# Patient Record
Sex: MALE | Race: WHITE | NOT HISPANIC OR LATINO | Employment: OTHER | URBAN - METROPOLITAN AREA
[De-identification: names, ages, dates, MRNs, and addresses within clinical notes are randomized per-mention and may not be internally consistent; named-entity substitution may affect disease eponyms.]

---

## 2023-01-30 PROBLEM — M54.42 CHRONIC BILATERAL LOW BACK PAIN WITH LEFT-SIDED SCIATICA: Status: ACTIVE | Noted: 2023-01-30

## 2023-01-30 PROBLEM — Z96.89 SPINAL CORD STIMULATOR STATUS: Status: ACTIVE | Noted: 2023-01-30

## 2023-01-30 PROBLEM — I25.10 CAD (CORONARY ARTERY DISEASE): Status: ACTIVE | Noted: 2023-01-30

## 2023-01-30 PROBLEM — N28.1 RENAL CYST: Status: ACTIVE | Noted: 2023-01-30

## 2023-01-30 PROBLEM — G89.29 CHRONIC BILATERAL LOW BACK PAIN WITH LEFT-SIDED SCIATICA: Status: ACTIVE | Noted: 2023-01-30

## 2023-01-30 PROBLEM — K21.9 GASTROESOPHAGEAL REFLUX DISEASE: Status: ACTIVE | Noted: 2023-01-30

## 2023-01-30 PROBLEM — G47.00 INSOMNIA: Status: ACTIVE | Noted: 2023-01-30

## 2023-01-30 PROBLEM — G89.0 CENTRAL PAIN SYNDROME: Status: ACTIVE | Noted: 2023-01-30

## 2023-01-30 PROBLEM — T78.00XA ANAPHYLAXIS DUE TO FOOD: Status: ACTIVE | Noted: 2023-01-30

## 2023-01-30 PROBLEM — I25.2 H/O ACUTE MYOCARDIAL INFARCTION: Status: ACTIVE | Noted: 2023-01-30

## 2023-01-30 PROBLEM — E11.9 DIABETES MELLITUS (MULTI): Status: ACTIVE | Noted: 2023-01-30

## 2023-01-30 PROBLEM — Z95.5 H/O HEART ARTERY STENT: Status: ACTIVE | Noted: 2023-01-30

## 2023-01-30 PROBLEM — H93.13 TINNITUS OF BOTH EARS: Status: ACTIVE | Noted: 2023-01-30

## 2023-01-30 PROBLEM — H61.20 CERUMEN IMPACTION: Status: ACTIVE | Noted: 2023-01-30

## 2023-01-30 PROBLEM — N32.81 OAB (OVERACTIVE BLADDER): Status: ACTIVE | Noted: 2023-01-30

## 2023-01-30 PROBLEM — I20.1: Status: ACTIVE | Noted: 2023-01-30

## 2023-01-30 PROBLEM — E11.65 TYPE 2 DIABETES MELLITUS WITH HYPERGLYCEMIA, WITHOUT LONG-TERM CURRENT USE OF INSULIN (MULTI): Status: ACTIVE | Noted: 2023-01-30

## 2023-01-30 PROBLEM — R35.0 FREQUENCY OF URINATION: Status: ACTIVE | Noted: 2023-01-30

## 2023-01-30 PROBLEM — G25.81 RLS (RESTLESS LEGS SYNDROME): Status: ACTIVE | Noted: 2023-01-30

## 2023-01-30 PROBLEM — Z95.5 CORONARY STENT PATENT: Status: ACTIVE | Noted: 2023-01-30

## 2023-01-30 PROBLEM — H90.3 BILATERAL SENSORINEURAL HEARING LOSS: Status: ACTIVE | Noted: 2023-01-30

## 2023-01-30 RX ORDER — MIRABEGRON 25 MG/1
25 TABLET, FILM COATED, EXTENDED RELEASE ORAL DAILY
COMMUNITY
End: 2023-09-12 | Stop reason: ALTCHOICE

## 2023-01-30 RX ORDER — PREGABALIN 50 MG/1
1 CAPSULE ORAL 3 TIMES DAILY
COMMUNITY
Start: 2017-02-02 | End: 2023-12-22 | Stop reason: SDUPTHER

## 2023-01-30 RX ORDER — TAMSULOSIN HYDROCHLORIDE 0.4 MG/1
1 CAPSULE ORAL DAILY
COMMUNITY
Start: 2014-12-19 | End: 2023-12-26 | Stop reason: SDUPTHER

## 2023-01-30 RX ORDER — TIZANIDINE 4 MG/1
4 TABLET ORAL 3 TIMES DAILY PRN
COMMUNITY
End: 2024-01-04 | Stop reason: SDUPTHER

## 2023-01-30 RX ORDER — DILTIAZEM HYDROCHLORIDE 60 MG/1
TABLET, FILM COATED ORAL
COMMUNITY
Start: 2016-11-14 | End: 2023-10-26 | Stop reason: SDUPTHER

## 2023-01-30 RX ORDER — EPINEPHRINE 0.3 MG/.3ML
0.3 INJECTION INTRAMUSCULAR
COMMUNITY

## 2023-01-30 RX ORDER — METFORMIN HYDROCHLORIDE 500 MG/1
1 TABLET ORAL
COMMUNITY
Start: 2014-03-04 | End: 2023-09-12 | Stop reason: ALTCHOICE

## 2023-01-30 RX ORDER — ACETAMINOPHEN 500 MG
1 TABLET ORAL DAILY
COMMUNITY

## 2023-01-30 RX ORDER — PROMETHAZINE HYDROCHLORIDE 25 MG/1
25 TABLET ORAL EVERY 6 HOURS PRN
COMMUNITY
End: 2023-03-14 | Stop reason: SDUPTHER

## 2023-01-30 RX ORDER — CLOPIDOGREL BISULFATE 75 MG/1
1 TABLET ORAL DAILY
COMMUNITY
Start: 2014-03-03 | End: 2024-01-05 | Stop reason: SDUPTHER

## 2023-01-30 RX ORDER — NITROGLYCERIN 0.4 MG/1
TABLET SUBLINGUAL
COMMUNITY

## 2023-01-30 RX ORDER — LOSARTAN POTASSIUM 50 MG/1
1 TABLET ORAL DAILY
COMMUNITY
End: 2023-11-09

## 2023-01-30 RX ORDER — ATORVASTATIN CALCIUM 80 MG/1
80 TABLET, FILM COATED ORAL DAILY
COMMUNITY
End: 2023-11-09

## 2023-01-30 RX ORDER — PANTOPRAZOLE SODIUM 40 MG/1
1 TABLET, DELAYED RELEASE ORAL DAILY
COMMUNITY
Start: 2014-01-07 | End: 2023-12-22 | Stop reason: SDUPTHER

## 2023-01-30 RX ORDER — TRAZODONE HYDROCHLORIDE 100 MG/1
TABLET ORAL NIGHTLY
COMMUNITY
End: 2023-03-22 | Stop reason: SDUPTHER

## 2023-01-30 RX ORDER — ROPINIROLE 1 MG/1
1 TABLET, FILM COATED ORAL NIGHTLY
COMMUNITY
End: 2024-04-05 | Stop reason: SDUPTHER

## 2023-01-30 RX ORDER — DAPAGLIFLOZIN AND METFORMIN HYDROCHLORIDE 5; 1000 MG/1; MG/1
1 TABLET, FILM COATED, EXTENDED RELEASE ORAL DAILY
COMMUNITY
End: 2023-11-03 | Stop reason: SDUPTHER

## 2023-01-30 RX ORDER — ISOSORBIDE MONONITRATE 30 MG/1
1 TABLET, EXTENDED RELEASE ORAL DAILY
COMMUNITY
Start: 2013-12-27

## 2023-01-30 RX ORDER — ASPIRIN 325 MG
1 TABLET ORAL DAILY
COMMUNITY
Start: 2014-12-19

## 2023-01-30 RX ORDER — DULOXETIN HYDROCHLORIDE 60 MG/1
1 CAPSULE, DELAYED RELEASE ORAL DAILY
COMMUNITY
End: 2024-01-05 | Stop reason: SDUPTHER

## 2023-01-30 RX ORDER — DILTIAZEM HYDROCHLORIDE EXTENDED-RELEASE TABLETS 360 MG/1
1 TABLET, EXTENDED RELEASE ORAL DAILY
COMMUNITY
Start: 2014-12-19 | End: 2023-12-22 | Stop reason: SDUPTHER

## 2023-03-07 LAB
ALBUMIN (G/DL) IN SER/PLAS: 4.6 G/DL (ref 3.4–5)
ANION GAP IN SER/PLAS: 14 MMOL/L (ref 10–20)
CALCIUM (MG/DL) IN SER/PLAS: 9.7 MG/DL (ref 8.6–10.6)
CARBON DIOXIDE, TOTAL (MMOL/L) IN SER/PLAS: 29 MMOL/L (ref 21–32)
CHLORIDE (MMOL/L) IN SER/PLAS: 105 MMOL/L (ref 98–107)
CREATININE (MG/DL) IN SER/PLAS: 1.15 MG/DL (ref 0.5–1.3)
ERYTHROCYTE DISTRIBUTION WIDTH (RATIO) BY AUTOMATED COUNT: 12.2 % (ref 11.5–14.5)
ERYTHROCYTE MEAN CORPUSCULAR HEMOGLOBIN CONCENTRATION (G/DL) BY AUTOMATED: 33.1 G/DL (ref 32–36)
ERYTHROCYTE MEAN CORPUSCULAR VOLUME (FL) BY AUTOMATED COUNT: 91 FL (ref 80–100)
ERYTHROCYTES (10*6/UL) IN BLOOD BY AUTOMATED COUNT: 5.16 X10E12/L (ref 4.5–5.9)
GFR MALE: 75 ML/MIN/1.73M2
GLUCOSE (MG/DL) IN SER/PLAS: 114 MG/DL (ref 74–99)
HEMATOCRIT (%) IN BLOOD BY AUTOMATED COUNT: 47.1 % (ref 41–52)
HEMOGLOBIN (G/DL) IN BLOOD: 15.6 G/DL (ref 13.5–17.5)
INR IN PPP BY COAGULATION ASSAY: 1 (ref 0.9–1.1)
LEUKOCYTES (10*3/UL) IN BLOOD BY AUTOMATED COUNT: 8.9 X10E9/L (ref 4.4–11.3)
NRBC (PER 100 WBCS) BY AUTOMATED COUNT: 0 /100 WBC (ref 0–0)
PHOSPHATE (MG/DL) IN SER/PLAS: 3.4 MG/DL (ref 2.5–4.9)
PLATELETS (10*3/UL) IN BLOOD AUTOMATED COUNT: 223 X10E9/L (ref 150–450)
POTASSIUM (MMOL/L) IN SER/PLAS: 3.9 MMOL/L (ref 3.5–5.3)
PROTHROMBIN TIME (PT) IN PPP BY COAGULATION ASSAY: 11.1 SEC (ref 9.8–13.4)
SODIUM (MMOL/L) IN SER/PLAS: 144 MMOL/L (ref 136–145)
UREA NITROGEN (MG/DL) IN SER/PLAS: 15 MG/DL (ref 6–23)

## 2023-03-14 ENCOUNTER — OFFICE VISIT (OUTPATIENT)
Dept: PRIMARY CARE | Facility: CLINIC | Age: 58
End: 2023-03-14
Payer: COMMERCIAL

## 2023-03-14 VITALS
HEIGHT: 68 IN | WEIGHT: 267 LBS | BODY MASS INDEX: 40.47 KG/M2 | HEART RATE: 109 BPM | DIASTOLIC BLOOD PRESSURE: 86 MMHG | SYSTOLIC BLOOD PRESSURE: 140 MMHG

## 2023-03-14 DIAGNOSIS — Z00.00 MEDICARE ANNUAL WELLNESS VISIT, SUBSEQUENT: ICD-10-CM

## 2023-03-14 DIAGNOSIS — I25.118 CORONARY ARTERY DISEASE INVOLVING NATIVE HEART WITH OTHER FORM OF ANGINA PECTORIS, UNSPECIFIED VESSEL OR LESION TYPE (CMS-HCC): ICD-10-CM

## 2023-03-14 DIAGNOSIS — R11.0 NAUSEA: ICD-10-CM

## 2023-03-14 DIAGNOSIS — E11.65 TYPE 2 DIABETES MELLITUS WITH HYPERGLYCEMIA, WITHOUT LONG-TERM CURRENT USE OF INSULIN (MULTI): Primary | ICD-10-CM

## 2023-03-14 PROCEDURE — 3079F DIAST BP 80-89 MM HG: CPT | Performed by: INTERNAL MEDICINE

## 2023-03-14 PROCEDURE — 4010F ACE/ARB THERAPY RXD/TAKEN: CPT | Performed by: INTERNAL MEDICINE

## 2023-03-14 PROCEDURE — 3077F SYST BP >= 140 MM HG: CPT | Performed by: INTERNAL MEDICINE

## 2023-03-14 PROCEDURE — 1036F TOBACCO NON-USER: CPT | Performed by: INTERNAL MEDICINE

## 2023-03-14 PROCEDURE — G0439 PPPS, SUBSEQ VISIT: HCPCS | Performed by: INTERNAL MEDICINE

## 2023-03-14 PROCEDURE — 99213 OFFICE O/P EST LOW 20 MIN: CPT | Performed by: INTERNAL MEDICINE

## 2023-03-14 RX ORDER — PROMETHAZINE HYDROCHLORIDE 25 MG/1
25 TABLET ORAL EVERY 6 HOURS PRN
Qty: 30 TABLET | Refills: 1 | Status: SHIPPED | OUTPATIENT
Start: 2023-03-14 | End: 2023-10-16 | Stop reason: SDUPTHER

## 2023-03-14 ASSESSMENT — ACTIVITIES OF DAILY LIVING (ADL)
DOING_HOUSEWORK: INDEPENDENT
TAKING_MEDICATION: INDEPENDENT
GROCERY_SHOPPING: INDEPENDENT
MANAGING_FINANCES: INDEPENDENT

## 2023-03-14 ASSESSMENT — ENCOUNTER SYMPTOMS: LOSS OF SENSATION IN FEET: 1

## 2023-03-14 NOTE — PROGRESS NOTES
"Subjective   Reason for Visit: Logan Campos is an 57 y.o. male here for a Medicare Wellness visit.     Past Medical, Surgical, and Family History reviewed and updated in chart.    Reviewed all medications by prescribing practitioner or clinical pharmacist (such as prescriptions, OTCs, herbal therapies and supplements) and documented in the medical record.    HPI  He does 100 push ups a day  He does boxing  Has cut down on sugars  He was 300 pounds in 2020  Patient Self Assessment of Health Status  Patient Self Assessment: Fair    He is going to have a heart cath tomorrow at [facility].   He has been feeling tired and stressed.  He had an abnormal stess test and an abnormal cardiac ct angio  He has noted his stimulator for his vasospastic angina has been going off more often    He had a blood test last week  Nutrition and Exercise  Current Diet: Well Balanced Diet  Adequate Fluid Intake: Yes  Caffeine: Yes  Exercise Frequency: RegularlySugar was 114    Functional Ability/Level of Safety  Cognitive Impairment Observed: No cognitive impairment observed  Cognitive Impairment Reported: No cognitive impairment reported by patient or family    Home Safety Risk Factors: None    Patient Care Team:  Magda Gilman DO as PCP - General     Review of Systems    Objective   Vitals:  /86   Pulse 109   Ht 1.727 m (5' 8\")   Wt 121 kg (267 lb)   BMI 40.60 kg/m²       Physical Exam  Cardiovascular:      Rate and Rhythm: Normal rate and regular rhythm.      Heart sounds: Normal heart sounds.   Pulmonary:      Effort: Pulmonary effort is normal.      Breath sounds: Normal breath sounds.   Abdominal:      Palpations: Abdomen is soft.   Neurological:      General: No focal deficit present.      Mental Status: He is alert.         Assessment/Plan   Problem List Items Addressed This Visit          Circulatory    CAD (coronary artery disease)    Relevant Orders    Follow Up In [facility] - PCP in 6 mo       " Endocrine/Metabolic    Type 2 diabetes mellitus with hyperglycemia, without long-term current use of insulin (CMS/[facility]) - Primary    Relevant Orders    Hemoglobin A1C    Albumin , Urine Random    Creatinine, Urine Random    Comprehensive Metabolic Panel    Lipid Panel    Follow Up In [facility] - PCP  Was wondering abut ozempic for wt loss due to his pain. We did discuss it. He says he tends to have lower sugars. He is on xigduo. We can revisit this in the futuer     Other Visit Diagnoses       Nausea        Relevant Medications     promethazine (Phenergan) 25 mg tablet he gets occasional nausea due to his night meds. Will use phenergan few x per mo and he is out. Refill sent.

## 2023-03-22 DIAGNOSIS — G47.00 INSOMNIA, UNSPECIFIED TYPE: ICD-10-CM

## 2023-03-22 RX ORDER — TRAZODONE HYDROCHLORIDE 100 MG/1
100 TABLET ORAL NIGHTLY
Qty: 90 TABLET | Refills: 3 | Status: SHIPPED | OUTPATIENT
Start: 2023-03-22 | End: 2024-04-05 | Stop reason: SDUPTHER

## 2023-06-06 ENCOUNTER — TELEPHONE (OUTPATIENT)
Dept: PRIMARY CARE | Facility: CLINIC | Age: 58
End: 2023-06-06
Payer: COMMERCIAL

## 2023-06-06 NOTE — TELEPHONE ENCOUNTER
"I called the pt he has had at least 1 \"pure liquid Diarrhea stool daily for at least the last 17 days.  He has had some loose stools in between. He is also having right stabbing flank  pain that started a week ago that comes and goes.  No vomiting however he has had some nausea that also comes and goes.  He had 1 episode of blood in his stool but thinks that is related to his hemorrhoid . He has not been in antibiotics recently and has had no changes in his diet. He does not have a GI specialist.  Please advise.   "

## 2023-06-06 NOTE — TELEPHONE ENCOUNTER
Patient calling- has had diarrhea for more than 17 days. Some nausea,no vomiting, no fever. Has had some right side pain with it.  Has tried pepto, immodium,brat diet . Nothing is helping. Feels he needs an appt. How to handle.  [phone]

## 2023-08-26 PROBLEM — Z91.018 FOOD ALLERGY: Status: ACTIVE | Noted: 2023-08-26

## 2023-08-26 PROBLEM — R94.39 ABNORMAL STRESS TEST: Status: ACTIVE | Noted: 2023-08-26

## 2023-08-26 PROBLEM — I20.9 ANGINA PECTORIS (CMS-HCC): Status: ACTIVE | Noted: 2023-08-26

## 2023-08-26 RX ORDER — CALCIUM CARBONATE/VITAMIN D3 250-3.125
1 TABLET ORAL
COMMUNITY
End: 2023-12-22 | Stop reason: ALTCHOICE

## 2023-08-26 RX ORDER — LIDOCAINE HCL 100 %
POWDER (GRAM) MISCELLANEOUS
COMMUNITY
Start: 2023-04-06

## 2023-08-26 RX ORDER — LISINOPRIL 5 MG/1
1 TABLET ORAL DAILY
COMMUNITY
Start: 2022-11-27 | End: 2023-09-12 | Stop reason: ALTCHOICE

## 2023-08-26 RX ORDER — DILTIAZEM HYDROCHLORIDE 360 MG/1
1 CAPSULE, EXTENDED RELEASE ORAL DAILY
COMMUNITY
Start: 2023-03-15 | End: 2024-05-23 | Stop reason: SDUPTHER

## 2023-08-26 RX ORDER — MULTIVITAMIN/IRON/FOLIC ACID 18MG-0.4MG
1 TABLET ORAL DAILY
COMMUNITY
End: 2023-12-22 | Stop reason: SDUPTHER

## 2023-08-26 RX ORDER — MIRABEGRON 50 MG/1
1 TABLET, FILM COATED, EXTENDED RELEASE ORAL DAILY
COMMUNITY
Start: 2023-01-13 | End: 2023-12-26 | Stop reason: SDUPTHER

## 2023-08-26 RX ORDER — KETAMINE HCL 100 %
POWDER (GRAM) MISCELLANEOUS
COMMUNITY
Start: 2023-02-28 | End: 2024-04-03 | Stop reason: SDUPTHER

## 2023-09-12 ENCOUNTER — OFFICE VISIT (OUTPATIENT)
Dept: PRIMARY CARE | Facility: CLINIC | Age: 58
End: 2023-09-12
Payer: MEDICARE

## 2023-09-12 VITALS
RESPIRATION RATE: 16 BRPM | BODY MASS INDEX: 38.19 KG/M2 | WEIGHT: 252 LBS | HEIGHT: 68 IN | DIASTOLIC BLOOD PRESSURE: 74 MMHG | SYSTOLIC BLOOD PRESSURE: 106 MMHG | HEART RATE: 92 BPM

## 2023-09-12 DIAGNOSIS — E11.65 TYPE 2 DIABETES MELLITUS WITH HYPERGLYCEMIA, WITHOUT LONG-TERM CURRENT USE OF INSULIN (MULTI): ICD-10-CM

## 2023-09-12 DIAGNOSIS — F43.21 ADJUSTMENT DISORDER WITH DEPRESSED MOOD: Primary | ICD-10-CM

## 2023-09-12 DIAGNOSIS — Z23 FLU VACCINE NEED: ICD-10-CM

## 2023-09-12 DIAGNOSIS — I25.118 CORONARY ARTERY DISEASE INVOLVING NATIVE HEART WITH OTHER FORM OF ANGINA PECTORIS, UNSPECIFIED VESSEL OR LESION TYPE (CMS-HCC): ICD-10-CM

## 2023-09-12 PROCEDURE — 1036F TOBACCO NON-USER: CPT | Performed by: INTERNAL MEDICINE

## 2023-09-12 PROCEDURE — 90686 IIV4 VACC NO PRSV 0.5 ML IM: CPT | Performed by: INTERNAL MEDICINE

## 2023-09-12 PROCEDURE — G0008 ADMIN INFLUENZA VIRUS VAC: HCPCS | Performed by: INTERNAL MEDICINE

## 2023-09-12 PROCEDURE — 3074F SYST BP LT 130 MM HG: CPT | Performed by: INTERNAL MEDICINE

## 2023-09-12 PROCEDURE — 4010F ACE/ARB THERAPY RXD/TAKEN: CPT | Performed by: INTERNAL MEDICINE

## 2023-09-12 PROCEDURE — 3078F DIAST BP <80 MM HG: CPT | Performed by: INTERNAL MEDICINE

## 2023-09-12 PROCEDURE — 99214 OFFICE O/P EST MOD 30 MIN: CPT | Performed by: INTERNAL MEDICINE

## 2023-09-12 RX ORDER — SERTRALINE HYDROCHLORIDE 50 MG/1
100 TABLET, FILM COATED ORAL DAILY
COMMUNITY

## 2023-09-12 ASSESSMENT — PATIENT HEALTH QUESTIONNAIRE - PHQ9
SUM OF ALL RESPONSES TO PHQ9 QUESTIONS 1 AND 2: 6
2. FEELING DOWN, DEPRESSED OR HOPELESS: NEARLY EVERY DAY
1. LITTLE INTEREST OR PLEASURE IN DOING THINGS: NEARLY EVERY DAY

## 2023-09-12 ASSESSMENT — PAIN SCALES - GENERAL: PAINLEVEL: 4

## 2023-09-12 NOTE — PROGRESS NOTES
"Subjective   Logan Campos is a 57 y.o. male who presents for Follow-up.    He had a heart cath on 3/15 that showed non obstructive cad    He says his diarrhea that he called about in June, that had been going on for a few weeks had gone away after stopping a sparkling water he had been drinking  He did not want to go to ER , he says \"they always admit me due to my history\"    He has been having right flank pain intermittently  It is not too severe  He called about it in the summer   It has not gotten worse    Just started sertraline by psych, Dr Yu  He is going through a separation and he's not been doing as well   He is also on duloxetine 60    He rarely uses tizanidine; he uses it for back spasms    He is taking pregabalin    He says he got a pneumnia vaccine from Dr Faust's office  Review of Systems    Objective   /74 (BP Location: Left arm, Patient Position: Sitting, BP Cuff Size: Adult)   Pulse 92   Resp 16   Ht 1.727 m (5' 8\")   Wt 114 kg (252 lb)   BMI 38.32 kg/m²    Physical Exam  Visit Vitals  /74 (BP Location: Left arm, Patient Position: Sitting, BP Cuff Size: Adult)   Pulse 92   Resp 16   Ht 1.727 m (5' 8\")   Wt 114 kg (252 lb)   BMI 38.32 kg/m²   Smoking Status Never   BSA 2.34 m²      GEN: NAD  HEENT: normal  NECK: no adenopathy, no thyroid enlargment  LUNGS: CTAB  CV: reg S1/S2 no murmurs  EXT: no leg edema   Assessment/Plan   Problem List Items Addressed This Visit       CAD (coronary artery disease)    Relevant Orders    Comprehensive Metabolic Panel    Lipid Panel    Type 2 diabetes mellitus with hyperglycemia, without long-term current use of insulin (CMS/[facility])    Relevant Orders    Comprehensive Metabolic Panel    Hemoglobin A1C    Albumin , Urine Random    Creatinine, Urine Random    See me again in 3 months to follow up on diabetes.      Other Visit Diagnoses       Flu vaccine need        Relevant Orders    Flu vaccine (IIV4) age 6 months and greater, preservative free " (Completed)

## 2023-09-12 NOTE — PATIENT INSTRUCTIONS
Get blood test and urine test done, after fasting overnight.    You can do this at any time.    See me in 3 months to follow up on diabetes

## 2023-09-25 LAB — 11-NOR-9-CARBOXY-THC, URN, QUANT: 338 NG/ML

## 2023-09-26 PROBLEM — E78.2 MIXED HYPERLIPIDEMIA: Status: ACTIVE | Noted: 2023-09-26

## 2023-09-26 PROBLEM — F43.23 ADJUSTMENT DISORDER WITH MIXED ANXIETY AND DEPRESSED MOOD: Status: ACTIVE | Noted: 2023-09-26

## 2023-09-27 LAB
6-ACETYLMORPHINE: <25 NG/ML
7-AMINOCLONAZEPAM: <25 NG/ML
ALPHA-HYDROXYALPRAZOLAM: <25 NG/ML
ALPHA-HYDROXYMIDAZOLAM: <25 NG/ML
ALPRAZOLAM: <25 NG/ML
AMPHETAMINE (PRESENCE) IN URINE BY SCREEN METHOD: ABNORMAL
BARBITURATES PRESENCE IN URINE BY SCREEN METHOD: ABNORMAL
CANNABINOIDS IN URINE BY SCREEN METHOD: ABNORMAL
CHLORDIAZEPOXIDE: <25 NG/ML
CLONAZEPAM: <25 NG/ML
COCAINE (PRESENCE) IN URINE BY SCREEN METHOD: ABNORMAL
CODEINE: <50 NG/ML
CREATINE, URINE FOR DRUG: 79.9 MG/DL
DIAZEPAM: <25 NG/ML
DRUG SCREEN COMMENT URINE: ABNORMAL
EDDP: <25 NG/ML
FENTANYL CONFIRMATION, URINE: <2.5 NG/ML
HYDROCODONE: <25 NG/ML
HYDROMORPHONE: <25 NG/ML
LORAZEPAM: <25 NG/ML
METHADONE CONFIRMATION,URINE: <25 NG/ML
MIDAZOLAM: <25 NG/ML
MORPHINE URINE: <50 NG/ML
NORDIAZEPAM: <25 NG/ML
NORFENTANYL: <2.5 NG/ML
NORHYDROCODONE: <25 NG/ML
NOROXYCODONE: <25 NG/ML
O-DESMETHYLTRAMADOL: <50 NG/ML
OXAZEPAM: <25 NG/ML
OXYCODONE: <25 NG/ML
OXYMORPHONE: <25 NG/ML
PHENCYCLIDINE (PRESENCE) IN URINE BY SCREEN METHOD: ABNORMAL
TEMAZEPAM: <25 NG/ML
TRAMADOL: <50 NG/ML
ZOLPIDEM METABOLITE (ZCA): <25 NG/ML
ZOLPIDEM: <25 NG/ML

## 2023-10-02 ENCOUNTER — TELEPHONE (OUTPATIENT)
Dept: PRIMARY CARE | Facility: CLINIC | Age: 58
End: 2023-10-02
Payer: COMMERCIAL

## 2023-10-03 DIAGNOSIS — R26.2 DIFFICULTY WALKING: Primary | ICD-10-CM

## 2023-10-06 ENCOUNTER — TELEPHONE (OUTPATIENT)
Dept: PRIMARY CARE | Facility: CLINIC | Age: 58
End: 2023-10-06
Payer: COMMERCIAL

## 2023-10-06 NOTE — TELEPHONE ENCOUNTER
Call from voicemail    Patient you discuss with him about a medication to lower his A1C.  He states his co pay is affordable ($50 per month) and he would like the prescription for :    Ozempic    BN

## 2023-10-09 DIAGNOSIS — G89.29 OTHER CHRONIC PAIN: ICD-10-CM

## 2023-10-09 DIAGNOSIS — M54.42 LUMBAGO WITH SCIATICA, LEFT SIDE: ICD-10-CM

## 2023-10-12 ENCOUNTER — LAB (OUTPATIENT)
Dept: LAB | Facility: LAB | Age: 58
End: 2023-10-12
Payer: MEDICARE

## 2023-10-12 DIAGNOSIS — Z95.5 PRESENCE OF CORONARY ANGIOPLASTY IMPLANT AND GRAFT: Primary | ICD-10-CM

## 2023-10-12 DIAGNOSIS — I20.9 ANGINA PECTORIS, UNSPECIFIED (CMS-HCC): ICD-10-CM

## 2023-10-12 DIAGNOSIS — I20.1 ANGINA PECTORIS WITH DOCUMENTED SPASM (CMS-HCC): ICD-10-CM

## 2023-10-12 DIAGNOSIS — I25.10 ATHEROSCLEROTIC HEART DISEASE OF NATIVE CORONARY ARTERY WITHOUT ANGINA PECTORIS: ICD-10-CM

## 2023-10-12 DIAGNOSIS — E11.9 TYPE 2 DIABETES MELLITUS WITHOUT COMPLICATIONS (MULTI): ICD-10-CM

## 2023-10-12 DIAGNOSIS — I25.2 OLD MYOCARDIAL INFARCTION: ICD-10-CM

## 2023-10-12 DIAGNOSIS — E11.65 TYPE 2 DIABETES MELLITUS WITH HYPERGLYCEMIA (MULTI): ICD-10-CM

## 2023-10-12 DIAGNOSIS — E11.65 TYPE 2 DIABETES MELLITUS WITH HYPERGLYCEMIA, WITHOUT LONG-TERM CURRENT USE OF INSULIN (MULTI): ICD-10-CM

## 2023-10-12 LAB
ALBUMIN SERPL BCP-MCNC: 4.6 G/DL (ref 3.4–5)
ALP SERPL-CCNC: 108 U/L (ref 33–120)
ALT SERPL W P-5'-P-CCNC: 25 U/L (ref 10–52)
ANION GAP SERPL CALC-SCNC: 17 MMOL/L (ref 10–20)
AST SERPL W P-5'-P-CCNC: 20 U/L (ref 9–39)
BILIRUB SERPL-MCNC: 0.5 MG/DL (ref 0–1.2)
BUN SERPL-MCNC: 20 MG/DL (ref 6–23)
CALCIUM SERPL-MCNC: 10 MG/DL (ref 8.6–10.6)
CHLORIDE SERPL-SCNC: 105 MMOL/L (ref 98–107)
CHOLEST SERPL-MCNC: 133 MG/DL (ref 0–199)
CHOLESTEROL/HDL RATIO: 3.3
CO2 SERPL-SCNC: 27 MMOL/L (ref 21–32)
CREAT SERPL-MCNC: 1.4 MG/DL (ref 0.5–1.3)
CREAT UR-MCNC: 152.9 MG/DL (ref 20–370)
EST. AVERAGE GLUCOSE BLD GHB EST-MCNC: 163 MG/DL
GFR SERPL CREATININE-BSD FRML MDRD: 59 ML/MIN/1.73M*2
GLUCOSE SERPL-MCNC: 157 MG/DL (ref 74–99)
HBA1C MFR BLD: 7.3 %
HDLC SERPL-MCNC: 40.2 MG/DL
LDLC SERPL CALC-MCNC: 58 MG/DL
MAGNESIUM SERPL-MCNC: 2.52 MG/DL (ref 1.6–2.4)
MICROALBUMIN UR-MCNC: 8.2 MG/L
MICROALBUMIN/CREAT UR: 5.4 UG/MG CREAT
NON HDL CHOLESTEROL: 93 MG/DL (ref 0–149)
POTASSIUM SERPL-SCNC: 4.8 MMOL/L (ref 3.5–5.3)
PROT SERPL-MCNC: 7.5 G/DL (ref 6.4–8.2)
SODIUM SERPL-SCNC: 144 MMOL/L (ref 136–145)
TRIGL SERPL-MCNC: 176 MG/DL (ref 0–149)
VLDL: 35 MG/DL (ref 0–40)

## 2023-10-12 PROCEDURE — 83735 ASSAY OF MAGNESIUM: CPT

## 2023-10-12 PROCEDURE — 36415 COLL VENOUS BLD VENIPUNCTURE: CPT

## 2023-10-12 PROCEDURE — 83036 HEMOGLOBIN GLYCOSYLATED A1C: CPT

## 2023-10-12 PROCEDURE — 80053 COMPREHEN METABOLIC PANEL: CPT

## 2023-10-12 PROCEDURE — 80061 LIPID PANEL: CPT

## 2023-10-12 PROCEDURE — 82570 ASSAY OF URINE CREATININE: CPT

## 2023-10-12 PROCEDURE — 82043 UR ALBUMIN QUANTITATIVE: CPT

## 2023-10-16 ENCOUNTER — APPOINTMENT (OUTPATIENT)
Dept: CARDIOLOGY | Facility: CLINIC | Age: 58
End: 2023-10-16
Payer: COMMERCIAL

## 2023-10-16 DIAGNOSIS — R11.0 NAUSEA: ICD-10-CM

## 2023-10-16 DIAGNOSIS — E11.65 TYPE 2 DIABETES MELLITUS WITH HYPERGLYCEMIA, WITHOUT LONG-TERM CURRENT USE OF INSULIN (MULTI): Primary | ICD-10-CM

## 2023-10-16 RX ORDER — PROMETHAZINE HYDROCHLORIDE 25 MG/1
25 TABLET ORAL EVERY 6 HOURS PRN
Qty: 30 TABLET | Refills: 1 | Status: SHIPPED | OUTPATIENT
Start: 2023-10-16 | End: 2024-04-05 | Stop reason: SDUPTHER

## 2023-10-17 NOTE — TELEPHONE ENCOUNTER
Oswaldo for pt informing him the Phenergan 25 mg was called in to his pharmacy, and Ozempic will be discussed at his next visit.

## 2023-10-23 ENCOUNTER — TELEPHONE (OUTPATIENT)
Dept: PRIMARY CARE | Facility: CLINIC | Age: 58
End: 2023-10-23
Payer: COMMERCIAL

## 2023-10-23 DIAGNOSIS — R93.5 ABNORMAL COMPUTED TOMOGRAPHY OF ABDOMEN AND PELVIS: Primary | ICD-10-CM

## 2023-10-23 NOTE — TELEPHONE ENCOUNTER
Pt calling stating he was seen at [facility] on 10/13 for Diverticulitis. There was an incidental finding on his CT scan that they want his to discuss with his PCP. Can you please review.

## 2023-10-24 RX ORDER — METOPROLOL TARTRATE 25 MG/1
25 TABLET, FILM COATED ORAL ONCE
Status: CANCELLED | OUTPATIENT
Start: 2023-10-26 | End: 2023-10-26

## 2023-10-24 RX ORDER — NITROGLYCERIN 0.4 MG/1
0.4 TABLET SUBLINGUAL ONCE
Status: CANCELLED | OUTPATIENT
Start: 2023-10-26 | End: 2023-10-26

## 2023-10-24 RX ORDER — ONDANSETRON HYDROCHLORIDE 2 MG/ML
4 INJECTION, SOLUTION INTRAVENOUS ONCE
Status: CANCELLED | OUTPATIENT
Start: 2023-10-26 | End: 2023-10-26

## 2023-10-24 RX ORDER — FAMOTIDINE 10 MG/ML
20 INJECTION INTRAVENOUS ONCE AS NEEDED
Status: CANCELLED | OUTPATIENT
Start: 2023-10-26

## 2023-10-24 RX ORDER — EPINEPHRINE 0.3 MG/.3ML
0.3 INJECTION SUBCUTANEOUS EVERY 5 MIN PRN
Status: CANCELLED | OUTPATIENT
Start: 2023-10-26

## 2023-10-24 RX ORDER — ALBUTEROL SULFATE 0.83 MG/ML
3 SOLUTION RESPIRATORY (INHALATION) AS NEEDED
Status: CANCELLED | OUTPATIENT
Start: 2023-10-26

## 2023-10-24 RX ORDER — DIPHENHYDRAMINE HYDROCHLORIDE 50 MG/ML
50 INJECTION INTRAMUSCULAR; INTRAVENOUS AS NEEDED
Status: CANCELLED | OUTPATIENT
Start: 2023-10-26

## 2023-10-25 DIAGNOSIS — G89.0 CENTRAL PAIN SYNDROME: Primary | ICD-10-CM

## 2023-10-25 RX ORDER — PREGABALIN 50 MG/1
50 CAPSULE ORAL 3 TIMES DAILY
Qty: 270 CAPSULE | Refills: 0 | OUTPATIENT
Start: 2023-10-25

## 2023-10-26 ENCOUNTER — INFUSION (OUTPATIENT)
Dept: INFUSION THERAPY | Facility: CLINIC | Age: 58
End: 2023-10-26
Payer: COMMERCIAL

## 2023-10-26 VITALS
OXYGEN SATURATION: 94 % | DIASTOLIC BLOOD PRESSURE: 78 MMHG | SYSTOLIC BLOOD PRESSURE: 117 MMHG | HEART RATE: 82 BPM | TEMPERATURE: 97 F | RESPIRATION RATE: 12 BRPM

## 2023-10-26 DIAGNOSIS — M54.42 CHRONIC BILATERAL LOW BACK PAIN WITH LEFT-SIDED SCIATICA: ICD-10-CM

## 2023-10-26 DIAGNOSIS — G89.29 CHRONIC BILATERAL LOW BACK PAIN WITH LEFT-SIDED SCIATICA: ICD-10-CM

## 2023-10-26 DIAGNOSIS — Z96.89 SPINAL CORD STIMULATOR STATUS: ICD-10-CM

## 2023-10-26 DIAGNOSIS — G89.0 CENTRAL PAIN SYNDROME: ICD-10-CM

## 2023-10-26 PROCEDURE — 96368 THER/DIAG CONCURRENT INF: CPT | Mod: INF

## 2023-10-26 PROCEDURE — 2500000005 HC RX 250 GENERAL PHARMACY W/O HCPCS: Performed by: NURSE PRACTITIONER

## 2023-10-26 PROCEDURE — 2500000004 HC RX 250 GENERAL PHARMACY W/ HCPCS (ALT 636 FOR OP/ED): Performed by: NURSE PRACTITIONER

## 2023-10-26 PROCEDURE — 96365 THER/PROPH/DIAG IV INF INIT: CPT | Mod: INF

## 2023-10-26 RX ORDER — EPINEPHRINE 0.3 MG/.3ML
0.3 INJECTION SUBCUTANEOUS EVERY 5 MIN PRN
Status: CANCELLED | OUTPATIENT
Start: 2023-11-09

## 2023-10-26 RX ORDER — NITROGLYCERIN 0.4 MG/1
0.4 TABLET SUBLINGUAL ONCE
Status: CANCELLED | OUTPATIENT
Start: 2023-11-09 | End: 2023-11-09

## 2023-10-26 RX ORDER — METOPROLOL TARTRATE 25 MG/1
25 TABLET, FILM COATED ORAL ONCE
Status: CANCELLED | OUTPATIENT
Start: 2023-11-09 | End: 2023-11-09

## 2023-10-26 RX ORDER — DIPHENHYDRAMINE HYDROCHLORIDE 50 MG/ML
50 INJECTION INTRAMUSCULAR; INTRAVENOUS AS NEEDED
Status: CANCELLED | OUTPATIENT
Start: 2023-11-09

## 2023-10-26 RX ORDER — HYDROXYZINE PAMOATE 50 MG/1
50 CAPSULE ORAL ONCE
COMMUNITY

## 2023-10-26 RX ORDER — FAMOTIDINE 10 MG/ML
20 INJECTION INTRAVENOUS ONCE AS NEEDED
Status: CANCELLED | OUTPATIENT
Start: 2023-11-09

## 2023-10-26 RX ORDER — ONDANSETRON HYDROCHLORIDE 2 MG/ML
4 INJECTION, SOLUTION INTRAVENOUS ONCE
Status: CANCELLED | OUTPATIENT
Start: 2023-11-09 | End: 2023-11-09

## 2023-10-26 RX ORDER — ALBUTEROL SULFATE 0.83 MG/ML
3 SOLUTION RESPIRATORY (INHALATION) AS NEEDED
Status: CANCELLED | OUTPATIENT
Start: 2023-11-09

## 2023-10-26 RX ADMIN — Medication: at 09:04

## 2023-10-26 RX ADMIN — PROPOFOL 100 MG: 10 INJECTION, EMULSION INTRAVENOUS at 09:03

## 2023-10-26 ASSESSMENT — PAIN - FUNCTIONAL ASSESSMENT: PAIN_FUNCTIONAL_ASSESSMENT: 0-10

## 2023-10-26 ASSESSMENT — PAIN SCALES - GENERAL
PAINLEVEL_OUTOF10: 2
PAINLEVEL_OUTOF10: 8

## 2023-10-26 ASSESSMENT — PATIENT HEALTH QUESTIONNAIRE - PHQ9
2. FEELING DOWN, DEPRESSED OR HOPELESS: SEVERAL DAYS
1. LITTLE INTEREST OR PLEASURE IN DOING THINGS: SEVERAL DAYS
SUM OF ALL RESPONSES TO PHQ9 QUESTIONS 1 AND 2: 2

## 2023-10-26 ASSESSMENT — COLUMBIA-SUICIDE SEVERITY RATING SCALE - C-SSRS
6. HAVE YOU EVER DONE ANYTHING, STARTED TO DO ANYTHING, OR PREPARED TO DO ANYTHING TO END YOUR LIFE?: NO
2. HAVE YOU ACTUALLY HAD ANY THOUGHTS OF KILLING YOURSELF?: NO
1. IN THE PAST MONTH, HAVE YOU WISHED YOU WERE DEAD OR WISHED YOU COULD GO TO SLEEP AND NOT WAKE UP?: NO

## 2023-10-26 ASSESSMENT — ENCOUNTER SYMPTOMS
OCCASIONAL FEELINGS OF UNSTEADINESS: 0
LOSS OF SENSATION IN FEET: 0

## 2023-10-26 ASSESSMENT — PAIN DESCRIPTION - DESCRIPTORS: DESCRIPTORS: ACHING;NUMBNESS

## 2023-10-26 NOTE — PROGRESS NOTES
S: Patient here for 12th opioid sparing pain infusion. Patient reports 7% reduction in pain after last infusion that lasted 15. days    Purpose of pain infusion meds explained along with potential side effects.  Patient verbalized understanding.    B: Pain Issues    A: Patient currently has pain described on flow sheet documentation. Designated  is wife. Patient last ate solid food >5 hours ago, and had liquid >1 hours ago.    R: Plan; Obtain IV access, do patient risk assessment, and start opioid sparing infusion as ordered. Monitoring for S/S of adverse reactions.    0940: Pain infusion completed, patient tolerated well.

## 2023-10-31 ENCOUNTER — HOSPITAL ENCOUNTER (OUTPATIENT)
Dept: CARDIOLOGY | Facility: CLINIC | Age: 58
Discharge: HOME | End: 2023-10-31
Payer: MEDICARE

## 2023-10-31 ENCOUNTER — APPOINTMENT (OUTPATIENT)
Dept: INFUSION THERAPY | Facility: CLINIC | Age: 58
End: 2023-10-31
Payer: COMMERCIAL

## 2023-10-31 DIAGNOSIS — I25.10 CORONARY ARTERY DISEASE, UNSPECIFIED VESSEL OR LESION TYPE, UNSPECIFIED WHETHER ANGINA PRESENT, UNSPECIFIED WHETHER NATIVE OR TRANSPLANTED HEART: ICD-10-CM

## 2023-10-31 DIAGNOSIS — I25.10 CORONARY ARTERY DISEASE INVOLVING NATIVE CORONARY ARTERY OF NATIVE HEART WITHOUT ANGINA PECTORIS: Primary | ICD-10-CM

## 2023-10-31 LAB — EJECTION FRACTION APICAL 4 CHAMBER: 56.1

## 2023-10-31 PROCEDURE — 93306 TTE W/DOPPLER COMPLETE: CPT | Performed by: INTERNAL MEDICINE

## 2023-10-31 PROCEDURE — 2500000004 HC RX 250 GENERAL PHARMACY W/ HCPCS (ALT 636 FOR OP/ED): Performed by: STUDENT IN AN ORGANIZED HEALTH CARE EDUCATION/TRAINING PROGRAM

## 2023-10-31 PROCEDURE — 93306 TTE W/DOPPLER COMPLETE: CPT

## 2023-10-31 RX ORDER — PREGABALIN 50 MG/1
50 CAPSULE ORAL 3 TIMES DAILY
Status: CANCELLED | OUTPATIENT
Start: 2023-10-31

## 2023-10-31 RX ORDER — PREGABALIN 50 MG/1
50 CAPSULE ORAL 2 TIMES DAILY
Qty: 90 CAPSULE | Refills: 2 | Status: SHIPPED | OUTPATIENT
Start: 2023-10-31 | End: 2023-12-22 | Stop reason: SDUPTHER

## 2023-10-31 RX ADMIN — PERFLUTREN 10 ML OF DILUTION: 6.52 INJECTION, SUSPENSION INTRAVENOUS at 10:30

## 2023-11-01 ENCOUNTER — PATIENT MESSAGE (OUTPATIENT)
Dept: PAIN MEDICINE | Facility: CLINIC | Age: 58
End: 2023-11-01
Payer: COMMERCIAL

## 2023-11-01 DIAGNOSIS — G89.4 CHRONIC PAIN SYNDROME: Primary | ICD-10-CM

## 2023-11-02 RX ORDER — PREGABALIN 50 MG/1
50 CAPSULE ORAL 3 TIMES DAILY
Qty: 90 CAPSULE | Refills: 2 | Status: SHIPPED | OUTPATIENT
Start: 2023-11-02 | End: 2024-04-01 | Stop reason: SDUPTHER

## 2023-11-03 DIAGNOSIS — E11.65 TYPE 2 DIABETES MELLITUS WITH HYPERGLYCEMIA, WITHOUT LONG-TERM CURRENT USE OF INSULIN (MULTI): ICD-10-CM

## 2023-11-03 RX ORDER — DAPAGLIFLOZIN AND METFORMIN HYDROCHLORIDE 5; 1000 MG/1; MG/1
1 TABLET, FILM COATED, EXTENDED RELEASE ORAL
Qty: 90 TABLET | Refills: 3 | Status: SHIPPED | OUTPATIENT
Start: 2023-11-03 | End: 2023-11-09 | Stop reason: SDUPTHER

## 2023-11-07 DIAGNOSIS — E11.65 TYPE 2 DIABETES MELLITUS WITH HYPERGLYCEMIA, WITHOUT LONG-TERM CURRENT USE OF INSULIN (MULTI): ICD-10-CM

## 2023-11-07 DIAGNOSIS — I10 BENIGN ESSENTIAL HTN: Primary | ICD-10-CM

## 2023-11-07 DIAGNOSIS — E78.2 MIXED HYPERLIPIDEMIA: ICD-10-CM

## 2023-11-09 RX ORDER — LOSARTAN POTASSIUM 50 MG/1
50 TABLET ORAL DAILY
Qty: 90 TABLET | Refills: 3 | Status: SHIPPED | OUTPATIENT
Start: 2023-11-09

## 2023-11-09 RX ORDER — DAPAGLIFLOZIN AND METFORMIN HYDROCHLORIDE 5; 1000 MG/1; MG/1
1 TABLET, FILM COATED, EXTENDED RELEASE ORAL
Qty: 90 TABLET | Refills: 3 | Status: SHIPPED | OUTPATIENT
Start: 2023-11-09 | End: 2024-01-30 | Stop reason: SDUPTHER

## 2023-11-09 RX ORDER — ATORVASTATIN CALCIUM 80 MG/1
80 TABLET, FILM COATED ORAL DAILY
Qty: 90 TABLET | Refills: 3 | Status: SHIPPED | OUTPATIENT
Start: 2023-11-09

## 2023-11-16 PROBLEM — G90.50 COMPLEX REGIONAL PAIN SYNDROME TYPE I: Status: ACTIVE | Noted: 2023-11-16

## 2023-11-16 PROBLEM — J30.81 ALLERGIC RHINITIS DUE TO ANIMAL HAIR AND DANDER: Status: ACTIVE | Noted: 2019-01-24

## 2023-11-16 PROBLEM — Z95.5 PRESENCE OF STENT IN CORONARY ARTERY IN PATIENT WITH CORONARY ARTERY DISEASE: Status: ACTIVE | Noted: 2019-01-24

## 2023-11-16 PROBLEM — M17.12 PRIMARY OSTEOARTHRITIS OF LEFT KNEE: Status: ACTIVE | Noted: 2020-01-16

## 2023-11-16 PROBLEM — J45.30 MILD PERSISTENT ASTHMA WITHOUT COMPLICATION (HHS-HCC): Status: ACTIVE | Noted: 2020-04-16

## 2023-11-16 PROBLEM — M47.817 LUMBOSACRAL SPONDYLOSIS WITHOUT MYELOPATHY: Status: ACTIVE | Noted: 2023-11-16

## 2023-11-16 PROBLEM — S83.242A TEAR OF MEDIAL MENISCUS OF LEFT KNEE, CURRENT: Status: ACTIVE | Noted: 2018-08-21

## 2023-11-16 PROBLEM — I10 ESSENTIAL HYPERTENSION: Status: ACTIVE | Noted: 2022-07-20

## 2023-11-16 PROBLEM — N40.1 BENIGN PROSTATIC HYPERPLASIA WITH URINARY FREQUENCY: Status: ACTIVE | Noted: 2019-01-24

## 2023-11-16 PROBLEM — J45.909 ASTHMA (HHS-HCC): Status: ACTIVE | Noted: 2023-11-16

## 2023-11-16 PROBLEM — M48.061 SPINAL STENOSIS OF LUMBAR REGION WITHOUT NEUROGENIC CLAUDICATION: Status: ACTIVE | Noted: 2019-01-24

## 2023-11-16 PROBLEM — G61.9 INFLAMMATORY NEUROPATHY (MULTI): Status: ACTIVE | Noted: 2020-04-16

## 2023-11-16 PROBLEM — I25.10 PRESENCE OF STENT IN CORONARY ARTERY IN PATIENT WITH CORONARY ARTERY DISEASE: Status: ACTIVE | Noted: 2019-01-24

## 2023-11-16 PROBLEM — R11.0 CHRONIC NAUSEA: Status: ACTIVE | Noted: 2018-10-04

## 2023-11-16 PROBLEM — M54.17 LUMBOSACRAL RADICULOPATHY: Status: ACTIVE | Noted: 2023-11-16

## 2023-11-16 PROBLEM — R35.0 BENIGN PROSTATIC HYPERPLASIA WITH URINARY FREQUENCY: Status: ACTIVE | Noted: 2019-01-24

## 2023-11-17 ENCOUNTER — OFFICE VISIT (OUTPATIENT)
Dept: CARDIOLOGY | Facility: CLINIC | Age: 58
End: 2023-11-17
Payer: MEDICARE

## 2023-11-17 VITALS
BODY MASS INDEX: 37.03 KG/M2 | HEIGHT: 69 IN | HEART RATE: 89 BPM | DIASTOLIC BLOOD PRESSURE: 72 MMHG | SYSTOLIC BLOOD PRESSURE: 109 MMHG | WEIGHT: 250 LBS | OXYGEN SATURATION: 98 %

## 2023-11-17 DIAGNOSIS — I25.2 H/O ACUTE MYOCARDIAL INFARCTION: ICD-10-CM

## 2023-11-17 DIAGNOSIS — I10 ESSENTIAL HYPERTENSION: ICD-10-CM

## 2023-11-17 DIAGNOSIS — E78.2 MIXED HYPERLIPIDEMIA: ICD-10-CM

## 2023-11-17 DIAGNOSIS — Z95.5 CORONARY STENT PATENT: ICD-10-CM

## 2023-11-17 DIAGNOSIS — I25.118 CORONARY ARTERY DISEASE OF NATIVE ARTERY OF NATIVE HEART WITH STABLE ANGINA PECTORIS (CMS-HCC): Primary | ICD-10-CM

## 2023-11-17 PROCEDURE — 3074F SYST BP LT 130 MM HG: CPT | Performed by: NURSE PRACTITIONER

## 2023-11-17 PROCEDURE — 3051F HG A1C>EQUAL 7.0%<8.0%: CPT | Performed by: NURSE PRACTITIONER

## 2023-11-17 PROCEDURE — 3048F LDL-C <100 MG/DL: CPT | Performed by: NURSE PRACTITIONER

## 2023-11-17 PROCEDURE — 99212 OFFICE O/P EST SF 10 MIN: CPT | Mod: PO | Performed by: NURSE PRACTITIONER

## 2023-11-17 PROCEDURE — 3078F DIAST BP <80 MM HG: CPT | Performed by: NURSE PRACTITIONER

## 2023-11-17 PROCEDURE — 3061F NEG MICROALBUMINURIA REV: CPT | Performed by: NURSE PRACTITIONER

## 2023-11-17 PROCEDURE — 99212 OFFICE O/P EST SF 10 MIN: CPT | Performed by: NURSE PRACTITIONER

## 2023-11-17 PROCEDURE — 4010F ACE/ARB THERAPY RXD/TAKEN: CPT | Performed by: NURSE PRACTITIONER

## 2023-11-17 PROCEDURE — 1036F TOBACCO NON-USER: CPT | Performed by: NURSE PRACTITIONER

## 2023-11-17 ASSESSMENT — ENCOUNTER SYMPTOMS
ACTIVITY CHANGE: 1
GASTROINTESTINAL NEGATIVE: 1
FATIGUE: 0
ARTHRALGIAS: 1
BACK PAIN: 1
PSYCHIATRIC NEGATIVE: 1
APNEA: 0
CHEST TIGHTNESS: 1
DIAPHORESIS: 0
FEVER: 1
SHORTNESS OF BREATH: 1
CHILLS: 0
ENDOCRINE COMMENTS: + DIABETES
ENDOCRINE NEGATIVE: 1
EYES NEGATIVE: 1
ALLERGIC/IMMUNOLOGIC NEGATIVE: 1
APPETITE CHANGE: 0
PALPITATIONS: 0

## 2023-11-17 NOTE — PROGRESS NOTES
Logan Campos is a 57 y.o. male     History Of Present Illness     Mr Campos is a 57 year old male with a PMH of a MI x2, Coronary artery stents X8, T2DM, COPD and chronic back pain here for a routine follow up. He previously underwent a left heart catheterization which revealed normal coronaries and patent stents. He continues to under go pain management for his chronic back pain. He continues to complain of angina. He does complain of shortness of breath with exertion. He denies any complaints of palpitations, lower extremity edema, dizziness or syncope. He recently started exercising with both weight lifting, aquatics/cardio and tolerating this very well.  He recently underwent labs and an echocardiogram and I have reviewed the results with him    Social HX  Social History     Tobacco Use    Smoking status: Never     Passive exposure: Never    Smokeless tobacco: Never          Family HX  Family History   Problem Relation Name Age of Onset    Breast cancer Sister      Stomach cancer Maternal Grandmother            Review Of Systems   Review of Systems   Constitutional:  Positive for activity change and fever. Negative for appetite change, chills, diaphoresis and fatigue.   Eyes: Negative.    Respiratory:  Positive for chest tightness and shortness of breath. Negative for apnea.    Cardiovascular:  Positive for chest pain. Negative for palpitations and leg swelling.   Gastrointestinal: Negative.    Endocrine: Negative.         + Diabetes   Genitourinary: Negative.    Musculoskeletal:  Positive for arthralgias and back pain.   Skin: Negative.    Allergic/Immunologic: Negative.    Hematological:         Recent elevated WBC   Psychiatric/Behavioral: Negative.            Allergies  Allergies   Allergen Reactions    Onion Unknown    Cat Dander Unknown    Hydromorphone Rash and Unknown     Agent: Dilaudid          Vitals  109/72      Physical Exam  Physical Exam  Constitutional:       Appearance: Normal appearance.   HENT:       Head: Normocephalic and atraumatic.      Nose: Nose normal.      Mouth/Throat:      Mouth: Mucous membranes are dry.   Cardiovascular:      Rate and Rhythm: Normal rate and regular rhythm.      Pulses: Normal pulses.      Heart sounds: Normal heart sounds. No murmur heard.     No friction rub. No gallop.   Pulmonary:      Effort: Pulmonary effort is normal.      Breath sounds: Normal breath sounds.   Abdominal:      General: Bowel sounds are normal.      Palpations: Abdomen is soft.   Musculoskeletal:         General: Tenderness present. No swelling or deformity.      Cervical back: Normal range of motion and neck supple.      Right lower leg: No edema.      Left lower leg: No edema.   Skin:     General: Skin is warm and dry.      Coloration: Skin is not pale.      Findings: No erythema or rash.   Neurological:      General: No focal deficit present.      Mental Status: He is alert. Mental status is at baseline. He is disoriented.   Psychiatric:         Mood and Affect: Mood normal.         Behavior: Behavior normal.         Thought Content: Thought content normal.         Judgment: Judgment normal.            Current/Home Meds    Current Outpatient Medications:     aspirin 325 mg tablet, Take 1 tablet (325 mg) by mouth once daily., Disp: , Rfl:     atorvastatin (Lipitor) 80 mg tablet, TAKE 1 TABLET BY MOUTH EVERY DAY, Disp: 90 tablet, Rfl: 3    b complex 0.4 mg tablet, Take 1 tablet by mouth once daily., Disp: , Rfl:     calcium carbonate-vitamin D3 (Oyster Shell) 250 mg-3.125 mcg (125 unit) tablet, Take 1 tablet by mouth 2 times a day with meals., Disp: , Rfl:     cholecalciferol (Vitamin D-3) 5,000 Units tablet, Take by mouth., Disp: , Rfl:     chrm/vineg/bit-orang peel/gr t (APPLE CIDER VINEGAR PLUS ORAL), Take by mouth., Disp: , Rfl:     clopidogrel (Plavix) 75 mg tablet, Take 1 tablet (75 mg) by mouth once daily., Disp: , Rfl:     dapaglifloz propaned-metformin (Xigduo XR) 5-1,000 mg, Take 1 tablet by  mouth once daily with a meal., Disp: 90 tablet, Rfl: 3    dilTIAZem LA (Cardizem LA) 240 mg 24 hr tablet, Take 1.5 tablets by mouth 1 (one) time each day., Disp: , Rfl:     DULoxetine (Cymbalta) 60 mg DR capsule, Take 1 capsule (60 mg) by mouth once daily., Disp: , Rfl:     EPINEPHrine (EpiPen) 0.3 mg/0.3 mL injection syringe, 0.3 mL (0.3 mg). Inject into upper leg. Call 911 after use., Disp: , Rfl:     fish oil concentrate (Omega-3) 120-180 mg capsule, Take 6 capsules (6,000 mg) by mouth once daily., Disp: , Rfl:     hydrOXYzine pamoate (Vistaril) 50 mg capsule, Take 1 capsule (50 mg) by mouth 1 time., Disp: , Rfl:     isosorbide mononitrate ER (Imdur) 30 mg 24 hr tablet, Take 1 tablet (30 mg) by mouth once daily., Disp: , Rfl:     ketamine HCl (ketamine, bulk,) 100 % powder, 1 puff nasally 4 times daily as needed Dsp# 12 ml, Disp: , Rfl:     lidocaine HCl, bulk, 100 % powder powder, , Disp: , Rfl:     losartan (Cozaar) 50 mg tablet, TAKE 1 TABLET BY MOUTH EVERY DAY, Disp: 90 tablet, Rfl: 3    Myrbetriq 50 mg tablet extended release 24 hr 24 hr tablet, Take 1 tablet (50 mg) by mouth once daily., Disp: , Rfl:     nitroglycerin (Nitrostat) 0.4 mg SL tablet, Place under the tongue., Disp: , Rfl:     NON FORMULARY, Alive veggie based vitamins, Disp: , Rfl:     NON FORMULARY, Medical Marijuana, Disp: , Rfl:     pantoprazole (ProtoNix) 40 mg EC tablet, Take 1 tablet (40 mg) by mouth once daily., Disp: , Rfl:     pregabalin (Lyrica) 50 mg capsule, Take 1 capsule (50 mg) by mouth 3 times a day., Disp: , Rfl:     pregabalin (Lyrica) 50 mg capsule, Take 1 capsule (50 mg) by mouth 2 times a day., Disp: 90 capsule, Rfl: 2    pregabalin (Lyrica) 50 mg capsule, Take 1 capsule (50 mg) by mouth 3 times a day., Disp: 90 capsule, Rfl: 2    promethazine (Phenergan) 25 mg tablet, Take 1 tablet (25 mg) by mouth every 6 hours if needed for nausea., Disp: 30 tablet, Rfl: 1    rOPINIRole (Requip) 1 mg tablet, Take 1 tablet (1 mg) by mouth  once daily at bedtime., Disp: , Rfl:     sertraline (Zoloft) 50 mg tablet, Take 1 tablet (50 mg) by mouth once daily., Disp: , Rfl:     tamsulosin (Flomax) 0.4 mg 24 hr capsule, Take 1 capsule (0.4 mg) by mouth once daily., Disp: , Rfl:     Tiadylt  mg 24 hr capsule, Take 1 capsule (360 mg) by mouth once daily., Disp: , Rfl:     tiZANidine (Zanaflex) 4 mg tablet, Take 1 tablet (4 mg) by mouth 3 times a day as needed for muscle spasms., Disp: , Rfl:     traZODone (Desyrel) 100 mg tablet, Take 1 tablet (100 mg) by mouth once daily at bedtime. Take one half or one whole tablet about 20 min before bedtime, Disp: 90 tablet, Rfl: 3    VITAMIN B COMPLEX ORAL, Take 1 tablet by mouth 1 (one) time each day., Disp: , Rfl:        Labs     BUN  20  Cr 1.4  K 4.8  Magnesium 2.52    CHOLESTEROL 133  HDL 40.2  LDL 58  TG  176        Cardiac Service Results:    October 2023 echo:  Normal LV systolic function with an EF of 60%  Grade I diastolic dysfunction    MARCH 15, 23 CARDIAC CATH: NON OBSTRUCTIVE CAD, PATENT STENTS     JANUARY 18, 2023 NM STRESS TEST: ABNORMAL PERFUSION STRESS, MILD ANTEROSEPTAL MYOCARDIAL ISCHEMIA. ABNORMAL LV FUNCTION. EF 49% .COMPARED TO PRIOR STUDY, THERE IS A SMALL ANTEROSEPTAL DEFECT PREVIOUSLY DESCRIBED AS FIXED IS NOW APPEAR REVERSIBLE WHICH MAY SUGGEST ARTIFACT. LVEF WAS PREVIOUSLY 63%          Assessment/Plan    CAD:  Stable Angina.  Most recent labs show:  BUN  20  Cr 1.4  K 4.8  Magnesium 2.52    CHOLESTEROL 133  HDL 40.2  LDL 58  TG  176    His most recent echo shows a normal LV systolic function with an EF of 60%.  Grade I diastolic dysfunction    His BP is well controlled at 109/72    His weight is 113 kg, however he is doing weight lifting and cardio now.    Continue ASA. Statin, diltiazem, losartan and plavix and low saturated fat, low sodium diet.   Continue exercise regimen and follow up with me in 6 months or sooner for any questions or concerns.

## 2023-11-28 RX ORDER — PROMETHAZINE HYDROCHLORIDE 25 MG/ML
12.5 INJECTION, SOLUTION INTRAMUSCULAR; INTRAVENOUS ONCE
Status: CANCELLED | OUTPATIENT
Start: 2023-11-30 | End: 2023-11-30

## 2023-11-28 RX ORDER — DIPHENHYDRAMINE HYDROCHLORIDE 50 MG/ML
50 INJECTION INTRAMUSCULAR; INTRAVENOUS AS NEEDED
Status: CANCELLED | OUTPATIENT
Start: 2023-11-30

## 2023-11-28 RX ORDER — NITROGLYCERIN 0.4 MG/1
0.4 TABLET SUBLINGUAL ONCE
Status: CANCELLED | OUTPATIENT
Start: 2023-11-30 | End: 2023-11-30

## 2023-11-28 RX ORDER — EPINEPHRINE 0.3 MG/.3ML
0.3 INJECTION SUBCUTANEOUS EVERY 5 MIN PRN
Status: CANCELLED | OUTPATIENT
Start: 2023-11-30

## 2023-11-28 RX ORDER — FAMOTIDINE 10 MG/ML
20 INJECTION INTRAVENOUS ONCE AS NEEDED
Status: CANCELLED | OUTPATIENT
Start: 2023-11-30

## 2023-11-28 RX ORDER — ALBUTEROL SULFATE 0.83 MG/ML
3 SOLUTION RESPIRATORY (INHALATION) AS NEEDED
Status: CANCELLED | OUTPATIENT
Start: 2023-11-30

## 2023-11-28 RX ORDER — METOCLOPRAMIDE HYDROCHLORIDE 5 MG/ML
10 INJECTION INTRAMUSCULAR; INTRAVENOUS ONCE
Status: CANCELLED | OUTPATIENT
Start: 2023-11-30 | End: 2023-11-30

## 2023-11-28 RX ORDER — ONDANSETRON HYDROCHLORIDE 2 MG/ML
4 INJECTION, SOLUTION INTRAVENOUS ONCE
Status: CANCELLED | OUTPATIENT
Start: 2023-11-30 | End: 2023-11-30

## 2023-11-28 RX ORDER — DIPHENHYDRAMINE HYDROCHLORIDE 50 MG/ML
25 INJECTION INTRAMUSCULAR; INTRAVENOUS ONCE
Status: CANCELLED | OUTPATIENT
Start: 2023-11-30 | End: 2023-11-30

## 2023-11-28 RX ORDER — METOPROLOL TARTRATE 25 MG/1
25 TABLET, FILM COATED ORAL ONCE
Status: CANCELLED | OUTPATIENT
Start: 2023-11-30 | End: 2023-11-30

## 2023-11-30 ENCOUNTER — INFUSION (OUTPATIENT)
Dept: INFUSION THERAPY | Facility: CLINIC | Age: 58
End: 2023-11-30
Payer: COMMERCIAL

## 2023-11-30 VITALS
SYSTOLIC BLOOD PRESSURE: 94 MMHG | DIASTOLIC BLOOD PRESSURE: 71 MMHG | HEART RATE: 91 BPM | RESPIRATION RATE: 11 BRPM | TEMPERATURE: 97.7 F | OXYGEN SATURATION: 94 %

## 2023-11-30 DIAGNOSIS — G89.29 CHRONIC BILATERAL LOW BACK PAIN WITH LEFT-SIDED SCIATICA: ICD-10-CM

## 2023-11-30 DIAGNOSIS — G89.0 CENTRAL PAIN SYNDROME: Primary | ICD-10-CM

## 2023-11-30 DIAGNOSIS — Z96.89 SPINAL CORD STIMULATOR STATUS: ICD-10-CM

## 2023-11-30 DIAGNOSIS — M54.42 CHRONIC BILATERAL LOW BACK PAIN WITH LEFT-SIDED SCIATICA: ICD-10-CM

## 2023-11-30 PROCEDURE — 2500000005 HC RX 250 GENERAL PHARMACY W/O HCPCS: Performed by: NURSE PRACTITIONER

## 2023-11-30 PROCEDURE — 96368 THER/DIAG CONCURRENT INF: CPT | Mod: INF

## 2023-11-30 PROCEDURE — 96375 TX/PRO/DX INJ NEW DRUG ADDON: CPT | Mod: INF

## 2023-11-30 PROCEDURE — 2500000004 HC RX 250 GENERAL PHARMACY W/ HCPCS (ALT 636 FOR OP/ED)

## 2023-11-30 PROCEDURE — 2500000004 HC RX 250 GENERAL PHARMACY W/ HCPCS (ALT 636 FOR OP/ED): Performed by: NURSE PRACTITIONER

## 2023-11-30 PROCEDURE — 96365 THER/PROPH/DIAG IV INF INIT: CPT | Mod: INF

## 2023-11-30 RX ORDER — KETOROLAC TROMETHAMINE 30 MG/ML
30 INJECTION, SOLUTION INTRAMUSCULAR; INTRAVENOUS ONCE
Status: COMPLETED | OUTPATIENT
Start: 2023-11-30 | End: 2023-11-30

## 2023-11-30 RX ORDER — DIPHENHYDRAMINE HYDROCHLORIDE 50 MG/ML
25 INJECTION INTRAMUSCULAR; INTRAVENOUS ONCE
Status: CANCELLED | OUTPATIENT
Start: 2023-12-14 | End: 2023-12-14

## 2023-11-30 RX ORDER — FAMOTIDINE 10 MG/ML
20 INJECTION INTRAVENOUS ONCE AS NEEDED
Status: CANCELLED | OUTPATIENT
Start: 2023-12-14

## 2023-11-30 RX ORDER — KETOROLAC TROMETHAMINE 30 MG/ML
30 INJECTION, SOLUTION INTRAMUSCULAR; INTRAVENOUS ONCE
Status: CANCELLED | OUTPATIENT
Start: 2023-12-14 | End: 2023-12-14

## 2023-11-30 RX ORDER — METOPROLOL TARTRATE 25 MG/1
25 TABLET, FILM COATED ORAL ONCE
Status: CANCELLED | OUTPATIENT
Start: 2023-12-14 | End: 2023-12-14

## 2023-11-30 RX ORDER — DIPHENHYDRAMINE HYDROCHLORIDE 50 MG/ML
50 INJECTION INTRAMUSCULAR; INTRAVENOUS AS NEEDED
Status: CANCELLED | OUTPATIENT
Start: 2023-12-14

## 2023-11-30 RX ORDER — EPINEPHRINE 0.3 MG/.3ML
0.3 INJECTION SUBCUTANEOUS EVERY 5 MIN PRN
Status: CANCELLED | OUTPATIENT
Start: 2023-12-14

## 2023-11-30 RX ORDER — NITROGLYCERIN 0.4 MG/1
0.4 TABLET SUBLINGUAL ONCE
Status: CANCELLED | OUTPATIENT
Start: 2023-12-14 | End: 2023-12-14

## 2023-11-30 RX ORDER — METOCLOPRAMIDE HYDROCHLORIDE 5 MG/ML
10 INJECTION INTRAMUSCULAR; INTRAVENOUS ONCE
Status: CANCELLED | OUTPATIENT
Start: 2023-12-14 | End: 2023-12-14

## 2023-11-30 RX ORDER — ONDANSETRON HYDROCHLORIDE 2 MG/ML
4 INJECTION, SOLUTION INTRAVENOUS ONCE
Status: CANCELLED | OUTPATIENT
Start: 2023-12-14 | End: 2023-12-14

## 2023-11-30 RX ORDER — ALBUTEROL SULFATE 0.83 MG/ML
3 SOLUTION RESPIRATORY (INHALATION) AS NEEDED
Status: CANCELLED | OUTPATIENT
Start: 2023-12-14

## 2023-11-30 RX ORDER — KETOROLAC TROMETHAMINE 30 MG/ML
INJECTION, SOLUTION INTRAMUSCULAR; INTRAVENOUS
Status: COMPLETED
Start: 2023-11-30 | End: 2023-11-30

## 2023-11-30 RX ORDER — PROMETHAZINE HYDROCHLORIDE 25 MG/ML
12.5 INJECTION, SOLUTION INTRAMUSCULAR; INTRAVENOUS ONCE
Status: CANCELLED | OUTPATIENT
Start: 2023-12-14 | End: 2023-12-14

## 2023-11-30 RX ADMIN — KETOROLAC TROMETHAMINE 30 MG: 30 INJECTION, SOLUTION INTRAMUSCULAR; INTRAVENOUS at 09:15

## 2023-11-30 RX ADMIN — PROPOFOL 100 MG: 10 INJECTION, EMULSION INTRAVENOUS at 09:15

## 2023-11-30 RX ADMIN — KETOROLAC TROMETHAMINE 30 MG: 30 INJECTION, SOLUTION INTRAMUSCULAR at 09:15

## 2023-11-30 RX ADMIN — Medication: at 09:15

## 2023-11-30 ASSESSMENT — PAIN - FUNCTIONAL ASSESSMENT: PAIN_FUNCTIONAL_ASSESSMENT: 0-10

## 2023-11-30 ASSESSMENT — ENCOUNTER SYMPTOMS
OCCASIONAL FEELINGS OF UNSTEADINESS: 0
LOSS OF SENSATION IN FEET: 1
DEPRESSION: 1

## 2023-11-30 ASSESSMENT — COLUMBIA-SUICIDE SEVERITY RATING SCALE - C-SSRS
2. HAVE YOU ACTUALLY HAD ANY THOUGHTS OF KILLING YOURSELF?: NO
1. IN THE PAST MONTH, HAVE YOU WISHED YOU WERE DEAD OR WISHED YOU COULD GO TO SLEEP AND NOT WAKE UP?: NO
6. HAVE YOU EVER DONE ANYTHING, STARTED TO DO ANYTHING, OR PREPARED TO DO ANYTHING TO END YOUR LIFE?: NO

## 2023-11-30 ASSESSMENT — PATIENT HEALTH QUESTIONNAIRE - PHQ9
SUM OF ALL RESPONSES TO PHQ9 QUESTIONS 1 AND 2: 2
10. IF YOU CHECKED OFF ANY PROBLEMS, HOW DIFFICULT HAVE THESE PROBLEMS MADE IT FOR YOU TO DO YOUR WORK, TAKE CARE OF THINGS AT HOME, OR GET ALONG WITH OTHER PEOPLE: NOT DIFFICULT AT ALL
2. FEELING DOWN, DEPRESSED OR HOPELESS: SEVERAL DAYS
1. LITTLE INTEREST OR PLEASURE IN DOING THINGS: SEVERAL DAYS

## 2023-11-30 ASSESSMENT — PAIN SCALES - GENERAL
PAINLEVEL_OUTOF10: 2
PAINLEVEL: 6

## 2023-11-30 NOTE — PROGRESS NOTES
S: Patient here for 13 opioid sparing pain infusion. Patient reports 80% reduction in pain after last infusion that lasted 18 days    Purpose of pain infusion meds explained along with potential side effects.  Patient verbalized understanding.    B: Pain Issues 6/10 lower left back    A: Patient currently has pain described on flow sheet documentation. Designated  is wife. Patient last ate solid food 4 hours ago, and had liquid 2 hours ago.    R: Plan; Obtain IV access, do patient risk assessment, and start opioid sparing infusion as ordered. Monitoring for S/S of adverse reactions.

## 2023-11-30 NOTE — PATIENT INSTRUCTIONS
Post infusion teaching provided via handout and verbal instruction. Patient verbalized understanding. VSS, Patient states pain is 2/10. Will assist patient to waiting car via wheelchair.

## 2023-12-04 ENCOUNTER — APPOINTMENT (OUTPATIENT)
Dept: UROLOGY | Facility: CLINIC | Age: 58
End: 2023-12-04
Payer: COMMERCIAL

## 2023-12-04 ENCOUNTER — APPOINTMENT (OUTPATIENT)
Dept: GASTROENTEROLOGY | Facility: CLINIC | Age: 58
End: 2023-12-04
Payer: COMMERCIAL

## 2023-12-06 ENCOUNTER — ANCILLARY PROCEDURE (OUTPATIENT)
Dept: RADIOLOGY | Facility: CLINIC | Age: 58
End: 2023-12-06
Payer: COMMERCIAL

## 2023-12-06 DIAGNOSIS — G90.59 COMPLEX REGIONAL PAIN SYNDROME I OF OTHER SPECIFIED SITE: ICD-10-CM

## 2023-12-06 DIAGNOSIS — E11.9 TYPE 2 DIABETES MELLITUS WITHOUT COMPLICATIONS (MULTI): ICD-10-CM

## 2023-12-06 DIAGNOSIS — N28.1 CYST OF KIDNEY, ACQUIRED: ICD-10-CM

## 2023-12-06 DIAGNOSIS — G89.0 CENTRAL PAIN SYNDROME: ICD-10-CM

## 2023-12-06 PROCEDURE — 76770 US EXAM ABDO BACK WALL COMP: CPT | Performed by: STUDENT IN AN ORGANIZED HEALTH CARE EDUCATION/TRAINING PROGRAM

## 2023-12-06 PROCEDURE — 72072 X-RAY EXAM THORAC SPINE 3VWS: CPT | Performed by: STUDENT IN AN ORGANIZED HEALTH CARE EDUCATION/TRAINING PROGRAM

## 2023-12-06 PROCEDURE — 76770 US EXAM ABDO BACK WALL COMP: CPT

## 2023-12-06 PROCEDURE — 72072 X-RAY EXAM THORAC SPINE 3VWS: CPT

## 2023-12-07 ENCOUNTER — TELEPHONE (OUTPATIENT)
Dept: GASTROENTEROLOGY | Facility: CLINIC | Age: 58
End: 2023-12-07
Payer: COMMERCIAL

## 2023-12-07 NOTE — TELEPHONE ENCOUNTER
PT LVM in regards to a procedure tomorrow was calling for prep, upon further review or chart and appointments, pt has an office visit with Dr. Hare tomorrow. Attempted to call pt, LVM

## 2023-12-08 ENCOUNTER — TELEPHONE (OUTPATIENT)
Dept: GASTROENTEROLOGY | Facility: CLINIC | Age: 58
End: 2023-12-08

## 2023-12-08 ENCOUNTER — OFFICE VISIT (OUTPATIENT)
Dept: GASTROENTEROLOGY | Facility: CLINIC | Age: 58
End: 2023-12-08
Payer: COMMERCIAL

## 2023-12-08 VITALS
WEIGHT: 246 LBS | HEIGHT: 69 IN | BODY MASS INDEX: 36.43 KG/M2 | SYSTOLIC BLOOD PRESSURE: 118 MMHG | DIASTOLIC BLOOD PRESSURE: 81 MMHG | HEART RATE: 89 BPM

## 2023-12-08 DIAGNOSIS — K57.32 DIVERTICULITIS OF COLON: Primary | ICD-10-CM

## 2023-12-08 DIAGNOSIS — R93.5 ABNORMAL COMPUTED TOMOGRAPHY OF ABDOMEN AND PELVIS: ICD-10-CM

## 2023-12-08 DIAGNOSIS — R10.9 ABDOMINAL PAIN, UNSPECIFIED ABDOMINAL LOCATION: ICD-10-CM

## 2023-12-08 PROCEDURE — 3074F SYST BP LT 130 MM HG: CPT | Performed by: STUDENT IN AN ORGANIZED HEALTH CARE EDUCATION/TRAINING PROGRAM

## 2023-12-08 PROCEDURE — 3051F HG A1C>EQUAL 7.0%<8.0%: CPT | Performed by: STUDENT IN AN ORGANIZED HEALTH CARE EDUCATION/TRAINING PROGRAM

## 2023-12-08 PROCEDURE — 3061F NEG MICROALBUMINURIA REV: CPT | Performed by: STUDENT IN AN ORGANIZED HEALTH CARE EDUCATION/TRAINING PROGRAM

## 2023-12-08 PROCEDURE — 3079F DIAST BP 80-89 MM HG: CPT | Performed by: STUDENT IN AN ORGANIZED HEALTH CARE EDUCATION/TRAINING PROGRAM

## 2023-12-08 PROCEDURE — 99204 OFFICE O/P NEW MOD 45 MIN: CPT | Performed by: STUDENT IN AN ORGANIZED HEALTH CARE EDUCATION/TRAINING PROGRAM

## 2023-12-08 PROCEDURE — 3048F LDL-C <100 MG/DL: CPT | Performed by: STUDENT IN AN ORGANIZED HEALTH CARE EDUCATION/TRAINING PROGRAM

## 2023-12-08 PROCEDURE — 4010F ACE/ARB THERAPY RXD/TAKEN: CPT | Performed by: STUDENT IN AN ORGANIZED HEALTH CARE EDUCATION/TRAINING PROGRAM

## 2023-12-08 PROCEDURE — 1036F TOBACCO NON-USER: CPT | Performed by: STUDENT IN AN ORGANIZED HEALTH CARE EDUCATION/TRAINING PROGRAM

## 2023-12-08 RX ORDER — SODIUM CHLORIDE 0.9 % (FLUSH) 0.9 %
10 SYRINGE (ML) INJECTION AS NEEDED
OUTPATIENT
Start: 2023-12-08

## 2023-12-08 RX ORDER — SODIUM CHLORIDE 0.9 % (FLUSH) 0.9 %
10 SYRINGE (ML) INJECTION EVERY 12 HOURS
OUTPATIENT
Start: 2023-12-08

## 2023-12-08 RX ORDER — PANTOPRAZOLE SODIUM 40 MG/1
40 TABLET, DELAYED RELEASE ORAL DAILY
Qty: 30 TABLET | Refills: 11 | Status: SHIPPED | OUTPATIENT
Start: 2023-12-08 | End: 2024-12-07

## 2023-12-08 RX ORDER — SODIUM CHLORIDE, SODIUM LACTATE, POTASSIUM CHLORIDE, CALCIUM CHLORIDE 600; 310; 30; 20 MG/100ML; MG/100ML; MG/100ML; MG/100ML
20 INJECTION, SOLUTION INTRAVENOUS CONTINUOUS
OUTPATIENT
Start: 2024-01-10

## 2023-12-08 RX ORDER — SODIUM, POTASSIUM,MAG SULFATES 17.5-3.13G
1 SOLUTION, RECONSTITUTED, ORAL ORAL 2 TIMES DAILY
Qty: 354 ML | Refills: 0 | Status: ON HOLD | OUTPATIENT
Start: 2023-12-08 | End: 2024-01-10 | Stop reason: ALTCHOICE

## 2023-12-08 RX ORDER — POLYETHYLENE GLYCOL 3350 17 G/17G
17 POWDER, FOR SOLUTION ORAL 2 TIMES DAILY
Qty: 14 PACKET | Refills: 0 | Status: SHIPPED | OUTPATIENT
Start: 2023-12-08 | End: 2023-12-15

## 2023-12-08 NOTE — PROGRESS NOTES
58-year-old gentleman who is on disability for back pain, used to work as a banker, history of CAD/PCI x8 in 2019, Prinzmetal angina, diabetes, COPD, back pain, CKD presenting for follow-up after admission for diverticulitis in 10/14/2023.  Patient denies similar symptoms in the past.    EGD never  Colonoscopy 2019 pandiverticulosis, inadequate prep  Maternal grandmother had stomach cancer  1 bowel movement per day  Denies NSAIDs, social alcohol use, denies drug use smoking, positive marijuana             The note was created using voice recognition transcription software. Despite proofreading, unintentional typographical errors may be present. Please contact the GI office with any questions or concerns.     Current Medications: reviewed    A 10 point review of system is negative except for what is mentioned in the HPI    Follow up with GI was advised       Vital Signs: Reviewed    Physical Exam:  General: no apparent distress, pleasant and cooperative  Skin:  Warm and dry, no jaundice  HEENT: No scleral icterus, no conjunctival pallor, normocephalic, atraumatic, mucous membranes moist  Neck:  atraumatic, trachea midline, no JVD  Chest:  decreased air entry to auscultation bilaterally. No wheezes, rales, or rhonchi  CV:  Regular rate and rhythm.  Positive S1/S2  Abdomen: no distension, +BS, soft, non-tender to palpation, no rebound tenderness, no guarding, no rigidity, no discernible ascites   Extremities: no lower extremity edema, Chronic pigmentary changes, no cyanosis  Neurological:  A&Ox3 , no asterixis  Psychiatric: cooperative     Investigations:  Labs, radiological imaging and cardiac work up were reviewed    1-hepatitis C antibody - 7/2022    2-BMI 36, healthy lifestyle advised    3-Healthcare maintenance, recent diverticulitis in 10/14/2023, will schedule colonoscopy after 1 week of twice daily MiraLAX, cardiology clearance    4- recent diverticulitis in 10/14/2023, will schedule colonoscopy after 1  week of twice daily MiraLAX, cardiology clearance, Metamucil daily    5-long-term aspirin 325 and Plavix intake, start pantoprazole daily

## 2023-12-08 NOTE — PATIENT INSTRUCTIONS
1-we need to schedule for colonoscopy, for 1 week before your procedure please start taking MiraLAX twice a day to make sure that your prep is clean in addition to the gallon the day before your procedure  2-because you are taking 2 blood thinners, please start taking pantoprazole 40 mg once daily half an hour before breakfast for stomach protection  3-please continue taking the Metamucil daily, good job with following a healthy diet and trying to stay active

## 2023-12-11 ENCOUNTER — TELEPHONE (OUTPATIENT)
Dept: PRIMARY CARE | Facility: CLINIC | Age: 58
End: 2023-12-11

## 2023-12-11 ENCOUNTER — OFFICE VISIT (OUTPATIENT)
Dept: PRIMARY CARE | Facility: CLINIC | Age: 58
End: 2023-12-11
Payer: COMMERCIAL

## 2023-12-11 VITALS
HEART RATE: 91 BPM | SYSTOLIC BLOOD PRESSURE: 115 MMHG | DIASTOLIC BLOOD PRESSURE: 78 MMHG | BODY MASS INDEX: 36.58 KG/M2 | RESPIRATION RATE: 16 BRPM | WEIGHT: 247 LBS | HEIGHT: 69 IN

## 2023-12-11 DIAGNOSIS — I25.10 CORONARY ARTERY DISEASE INVOLVING NATIVE CORONARY ARTERY OF NATIVE HEART WITHOUT ANGINA PECTORIS: ICD-10-CM

## 2023-12-11 DIAGNOSIS — G61.9 INFLAMMATORY NEUROPATHY (MULTI): ICD-10-CM

## 2023-12-11 DIAGNOSIS — E78.2 MIXED HYPERLIPIDEMIA: ICD-10-CM

## 2023-12-11 DIAGNOSIS — E11.65 TYPE 2 DIABETES MELLITUS WITH HYPERGLYCEMIA, WITHOUT LONG-TERM CURRENT USE OF INSULIN (MULTI): Primary | ICD-10-CM

## 2023-12-11 DIAGNOSIS — E11.65 TYPE 2 DIABETES MELLITUS WITH HYPERGLYCEMIA, WITHOUT LONG-TERM CURRENT USE OF INSULIN (MULTI): ICD-10-CM

## 2023-12-11 PROCEDURE — 99214 OFFICE O/P EST MOD 30 MIN: CPT | Performed by: INTERNAL MEDICINE

## 2023-12-11 PROCEDURE — 4010F ACE/ARB THERAPY RXD/TAKEN: CPT | Performed by: INTERNAL MEDICINE

## 2023-12-11 PROCEDURE — 3051F HG A1C>EQUAL 7.0%<8.0%: CPT | Performed by: INTERNAL MEDICINE

## 2023-12-11 PROCEDURE — 3074F SYST BP LT 130 MM HG: CPT | Performed by: INTERNAL MEDICINE

## 2023-12-11 PROCEDURE — 1036F TOBACCO NON-USER: CPT | Performed by: INTERNAL MEDICINE

## 2023-12-11 PROCEDURE — 3078F DIAST BP <80 MM HG: CPT | Performed by: INTERNAL MEDICINE

## 2023-12-11 PROCEDURE — 3061F NEG MICROALBUMINURIA REV: CPT | Performed by: INTERNAL MEDICINE

## 2023-12-11 PROCEDURE — 3048F LDL-C <100 MG/DL: CPT | Performed by: INTERNAL MEDICINE

## 2023-12-11 RX ORDER — METOCLOPRAMIDE HYDROCHLORIDE 5 MG/ML
10 INJECTION INTRAMUSCULAR; INTRAVENOUS ONCE
Status: CANCELLED | OUTPATIENT
Start: 2023-12-14 | End: 2023-12-14

## 2023-12-11 RX ORDER — FAMOTIDINE 10 MG/ML
20 INJECTION INTRAVENOUS ONCE AS NEEDED
Status: CANCELLED | OUTPATIENT
Start: 2023-12-14

## 2023-12-11 RX ORDER — EPINEPHRINE 0.3 MG/.3ML
0.3 INJECTION SUBCUTANEOUS EVERY 5 MIN PRN
Status: CANCELLED | OUTPATIENT
Start: 2023-12-14

## 2023-12-11 RX ORDER — PROMETHAZINE HYDROCHLORIDE 25 MG/ML
12.5 INJECTION, SOLUTION INTRAMUSCULAR; INTRAVENOUS ONCE
Status: CANCELLED | OUTPATIENT
Start: 2023-12-14 | End: 2023-12-14

## 2023-12-11 RX ORDER — DIPHENHYDRAMINE HYDROCHLORIDE 50 MG/ML
50 INJECTION INTRAMUSCULAR; INTRAVENOUS AS NEEDED
Status: CANCELLED | OUTPATIENT
Start: 2023-12-14

## 2023-12-11 RX ORDER — METOPROLOL TARTRATE 25 MG/1
25 TABLET, FILM COATED ORAL ONCE
Status: CANCELLED | OUTPATIENT
Start: 2023-12-14 | End: 2023-12-14

## 2023-12-11 RX ORDER — DIPHENHYDRAMINE HYDROCHLORIDE 50 MG/ML
25 INJECTION INTRAMUSCULAR; INTRAVENOUS ONCE
Status: CANCELLED | OUTPATIENT
Start: 2023-12-14 | End: 2023-12-14

## 2023-12-11 RX ORDER — ONDANSETRON HYDROCHLORIDE 2 MG/ML
4 INJECTION, SOLUTION INTRAVENOUS ONCE
Status: CANCELLED | OUTPATIENT
Start: 2023-12-14 | End: 2023-12-14

## 2023-12-11 RX ORDER — NITROGLYCERIN 0.4 MG/1
0.4 TABLET SUBLINGUAL ONCE
Status: CANCELLED | OUTPATIENT
Start: 2023-12-14 | End: 2023-12-14

## 2023-12-11 RX ORDER — ALBUTEROL SULFATE 0.83 MG/ML
3 SOLUTION RESPIRATORY (INHALATION) AS NEEDED
Status: CANCELLED | OUTPATIENT
Start: 2023-12-14

## 2023-12-11 ASSESSMENT — PATIENT HEALTH QUESTIONNAIRE - PHQ9
SUM OF ALL RESPONSES TO PHQ9 QUESTIONS 1 AND 2: 2
1. LITTLE INTEREST OR PLEASURE IN DOING THINGS: SEVERAL DAYS
2. FEELING DOWN, DEPRESSED OR HOPELESS: SEVERAL DAYS

## 2023-12-11 ASSESSMENT — PAIN SCALES - GENERAL: PAINLEVEL: 0-NO PAIN

## 2023-12-11 NOTE — PROGRESS NOTES
"Subjective   Logan Campos is a 58 y.o. male who presents for Follow-up (Pt here for diabetes follow up ).    He would like to begin Ozempic   His Aic was 7.3, average sugar around 160  His fasting sugar at that time was  His last creatinine was 1.40  He ws over 300 lbs when pandemic started  He is slowly losing weight    Total cholesterol 133, LDL was 58.  AST ALT are normal.   Review of Systems    Objective   /78 (BP Location: Left arm, Patient Position: Sitting, BP Cuff Size: Adult)   Pulse 91   Resp 16   Ht 1.753 m (5' 9\")   Wt 112 kg (247 lb)   BMI 36.48 kg/m²    Physical Exam  Visit Vitals  /78 (BP Location: Left arm, Patient Position: Sitting, BP Cuff Size: Adult)   Pulse 91   Resp 16   Ht 1.753 m (5' 9\")   Wt 112 kg (247 lb)   BMI 36.48 kg/m²   Smoking Status Never   BSA 2.34 m²      GEN: NAD  HEENT: normal  NECK: no adenopathy, no thyroid enlargment  LUNGS: CTAB  CV: reg S1/S2 no murmurs  EXT: no leg edema   Assessment/Plan   Problem List Items Addressed This Visit       CAD (coronary artery disease) continue on atorvastatin 80 mg daily, clopidogrel 75 mg daily and aspirin 325.    Type 2 diabetes mellitus with hyperglycemia, without long-term current use of insulin (CMS/[facility]) - Primary  he is taking xigduo 5/1000 once daily.  His EGFR shows some decrease from 75 mL/min from March to 59 currently, which could affect metformin dosing .  He does not have proteinuria.  I believe he would strongly benefit from semaglutide.  I sent Rx to [facility] pharmacy, although he is on the  employee plan so that may need to change.  He does say that he has been getting his other prescriptions from [facility].  I consulted Earnest Ambrose PharmD from pharmacy and discussed this with the patient as well.    Relevant Medications    semaglutide 0.25 mg or 0.5 mg (2 mg/3 mL) pen injector    Other Relevant Orders    Comprehensive Metabolic Panel    Lipid Panel    TSH with reflex to Free T4 if abnormal    Follow Up In Advanced " Primary Care - Pharmacy    Mixed hyperlipidemia remain on atorvastatin.    Relevant Orders    Comprehensive Metabolic Panel    Lipid Panel    Inflammatory neuropathy (CMS/[facility])     Remains on duloxetine. I am not sure if he is still using Lyrica .  He is undergoing infusions from pain management. He does also use medical marijuana.

## 2023-12-11 NOTE — PATIENT INSTRUCTIONS
Inject once weekly  Will begin the 0.25 mg dose, once a week for the first month.   Can cause nausea, diarrhea or constipation.  Side effects can peak in 24 to 48 hours.     Our clinical pharmacist will contact you to see how are doing and can help you along with dose increases.    Remain on Xigduo while on this.     See me in 3 months.  Get blood test for A1c, thyroid and chemistry .

## 2023-12-12 ENCOUNTER — APPOINTMENT (OUTPATIENT)
Dept: PRIMARY CARE | Facility: CLINIC | Age: 58
End: 2023-12-12
Payer: COMMERCIAL

## 2023-12-12 NOTE — ASSESSMENT & PLAN NOTE
Remains on duloxetine. I am not sure if he is still using Lyrica .  He is undergoing infusions from pain management.

## 2023-12-13 ENCOUNTER — TELEPHONE (OUTPATIENT)
Dept: UROLOGY | Facility: CLINIC | Age: 58
End: 2023-12-13
Payer: COMMERCIAL

## 2023-12-13 DIAGNOSIS — K76.0 FATTY LIVER: ICD-10-CM

## 2023-12-13 NOTE — TELEPHONE ENCOUNTER
Tsering from [facility] states the insurance will not cover the ozempic without an alternatives first    BN

## 2023-12-13 NOTE — TELEPHONE ENCOUNTER
----- Message from Eric Preston MD sent at 12/8/2023  6:54 PM EST -----  Regarding: Abnormal ultrasound  Crystal, please contact patient and inform renal ultrasound does not show any stones or tumors.  However there is evidence of a fatty liver.  Please obtain LFTs prior to his visit on January 12, 2024.  ----- Message -----  From: Interface, Radiology Results In  Sent: 12/7/2023   5:43 PM EST  To: Eric Preston MD

## 2023-12-14 ENCOUNTER — APPOINTMENT (OUTPATIENT)
Dept: INFUSION THERAPY | Facility: CLINIC | Age: 58
End: 2023-12-14
Payer: MEDICARE

## 2023-12-14 ENCOUNTER — INFUSION (OUTPATIENT)
Dept: INFUSION THERAPY | Facility: CLINIC | Age: 58
End: 2023-12-14
Payer: COMMERCIAL

## 2023-12-14 VITALS
SYSTOLIC BLOOD PRESSURE: 120 MMHG | OXYGEN SATURATION: 96 % | DIASTOLIC BLOOD PRESSURE: 79 MMHG | RESPIRATION RATE: 17 BRPM | HEART RATE: 82 BPM | TEMPERATURE: 97.5 F

## 2023-12-14 DIAGNOSIS — G89.29 CHRONIC BILATERAL LOW BACK PAIN WITH LEFT-SIDED SCIATICA: ICD-10-CM

## 2023-12-14 DIAGNOSIS — M54.42 CHRONIC BILATERAL LOW BACK PAIN WITH LEFT-SIDED SCIATICA: ICD-10-CM

## 2023-12-14 DIAGNOSIS — Z96.89 SPINAL CORD STIMULATOR STATUS: ICD-10-CM

## 2023-12-14 DIAGNOSIS — G89.0 CENTRAL PAIN SYNDROME: Primary | ICD-10-CM

## 2023-12-14 PROCEDURE — 96368 THER/DIAG CONCURRENT INF: CPT | Mod: INF

## 2023-12-14 PROCEDURE — 2500000004 HC RX 250 GENERAL PHARMACY W/ HCPCS (ALT 636 FOR OP/ED)

## 2023-12-14 PROCEDURE — 96365 THER/PROPH/DIAG IV INF INIT: CPT | Mod: INF

## 2023-12-14 PROCEDURE — 96375 TX/PRO/DX INJ NEW DRUG ADDON: CPT | Mod: INF

## 2023-12-14 PROCEDURE — 2500000005 HC RX 250 GENERAL PHARMACY W/O HCPCS: Performed by: NURSE PRACTITIONER

## 2023-12-14 PROCEDURE — 2500000004 HC RX 250 GENERAL PHARMACY W/ HCPCS (ALT 636 FOR OP/ED): Performed by: NURSE PRACTITIONER

## 2023-12-14 RX ORDER — ALBUTEROL SULFATE 0.83 MG/ML
3 SOLUTION RESPIRATORY (INHALATION) AS NEEDED
Status: CANCELLED | OUTPATIENT
Start: 2023-12-28

## 2023-12-14 RX ORDER — METOCLOPRAMIDE HYDROCHLORIDE 5 MG/ML
10 INJECTION INTRAMUSCULAR; INTRAVENOUS ONCE
Status: CANCELLED | OUTPATIENT
Start: 2023-12-28 | End: 2023-12-28

## 2023-12-14 RX ORDER — ONDANSETRON HYDROCHLORIDE 2 MG/ML
4 INJECTION, SOLUTION INTRAVENOUS ONCE
Status: CANCELLED | OUTPATIENT
Start: 2023-12-28 | End: 2023-12-28

## 2023-12-14 RX ORDER — METOPROLOL TARTRATE 25 MG/1
25 TABLET, FILM COATED ORAL ONCE
Status: CANCELLED | OUTPATIENT
Start: 2023-12-28 | End: 2023-12-28

## 2023-12-14 RX ORDER — KETOROLAC TROMETHAMINE 30 MG/ML
30 INJECTION, SOLUTION INTRAMUSCULAR; INTRAVENOUS ONCE
Status: COMPLETED | OUTPATIENT
Start: 2023-12-14 | End: 2023-12-14

## 2023-12-14 RX ORDER — KETOROLAC TROMETHAMINE 30 MG/ML
30 INJECTION, SOLUTION INTRAMUSCULAR; INTRAVENOUS ONCE
Status: CANCELLED | OUTPATIENT
Start: 2023-12-28 | End: 2023-12-28

## 2023-12-14 RX ORDER — DIPHENHYDRAMINE HYDROCHLORIDE 50 MG/ML
50 INJECTION INTRAMUSCULAR; INTRAVENOUS AS NEEDED
Status: CANCELLED | OUTPATIENT
Start: 2023-12-28

## 2023-12-14 RX ORDER — NITROGLYCERIN 0.4 MG/1
0.4 TABLET SUBLINGUAL ONCE
Status: CANCELLED | OUTPATIENT
Start: 2023-12-28 | End: 2023-12-28

## 2023-12-14 RX ORDER — FAMOTIDINE 10 MG/ML
20 INJECTION INTRAVENOUS ONCE AS NEEDED
Status: CANCELLED | OUTPATIENT
Start: 2023-12-28

## 2023-12-14 RX ORDER — PROMETHAZINE HYDROCHLORIDE 25 MG/ML
12.5 INJECTION, SOLUTION INTRAMUSCULAR; INTRAVENOUS ONCE
Status: CANCELLED | OUTPATIENT
Start: 2023-12-28 | End: 2023-12-28

## 2023-12-14 RX ORDER — DIPHENHYDRAMINE HYDROCHLORIDE 50 MG/ML
25 INJECTION INTRAMUSCULAR; INTRAVENOUS ONCE
Status: CANCELLED | OUTPATIENT
Start: 2023-12-28 | End: 2023-12-28

## 2023-12-14 RX ORDER — KETOROLAC TROMETHAMINE 30 MG/ML
INJECTION, SOLUTION INTRAMUSCULAR; INTRAVENOUS
Status: COMPLETED
Start: 2023-12-14 | End: 2023-12-14

## 2023-12-14 RX ORDER — EPINEPHRINE 0.3 MG/.3ML
0.3 INJECTION SUBCUTANEOUS EVERY 5 MIN PRN
Status: CANCELLED | OUTPATIENT
Start: 2023-12-28

## 2023-12-14 RX ADMIN — KETOROLAC TROMETHAMINE 30 MG: 30 INJECTION, SOLUTION INTRAMUSCULAR at 09:05

## 2023-12-14 RX ADMIN — PROPOFOL 100 MG: 10 INJECTION, EMULSION INTRAVENOUS at 09:06

## 2023-12-14 RX ADMIN — Medication: at 09:06

## 2023-12-14 RX ADMIN — KETOROLAC TROMETHAMINE 30 MG: 30 INJECTION, SOLUTION INTRAMUSCULAR; INTRAVENOUS at 09:05

## 2023-12-14 ASSESSMENT — PATIENT HEALTH QUESTIONNAIRE - PHQ9
10. IF YOU CHECKED OFF ANY PROBLEMS, HOW DIFFICULT HAVE THESE PROBLEMS MADE IT FOR YOU TO DO YOUR WORK, TAKE CARE OF THINGS AT HOME, OR GET ALONG WITH OTHER PEOPLE: NOT DIFFICULT AT ALL
2. FEELING DOWN, DEPRESSED OR HOPELESS: SEVERAL DAYS
1. LITTLE INTEREST OR PLEASURE IN DOING THINGS: NOT AT ALL
SUM OF ALL RESPONSES TO PHQ9 QUESTIONS 1 AND 2: 1

## 2023-12-14 ASSESSMENT — ENCOUNTER SYMPTOMS
LOSS OF SENSATION IN FEET: 1
DEPRESSION: 0
OCCASIONAL FEELINGS OF UNSTEADINESS: 0

## 2023-12-14 ASSESSMENT — LIFESTYLE VARIABLES
HOW MANY STANDARD DRINKS CONTAINING ALCOHOL DO YOU HAVE ON A TYPICAL DAY: PATIENT DOES NOT DRINK
HOW OFTEN DO YOU HAVE SIX OR MORE DRINKS ON ONE OCCASION: NEVER

## 2023-12-14 ASSESSMENT — PAIN - FUNCTIONAL ASSESSMENT
PAIN_FUNCTIONAL_ASSESSMENT: 0-10
PAIN_FUNCTIONAL_ASSESSMENT: 0-10

## 2023-12-14 ASSESSMENT — PAIN DESCRIPTION - DESCRIPTORS
DESCRIPTORS: ACHING
DESCRIPTORS: ACHING

## 2023-12-14 ASSESSMENT — PAIN SCALES - GENERAL
PAINLEVEL_OUTOF10: 7
PAINLEVEL_OUTOF10: 2

## 2023-12-14 NOTE — PROGRESS NOTES
S: Patient here for #14 opioid sparing pain infusion. Patient reports 75% reduction in pain after last infusion that lasted 1week.    Purpose of pain infusion meds explained along with potential side effects.  Patient verbalized understanding.    B: Pain Issues    A: Patient currently has pain described on flow sheet documentation. Designated  is Paulo (wife). Patient last ate solid food >10 hours ago, and had liquid >10 hours ago.    R: Plan; Obtain IV access, do patient risk assessment, and start opioid sparing infusion as ordered. Monitoring for S/S of adverse reactions.

## 2023-12-14 NOTE — PROGRESS NOTES
Post infusion teaching provided via handout and verbal instruction. Patient verbalized understanding. VSS, Patient states pain is 2/10 tolerable

## 2023-12-15 RX ORDER — SEMAGLUTIDE 0.68 MG/ML
0.25 INJECTION, SOLUTION SUBCUTANEOUS
Refills: 0 | OUTPATIENT
Start: 2023-12-15

## 2023-12-22 ENCOUNTER — TELEMEDICINE (OUTPATIENT)
Dept: PHARMACY | Facility: HOSPITAL | Age: 58
End: 2023-12-22
Payer: COMMERCIAL

## 2023-12-22 ENCOUNTER — APPOINTMENT (OUTPATIENT)
Dept: PHARMACY | Facility: HOSPITAL | Age: 58
End: 2023-12-22
Payer: COMMERCIAL

## 2023-12-22 DIAGNOSIS — E11.65 TYPE 2 DIABETES MELLITUS WITH HYPERGLYCEMIA, WITHOUT LONG-TERM CURRENT USE OF INSULIN (MULTI): ICD-10-CM

## 2023-12-22 ASSESSMENT — ENCOUNTER SYMPTOMS: DIABETIC ASSOCIATED SYMPTOMS: 0

## 2023-12-22 NOTE — PROGRESS NOTES
[facility] ([facility])    Logan Campos is a 58 y.o. male was referred to Clinical Pharmacy Team to complete a comprehensive medication review (CMR) with a pharmacist as part of the [employer] diabetes program. Will continue to follow with patient for DM management.     Referring Provider: Magda Gilman, DO    Subjective   Allergies   Allergen Reactions    Cat Dander Shortness of breath    Onion Anaphylaxis    Hydromorphone Rash and Other     Agent: Dilaudid       [id number] - [place], [place] - [address] [facility]  [address] [address]  Phone: [phone] Fax: [phone]    [facility] - [place], [place] - [facility] [facility]  [address] [address]  Phone: [phone] Fax: [phone]      Diabetes  He presents for his initial diabetic visit. He has type 2 diabetes mellitus. There are no diabetic associated symptoms. There are no hypoglycemic complications. Diabetic complications include peripheral neuropathy. An ACE inhibitor/angiotensin II receptor blocker is being taken.     Current diet: reducing sugar and carbohydrate intake, does not eat for 12 hours a day (after dinner till breakfast the next day)     Current exercise: swimming 3x/week, occasional weight lifting or yoga at home    The patient does not have a known family history of diabetes.    Patient is using: glucometer  The patient is currently checking the blood glucose 1-2 times per day.    AMFBG average  mg/dL    Hypoglycemia frequency: infrequent  Hypoglycemia awareness: Yes     Diabetes Pharmacotherapy:  Xigduo XR 5-1000 mg 1 tablet daily    SECONDARY PREVENTION  - Statin? Yes  - ACE-I/ARB? Yes  - Aspirin? No    Pertinent PMH Review:  - PMH of Pancreatitis: No  - PMH of Retinopathy: No  - PMH of Urinary Tract Infections: No  - PMH of MTC: No  Objective     There were no vitals taken for this visit.     LAB  Lab Results   Component  Value Date    BILITOT 0.5 10/12/2023    CALCIUM 10.0 10/12/2023    CO2 27 10/12/2023     10/12/2023    CREATININE 1.40 (H) 10/12/2023    GLUCOSE 157 (H) 10/12/2023    ALKPHOS 108 10/12/2023    K 4.8 10/12/2023    PROT 7.5 10/12/2023     10/12/2023    AST 20 10/12/2023    ALT 25 10/12/2023    BUN 20 10/12/2023    ANIONGAP 17 10/12/2023    MG 2.52 (H) 10/12/2023    PHOS 3.4 03/07/2023    ALBUMIN 4.6 10/12/2023    GFRMALE 75 03/07/2023     Lab Results   Component Value Date    TRIG 176 (H) 10/12/2023    CHOL 133 10/12/2023    LDLCALC 58 10/12/2023    HDL 40.2 10/12/2023     Lab Results   Component Value Date    HGBA1C 7.3 (H) 10/12/2023       Current Outpatient Medications on File Prior to Visit   Medication Sig Dispense Refill    aspirin 325 mg tablet Take 1 tablet (325 mg) by mouth once daily.      atorvastatin (Lipitor) 80 mg tablet TAKE 1 TABLET BY MOUTH EVERY DAY 90 tablet 3    cholecalciferol (Vitamin D-3) 5,000 Units tablet Take 1 tablet (5,000 Units) by mouth once daily.      chrm/vineg/bit-orang peel/gr t (APPLE CIDER VINEGAR PLUS ORAL) Take by mouth once daily.      clopidogrel (Plavix) 75 mg tablet Take 1 tablet (75 mg) by mouth once daily.      dapaglifloz propaned-metformin (Xigduo XR) 5-1,000 mg Take 1 tablet by mouth once daily with a meal. 90 tablet 3    DULoxetine (Cymbalta) 60 mg DR capsule Take 1 capsule (60 mg) by mouth once daily.      fish oil concentrate (Omega-3) 120-180 mg capsule Take 6 capsules (6,000 mg) by mouth once daily.      hydrOXYzine pamoate (Vistaril) 50 mg capsule Take 1 capsule (50 mg) by mouth 1 time.      isosorbide mononitrate ER (Imdur) 30 mg 24 hr tablet Take 1 tablet (30 mg) by mouth once daily.      ketamine HCl (ketamine, bulk,) 100 % powder 1 puff nasally 4 times daily as needed Dsp# 12 ml      lidocaine HCl, bulk, 100 % powder powder In ketamine nasal spray      losartan (Cozaar) 50 mg tablet TAKE 1 TABLET BY MOUTH EVERY DAY 90 tablet 3    Myrbetriq 50 mg  tablet extended release 24 hr 24 hr tablet Take 1 tablet (50 mg) by mouth once daily.      NON FORMULARY Medical Marijuana      pantoprazole (ProtoNix) 40 mg EC tablet Take 1 tablet (40 mg) by mouth once daily. Do not crush, chew, or split. 30 tablet 11    pregabalin (Lyrica) 50 mg capsule Take 1 capsule (50 mg) by mouth 3 times a day. 90 capsule 2    promethazine (Phenergan) 25 mg tablet Take 1 tablet (25 mg) by mouth every 6 hours if needed for nausea. 30 tablet 1    rOPINIRole (Requip) 1 mg tablet Take 1 tablet (1 mg) by mouth once daily at bedtime.      sertraline (Zoloft) 50 mg tablet Take 1 tablet (50 mg) by mouth once daily.      tamsulosin (Flomax) 0.4 mg 24 hr capsule Take 1 capsule (0.4 mg) by mouth once daily.      Tiadylt  mg 24 hr capsule Take 1 capsule (360 mg) by mouth once daily.      tiZANidine (Zanaflex) 4 mg tablet Take 1 tablet (4 mg) by mouth 3 times a day as needed for muscle spasms.      VITAMIN B COMPLEX ORAL Take 1 tablet by mouth 1 (one) time each day.      [DISCONTINUED] b complex 0.4 mg tablet Take 1 tablet by mouth once daily.      [DISCONTINUED] dilTIAZem LA (Cardizem LA) 360 mg 24 hr tablet Take 1 tablet (360 mg) by mouth once daily.      [DISCONTINUED] pantoprazole (ProtoNix) 40 mg EC tablet Take 1 tablet (40 mg) by mouth once daily.      EPINEPHrine (EpiPen) 0.3 mg/0.3 mL injection syringe 0.3 mL (0.3 mg). Inject into upper leg. Call 911 after use.      nitroglycerin (Nitrostat) 0.4 mg SL tablet Place under the tongue.      NON FORMULARY Alive veggie based vitamins      [] polyethylene glycol (Glycolax, Miralax) 17 gram packet Take 17 g by mouth 2 times a day for 7 days. 14 packet 0    semaglutide 0.25 mg or 0.5 mg (2 mg/3 mL) pen injector Inject 0.25 mg under the skin 1 (one) time per week. (Patient not taking: Reported on 2023) 9 mL 0    sodium,potassium,mag sulfates (Suprep) 17.5-3.13-1.6 gram recon soln solution Take 1 bottle by mouth 2 times a day. Take one  bottle twice as directed by the prep instructions 354 mL 0    traZODone (Desyrel) 100 mg tablet Take 1 tablet (100 mg) by mouth once daily at bedtime. Take one half or one whole tablet about 20 min before bedtime 90 tablet 3    [DISCONTINUED] calcium carbonate-vitamin D3 (Oyster Shell) 250 mg-3.125 mcg (125 unit) tablet Take 1 tablet by mouth 2 times a day with meals.      [DISCONTINUED] pregabalin (Lyrica) 50 mg capsule Take 1 capsule (50 mg) by mouth 3 times a day.      [DISCONTINUED] pregabalin (Lyrica) 50 mg capsule Take 1 capsule (50 mg) by mouth 2 times a day. 90 capsule 2     No current facility-administered medications on file prior to visit.        HISTORICAL PHARMACOTHERAPY (MED+REASON FOR DC)  -Metformin - changed to Xigduo XR    DRUG INTERACTIONS  - No significant drug interactions identified at this time    ASSESSMENT/PLAN:   Employee Health Diabetes Program (VBID)  - Patient enrolled in  Employee diabetes program for $0 co-pays on diabetes medications/supplies. Enrollment should be active in 2-4 weeks.  - Requested VBID enrollment date:   - PharmD Management Level: 4    Assessment/Plan   Problem List Items Addressed This Visit       Type 2 diabetes mellitus with hyperglycemia, without long-term current use of insulin (CMS/[facility])   Patients diabetes needs improvement with most recent A1c of 7.3% (Goal < 7%).   Initiate: Mounjaro 2.5 mg once weekly  Continue: Xigduo XR 5-1000 mg once daily with a meal  Compliance at present is estimated to be excellent  Medication reconciled with the patient  PA submitted for Mounjaro through CoverMyMeds key: [id number]  Education Provided to Patient: side effects, administration and storage of GLP-1 Donnie   Follow-up: will follow-up after VBID enrollment and PA decision  PCP Follow-Up: 3/21/2024    Continue all meds under the continuation of care with the referring provider and clinical pharmacy team.    Earnest Ambrose, Mirela     Verbal consent to manage patient's drug  therapy was obtained from [the patient and/or an individual authorized to act on behalf of a patient]. They were informed they may decline to participate or withdraw from participation in pharmacy services at any time.

## 2023-12-22 NOTE — PATIENT INSTRUCTIONS
See me in 6 months.  We can discuss ozempic in the future if you want to try it.  Get blood and urine test done close to next visit after fasting.   Order is in chart.   
22-Dec-2023 16:01

## 2023-12-26 DIAGNOSIS — N32.81 OAB (OVERACTIVE BLADDER): ICD-10-CM

## 2023-12-26 RX ORDER — TAMSULOSIN HYDROCHLORIDE 0.4 MG/1
0.4 CAPSULE ORAL DAILY
Qty: 90 CAPSULE | Refills: 3 | Status: SHIPPED | OUTPATIENT
Start: 2023-12-26

## 2023-12-26 RX ORDER — MIRABEGRON 50 MG/1
50 TABLET, FILM COATED, EXTENDED RELEASE ORAL DAILY
Qty: 90 TABLET | Refills: 3 | Status: SHIPPED | OUTPATIENT
Start: 2023-12-26

## 2024-01-04 ENCOUNTER — PREP FOR PROCEDURE (OUTPATIENT)
Dept: PAIN MEDICINE | Facility: CLINIC | Age: 59
End: 2024-01-04
Payer: COMMERCIAL

## 2024-01-04 DIAGNOSIS — G89.4 CHRONIC PAIN SYNDROME: Primary | ICD-10-CM

## 2024-01-04 DIAGNOSIS — G89.0 CENTRAL PAIN SYNDROME: Primary | ICD-10-CM

## 2024-01-04 RX ORDER — TIZANIDINE 4 MG/1
4 TABLET ORAL 3 TIMES DAILY PRN
Qty: 90 TABLET | Refills: 11 | Status: SHIPPED | OUTPATIENT
Start: 2024-01-04

## 2024-01-04 RX ORDER — CEFAZOLIN SODIUM 2 G/100ML
2 INJECTION, SOLUTION INTRAVENOUS ONCE
Status: CANCELLED | OUTPATIENT
Start: 2024-01-10 | End: 2024-01-04

## 2024-01-05 DIAGNOSIS — E78.2 MIXED HYPERLIPIDEMIA: ICD-10-CM

## 2024-01-05 DIAGNOSIS — G61.9 INFLAMMATORY NEUROPATHY (MULTI): Primary | ICD-10-CM

## 2024-01-05 RX ORDER — DULOXETIN HYDROCHLORIDE 60 MG/1
60 CAPSULE, DELAYED RELEASE ORAL DAILY
Qty: 30 CAPSULE | Refills: 11 | Status: SHIPPED | OUTPATIENT
Start: 2024-01-05 | End: 2024-04-03 | Stop reason: SDUPTHER

## 2024-01-08 RX ORDER — CLOPIDOGREL BISULFATE 75 MG/1
75 TABLET ORAL DAILY
Qty: 90 TABLET | Refills: 3 | Status: SHIPPED | OUTPATIENT
Start: 2024-01-08 | End: 2024-01-16 | Stop reason: SDUPTHER

## 2024-01-09 ENCOUNTER — PRE-ADMISSION TESTING (OUTPATIENT)
Dept: PREADMISSION TESTING | Facility: HOSPITAL | Age: 59
End: 2024-01-09
Payer: MEDICARE

## 2024-01-09 VITALS
DIASTOLIC BLOOD PRESSURE: 77 MMHG | HEART RATE: 81 BPM | HEIGHT: 68 IN | WEIGHT: 250.44 LBS | RESPIRATION RATE: 20 BRPM | BODY MASS INDEX: 37.96 KG/M2 | SYSTOLIC BLOOD PRESSURE: 116 MMHG | OXYGEN SATURATION: 95 % | TEMPERATURE: 96.8 F

## 2024-01-09 DIAGNOSIS — Z01.818 PRE-OP TESTING: ICD-10-CM

## 2024-01-09 PROCEDURE — 93005 ELECTROCARDIOGRAM TRACING: CPT

## 2024-01-09 PROCEDURE — 99203 OFFICE O/P NEW LOW 30 MIN: CPT | Performed by: NURSE PRACTITIONER

## 2024-01-09 ASSESSMENT — DUKE ACTIVITY SCORE INDEX (DASI)
CAN YOU HAVE SEXUAL RELATIONS: YES
CAN YOU RUN A SHORT DISTANCE: YES
CAN YOU CLIMB A FLIGHT OF STAIRS OR WALK UP A HILL: YES
CAN YOU DO LIGHT WORK AROUND THE HOUSE LIKE DUSTING OR WASHING DISHES: YES
CAN YOU WALK INDOORS, SUCH AS AROUND YOUR HOUSE: YES
DASI METS SCORE: 9.9
CAN YOU TAKE CARE OF YOURSELF (EAT, DRESS, BATHE, OR USE TOILET): YES
CAN YOU DO HEAVY WORK AROUND THE HOUSE LIKE SCRUBBING FLOORS OR LIFTING AND MOVING HEAVY FURNITURE: YES
CAN YOU PARTICIPATE IN MODERATE RECREATIONAL ACTIVITIES LIKE GOLF, BOWLING, DANCING, DOUBLES TENNIS OR THROWING A BASEBALL OR FOOTBALL: YES
CAN YOU PARTICIPATE IN STRENOUS SPORTS LIKE SWIMMING, SINGLES TENNIS, FOOTBALL, BASKETBALL, OR SKIING: YES
CAN YOU DO MODERATE WORK AROUND THE HOUSE LIKE VACUUMING, SWEEPING FLOORS OR CARRYING GROCERIES: YES
CAN YOU WALK A BLOCK OR TWO ON LEVEL GROUND: YES
CAN YOU DO YARD WORK LIKE RAKING LEAVES, WEEDING OR PUSHING A MOWER: YES
TOTAL_SCORE: 58.2

## 2024-01-09 ASSESSMENT — PAIN - FUNCTIONAL ASSESSMENT: PAIN_FUNCTIONAL_ASSESSMENT: 0-10

## 2024-01-09 ASSESSMENT — PAIN SCALES - GENERAL: PAINLEVEL_OUTOF10: 0 - NO PAIN

## 2024-01-09 NOTE — PREPROCEDURE INSTRUCTIONS
Medication List            Accurate as of January 9, 2024 10:08 AM. Always use your most recent med list.                APPLE CIDER VINEGAR PLUS ORAL  Medication Adjustments for Surgery: Continue until night before surgery     aspirin 325 mg tablet  Medication Adjustments for Surgery: Stop 7 days before surgery     atorvastatin 80 mg tablet  Commonly known as: Lipitor  TAKE 1 TABLET BY MOUTH EVERY DAY  Medication Adjustments for Surgery: Continue until night before surgery     cholecalciferol 5,000 Units tablet  Commonly known as: Vitamin D-3  Medication Adjustments for Surgery: Stop 7 days before surgery     clopidogrel 75 mg tablet  Commonly known as: Plavix  Take 1 tablet (75 mg) by mouth once daily.  Medication Adjustments for Surgery: Stop 7 days before surgery     dapaglifloz propaned-metformin 5-1,000 mg  Commonly known as: Xigduo XR  Take 1 tablet by mouth once daily with a meal.  Medication Adjustments for Surgery: Continue until night before surgery     DULoxetine 60 mg DR capsule  Commonly known as: Cymbalta  Take 1 capsule (60 mg) by mouth once daily.  Medication Adjustments for Surgery: Continue until night before surgery     EpiPen 0.3 mg/0.3 mL injection syringe  Generic drug: EPINEPHrine  Medication Adjustments for Surgery: Other (Comment)     fish oil concentrate 120-180 mg capsule  Commonly known as: Omega-3  Medication Adjustments for Surgery: Stop 7 days before surgery     hydrOXYzine pamoate 50 mg capsule  Commonly known as: Vistaril  Medication Adjustments for Surgery: Continue until night before surgery     isosorbide mononitrate ER 30 mg 24 hr tablet  Commonly known as: Imdur  Medication Adjustments for Surgery: Continue until night before surgery     ketamine (bulk) 100 % powder  Medication Adjustments for Surgery: Continue until night before surgery     lidocaine HCl (bulk) 100 % powder powder  Medication Adjustments for Surgery: Continue until night before surgery     losartan 50 mg  tablet  Commonly known as: Cozaar  TAKE 1 TABLET BY MOUTH EVERY DAY  Medication Adjustments for Surgery: Take morning of surgery with sip of water, no other fluids     Myrbetriq 50 mg tablet extended release 24 hr 24 hr tablet  Generic drug: mirabegron  Take 1 tablet (50 mg) by mouth once daily.  Medication Adjustments for Surgery: Take morning of surgery with sip of water, no other fluids     nitroglycerin 0.4 mg SL tablet  Commonly known as: Nitrostat  Medication Adjustments for Surgery: Other (Comment)     NON FORMULARY  Medication Adjustments for Surgery: Continue until night before surgery     NON FORMULARY  Medication Adjustments for Surgery: Continue until night before surgery     pantoprazole 40 mg EC tablet  Commonly known as: ProtoNix  Take 1 tablet (40 mg) by mouth once daily. Do not crush, chew, or split.  Medication Adjustments for Surgery: Take morning of surgery with sip of water, no other fluids     pregabalin 50 mg capsule  Commonly known as: Lyrica  Take 1 capsule (50 mg) by mouth 3 times a day.  Medication Adjustments for Surgery: Take morning of surgery with sip of water, no other fluids     promethazine 25 mg tablet  Commonly known as: Phenergan  Take 1 tablet (25 mg) by mouth every 6 hours if needed for nausea.  Medication Adjustments for Surgery: Continue until night before surgery     rOPINIRole 1 mg tablet  Commonly known as: Requip  Medication Adjustments for Surgery: Continue until night before surgery     semaglutide 0.25 mg or 0.5 mg (2 mg/3 mL) pen injector  Inject 0.25 mg under the skin 1 (one) time per week.  Medication Adjustments for Surgery: Other (Comment)     sertraline 50 mg tablet  Commonly known as: Zoloft  Medication Adjustments for Surgery: Take morning of surgery with sip of water, no other fluids     sodium,potassium,mag sulfates 17.5-3.13-1.6 gram recon soln solution  Commonly known as: Suprep  Take 1 bottle by mouth 2 times a day. Take one bottle twice as directed by the  prep instructions  Medication Adjustments for Surgery: Other (Comment)     tamsulosin 0.4 mg 24 hr capsule  Commonly known as: Flomax  Take 1 capsule (0.4 mg) by mouth once daily.  Medication Adjustments for Surgery: Continue until night before surgery     Tiadylt  mg 24 hr capsule  Generic drug: dilTIAZem ER  Medication Adjustments for Surgery: Continue until night before surgery     tiZANidine 4 mg tablet  Commonly known as: Zanaflex  Take 1 tablet (4 mg) by mouth 3 times a day as needed for muscle spasms.  Medication Adjustments for Surgery: Continue until night before surgery     traZODone 100 mg tablet  Commonly known as: Desyrel  Take 1 tablet (100 mg) by mouth once daily at bedtime. Take one half or one whole tablet about 20 min before bedtime  Medication Adjustments for Surgery: Continue until night before surgery     VITAMIN B COMPLEX ORAL  Medication Adjustments for Surgery: Stop 7 days before surgery                              NPO Instructions:    Do not eat any food after midnight the night before your surgery/procedure.    Additional Instructions:     Day of Surgery:  Wear  comfortable loose fitting clothing  Do not use moisturizers, creams, lotions or perfume  All jewelry and valuables should be left at home                        PRE-OPERATIVE INSTRUCTIONS FOR SURGERY    *Do not eat anything after midnight the night of surgery.  This includes food of any kind (including hard candy, cough drops, mints and gum) and beverages (including coffee and soda).  You may drink up to one 8 ounce glass of water up until three hours before your scheduled surgery time.    *One of our staff members will call you ONE business day before your surgery, between 11a-2p  To let you know the time to arrive.  If you have no received a call by 2 pm, call [phone]  *When you arrive at the hospital-->GO TO Registration on the ground floor  *Stop smoking 24 hours prior to surgery.  No Marijuana, CBD Oil or Vaping for  48 hours  *No alcohol 24 hours prior to surgery  *You will need a responsible adult to drive you home  -No acrylic nails or nail polish on at least one fingernail, NO polish on toes for foot surgery  -You may be asked to remove your dentures, partial plate, eyeglasses or contact lenses before going to surgery.  Please bring a case for these items.  -Body piercings need to be removed.  Jewelry and valuables should be left at home.  -Put on loose,  comfortable, clean clothing, that will accommodate bandages    HOME PREOPERATIVE ANTIBACTERIAL SHOWER:  -This shower is a way of cleaning the skin with germ killing solution before surgery.  The solution contains Chorhexidine (CHG).   Let your Dr. Know if you are allergic to Chlorhexidine.    NIGHT BEFORE SURGERY    -Take a normal shower and wash your hair  -Turn OFF water to avoid rinsing the antibacterial skin cleanser off (CHG).  -Apply the CHG cleanser to a clean and wet washcloth.  Lather your entire body from the neck down.    -DO NOT USE ON THE HEAD, FACE, or GENITALS.  -RINSE immediately if CHG is in contact with your eyes, ears or mouth  -Gently wash your body.  Have the CHG cleanser stay on your skin for 3 MINUTES.  -After 3 minutes, turn on water and rinse the CHG cleanser off your body completely  -DO NOT WASH with regular soap after using CHG.  -PAT DRY with a clean fresh towel  -DO NOT apply any orourke, deodorants or lotions  -Dress in clean night cloths  **CHG cleanser will cause stains to fabrics if you wash them with bleach after use.     DAY OF SURGERY:  -Take shower-->JUST GET WET.  Turn OFF water.  -REPEAT the CHG cleanse as you did the night before.  -PAT DRY-->    What you may be asked to bring to surgery:  ___Crutches, walker  ___CPAP machine  ___Urine specimen

## 2024-01-09 NOTE — CPM/PAT H&P
CPM/PAT Evaluation       Name: Logan Campos (Logan Campos)  /Age: 1965/58 y.o.     In-Person       Chief Complaint: Failed spinal cord stimulator; Prinzmetal syndrome and Chronic back pain    58 yr old male with c/o failed spinal stimulator.  Reports hx of spinal stimulator placement  for cardiac arterial spasms and has been working well since and providing much relief from chest pain and need for Nitro pills.  Currently the battery only last a couple weeks so is here for a replacement, adding also to have a MRI compatible device.   Additionally, has ongoing progressive years long back pain.  Pain is constant ache w/intermittent throbbing, numbness/tingling to feet (L > R) and worsened by activity including walking and standing for long periods.  Has tried ketamine infusions and medications to gain some relief.  Reports remaining otherwise physically active, swimming routinely 3x per week; denies new cardiac or respiratory symptoms.  Denies past issues with anesthesia.         Past Medical History:   Diagnosis Date    Personal history of other diseases of the circulatory system     History of coronary atherosclerosis    Personal history of other diseases of the digestive system     History of gastroesophageal reflux (GERD)    Personal history of other diseases of the nervous system and sense organs     History of neuropathy    Pure hypercholesterolemia, unspecified     High cholesterol       Past Surgical History:   Procedure Laterality Date    BACK SURGERY  2014    Back Surgery    HERNIA REPAIR  2014    Hernia Repair    OTHER SURGICAL HISTORY  2014    Arterial Catheterization    TONSILLECTOMY  2014    Tonsillectomy    VASECTOMY  2014    Surgery Vas Deferens Vasectomy       Patient  has no history on file for sexual activity.    Family History   Problem Relation Name Age of Onset    Breast cancer Sister      Stomach cancer Maternal Grandmother         Allergies   Allergen  Reactions    Cat Dander Shortness of breath    Onion Anaphylaxis    Hydromorphone Rash and Other     Agent: Dilaudid       Prior to Admission medications    Medication Sig Start Date End Date Taking? Authorizing Provider   aspirin 325 mg tablet Take 1 tablet (325 mg) by mouth once daily. 12/19/14   Historical Provider, MD   atorvastatin (Lipitor) 80 mg tablet TAKE 1 TABLET BY MOUTH EVERY DAY 11/9/23   Magda Gilman DO   cholecalciferol (Vitamin D-3) 5,000 Units tablet Take 1 tablet (5,000 Units) by mouth once daily.    Historical Provider, MD   chrm/vineg/bit-orang peel/gr t (APPLE CIDER VINEGAR PLUS ORAL) Take by mouth once daily.    Historical Provider, MD   clopidogrel (Plavix) 75 mg tablet Take 1 tablet (75 mg) by mouth once daily. 1/8/24   Maia Johnson, APRN-CNP   dapaglifloz propaned-metformin (Xigduo XR) 5-1,000 mg Take 1 tablet by mouth once daily with a meal. 11/9/23   Magda Gilman DO   DULoxetine (Cymbalta) 60 mg DR capsule Take 1 capsule (60 mg) by mouth once daily. 1/5/24   Maira Stuart MD   EPINEPHrine (EpiPen) 0.3 mg/0.3 mL injection syringe 0.3 mL (0.3 mg). Inject into upper leg. Call 911 after use.    Historical Provider, MD   fish oil concentrate (Omega-3) 120-180 mg capsule Take 6 capsules (6,000 mg) by mouth once daily. 12/19/14   Historical Provider, MD   hydrOXYzine pamoate (Vistaril) 50 mg capsule Take 1 capsule (50 mg) by mouth 1 time.    Historical Provider, MD   isosorbide mononitrate ER (Imdur) 30 mg 24 hr tablet Take 1 tablet (30 mg) by mouth once daily. 12/27/13   Historical Provider, MD   ketamine HCl (ketamine, bulk,) 100 % powder 1 puff nasally 4 times daily as needed Dsp# 12 ml 2/28/23   Historical Provider, MD   lidocaine HCl, bulk, 100 % powder powder In ketamine nasal spray 4/6/23   Historical Provider, MD   losartan (Cozaar) 50 mg tablet TAKE 1 TABLET BY MOUTH EVERY DAY 11/9/23   Magda Gilman DO   Myrbetriq 50 mg tablet extended release 24 hr 24 hr tablet Take 1  tablet (50 mg) by mouth once daily. 12/26/23   Eric Preston MD   nitroglycerin (Nitrostat) 0.4 mg SL tablet Place under the tongue.    Historical Provider, MD   NON FORMULARY Alive veggie based vitamins    Historical Provider, MD   NON FORMULARY Medical Marijuana    Historical Provider, MD   pantoprazole (ProtoNix) 40 mg EC tablet Take 1 tablet (40 mg) by mouth once daily. Do not crush, chew, or split. 12/8/23 12/7/24  Santos Hare MD   pregabalin (Lyrica) 50 mg capsule Take 1 capsule (50 mg) by mouth 3 times a day. 11/2/23 1/31/24  Maira Stuart MD   promethazine (Phenergan) 25 mg tablet Take 1 tablet (25 mg) by mouth every 6 hours if needed for nausea. 10/16/23   Magda Gilman DO   rOPINIRole (Requip) 1 mg tablet Take 1 tablet (1 mg) by mouth once daily at bedtime.    Historical Provider, MD   semaglutide 0.25 mg or 0.5 mg (2 mg/3 mL) pen injector Inject 0.25 mg under the skin 1 (one) time per week.  Patient not taking: Reported on 12/22/2023 12/11/23   Magda Gilman DO   sertraline (Zoloft) 50 mg tablet Take 1 tablet (50 mg) by mouth once daily.    Historical Provider, MD   sodium,potassium,mag sulfates (Suprep) 17.5-3.13-1.6 gram recon soln solution Take 1 bottle by mouth 2 times a day. Take one bottle twice as directed by the prep instructions 12/8/23   Santos Hare MD   tamsulosin (Flomax) 0.4 mg 24 hr capsule Take 1 capsule (0.4 mg) by mouth once daily. 12/26/23   Eric Preston MD   Tiadylt  mg 24 hr capsule Take 1 capsule (360 mg) by mouth once daily. 3/15/23   Historical Provider, MD   tiZANidine (Zanaflex) 4 mg tablet Take 1 tablet (4 mg) by mouth 3 times a day as needed for muscle spasms. 1/4/24   Maira Stuart MD   traZODone (Desyrel) 100 mg tablet Take 1 tablet (100 mg) by mouth once daily at bedtime. Take one half or one whole tablet about 20 min before bedtime 3/22/23 9/18/23  Magda Gilman, DO   VITAMIN B COMPLEX ORAL Take 1 tablet by mouth 1 (one) time each day.     Historical Provider, MD   clopidogrel (Plavix) 75 mg tablet Take 1 tablet (75 mg) by mouth once daily. 3/3/14 1/5/24  Historical Provider, MD   DULoxetine (Cymbalta) 60 mg DR capsule Take 1 capsule (60 mg) by mouth once daily.  1/5/24  Historical Provider, MD   tiZANidine (Zanaflex) 4 mg tablet Take 1 tablet (4 mg) by mouth 3 times a day as needed for muscle spasms.  1/4/24  Historical Provider, MD        Review of Systems    Constitutional: no fever, no chills and not feeling poorly.   Eyes: no eyesight problems.   ENT: no hearing loss, no nosebleeds and no sore throat.   Cardiovascular: no chest pain, no palpitations and no extremity edema.   Respiratory: no shortness of breath, no wheezing, no cough and no sob with exertion.   Gastrointestinal: negative for abdominal pain, blood in stools or changes in bowel habits   Genitourinary: negative for dysuria, incontinence or changes in urinary habits   Musculoskeletal: no arthralgias and no gait abnormality.   Integumentary: negative for lesions, rash or itching.   Neurological: negative for confusion, dizziness, fainting or difficulty walking.   Psychiatric: not suicidal, no anxiety and no depression.   All other systems have been reviewed and are negative for complaint.     Physical Exam  Constitutional:       General: No acute distress.     Aox3, pleasant and cooperative, appropriate mood and eye contact   HENT:      Head: Normocephalic.      Mouth/Throat: Mucous membranes moist & pink  Eyes:      Vision grossly intact, PERRLA, corrective lenses in use   Neck:      No carotid bruit, no JVD  Cardiovascular:      RRR, S1S2, no murmurs, rubs or gallops  Pulmonary:      Symmetric chest expansion, CTA, Room Air  Abdominal:      Soft non-tender, BSx4   Skin:     Warm, dry & intact   Extremities:      No gross deformities; normal gait   Neurological:      No focal deficit, Aox3, RANKIN x4  Psychiatric:      Pleasant & cooperative, appropriate affect    PAT AIRWAY:   Airway:      Mallampati::  III    TM distance::  >3 FB    Neck ROM::  Full  normal        Visit Vitals  /77   Pulse 81   Temp 36 °C (96.8 °F) (Temporal)   Resp 20       DASI Risk Score    No data to display       Caprini DVT Assessment    No data to display       Modified Frailty Index    No data to display       CHADS2 Stroke Risk  Current as of about an hour ago        N/A 3 - 100%: High Risk   2 - 3%: Medium Risk   0 - 2%: Low Risk     Last Change: N/A          This score determines the patient's risk of having a stroke if the patient has atrial fibrillation.        This score is not applicable to this patient. Components are not calculated.          Revised Cardiac Risk Index    No data to display       Apfel Simplified Score    No data to display       Risk Analysis Index Results This Encounter    No data found in the last 1 encounters.         Assessment and Plan:     Followed by pain management, Failed spinal stimulator; Chronic back pain and Prinzmetal syndrome    Spinal cord stimulator leads & generator revision w/c-arm w/Dr Stuart on 1/10/2024

## 2024-01-10 ENCOUNTER — ANESTHESIA (OUTPATIENT)
Dept: OPERATING ROOM | Facility: HOSPITAL | Age: 59
End: 2024-01-10
Payer: COMMERCIAL

## 2024-01-10 ENCOUNTER — APPOINTMENT (OUTPATIENT)
Dept: RADIOLOGY | Facility: HOSPITAL | Age: 59
End: 2024-01-10
Payer: COMMERCIAL

## 2024-01-10 ENCOUNTER — ANESTHESIA EVENT (OUTPATIENT)
Dept: OPERATING ROOM | Facility: HOSPITAL | Age: 59
End: 2024-01-10
Payer: COMMERCIAL

## 2024-01-10 ENCOUNTER — HOSPITAL ENCOUNTER (OUTPATIENT)
Facility: HOSPITAL | Age: 59
Setting detail: OUTPATIENT SURGERY
Discharge: HOME | End: 2024-01-10
Attending: ANESTHESIOLOGY | Admitting: ANESTHESIOLOGY
Payer: COMMERCIAL

## 2024-01-10 VITALS
HEART RATE: 76 BPM | OXYGEN SATURATION: 95 % | DIASTOLIC BLOOD PRESSURE: 75 MMHG | SYSTOLIC BLOOD PRESSURE: 117 MMHG | TEMPERATURE: 97.7 F | RESPIRATION RATE: 15 BRPM

## 2024-01-10 DIAGNOSIS — G89.4 CHRONIC PAIN SYNDROME: Primary | ICD-10-CM

## 2024-01-10 DIAGNOSIS — Z01.818 PRE-OP TESTING: ICD-10-CM

## 2024-01-10 LAB — GLUCOSE BLD MANUAL STRIP-MCNC: 150 MG/DL (ref 74–99)

## 2024-01-10 PROCEDURE — A4217 STERILE WATER/SALINE, 500 ML: HCPCS | Performed by: ANESTHESIOLOGY

## 2024-01-10 PROCEDURE — 2500000004 HC RX 250 GENERAL PHARMACY W/ HCPCS (ALT 636 FOR OP/ED)

## 2024-01-10 PROCEDURE — 76000 FLUOROSCOPY <1 HR PHYS/QHP: CPT | Mod: 59

## 2024-01-10 PROCEDURE — 3600000003 HC OR TIME - INITIAL BASE CHARGE - PROCEDURE LEVEL THREE: Performed by: ANESTHESIOLOGY

## 2024-01-10 PROCEDURE — 3700000002 HC GENERAL ANESTHESIA TIME - EACH INCREMENTAL 1 MINUTE: Performed by: ANESTHESIOLOGY

## 2024-01-10 PROCEDURE — 2500000004 HC RX 250 GENERAL PHARMACY W/ HCPCS (ALT 636 FOR OP/ED): Performed by: ANESTHESIOLOGY

## 2024-01-10 PROCEDURE — 63688 REV/RMV IMP SP NPG/R DTCH CN: CPT | Performed by: ANESTHESIOLOGY

## 2024-01-10 PROCEDURE — A63688 PR REVISE/REMOVE SPINAL NEUROSTIM/RECEIVER: Performed by: NURSE ANESTHETIST, CERTIFIED REGISTERED

## 2024-01-10 PROCEDURE — C1820 GENERATOR NEURO RECHG BAT SY: HCPCS | Performed by: ANESTHESIOLOGY

## 2024-01-10 PROCEDURE — A63688 PR REVISE/REMOVE SPINAL NEUROSTIM/RECEIVER: Performed by: ANESTHESIOLOGY

## 2024-01-10 PROCEDURE — 2500000005 HC RX 250 GENERAL PHARMACY W/O HCPCS

## 2024-01-10 PROCEDURE — 2780000003 HC OR 278 NO HCPCS: Performed by: ANESTHESIOLOGY

## 2024-01-10 PROCEDURE — 3600000008 HC OR TIME - EACH INCREMENTAL 1 MINUTE - PROCEDURE LEVEL THREE: Performed by: ANESTHESIOLOGY

## 2024-01-10 PROCEDURE — 7100000009 HC PHASE TWO TIME - INITIAL BASE CHARGE: Performed by: ANESTHESIOLOGY

## 2024-01-10 PROCEDURE — 7100000001 HC RECOVERY ROOM TIME - INITIAL BASE CHARGE: Performed by: ANESTHESIOLOGY

## 2024-01-10 PROCEDURE — 2500000005 HC RX 250 GENERAL PHARMACY W/O HCPCS: Performed by: ANESTHESIOLOGY

## 2024-01-10 PROCEDURE — 7100000010 HC PHASE TWO TIME - EACH INCREMENTAL 1 MINUTE: Performed by: ANESTHESIOLOGY

## 2024-01-10 PROCEDURE — 63663 REVISE SPINE ELTRD PERQ ARAY: CPT | Performed by: ANESTHESIOLOGY

## 2024-01-10 PROCEDURE — 7100000002 HC RECOVERY ROOM TIME - EACH INCREMENTAL 1 MINUTE: Performed by: ANESTHESIOLOGY

## 2024-01-10 PROCEDURE — 3700000001 HC GENERAL ANESTHESIA TIME - INITIAL BASE CHARGE: Performed by: ANESTHESIOLOGY

## 2024-01-10 PROCEDURE — 82947 ASSAY GLUCOSE BLOOD QUANT: CPT

## 2024-01-10 PROCEDURE — 2720000007 HC OR 272 NO HCPCS: Performed by: ANESTHESIOLOGY

## 2024-01-10 DEVICE — ENVELOPE, NEURO, ABSORBABLE, MED: Type: IMPLANTABLE DEVICE | Site: SPINE THORACIC | Status: FUNCTIONAL

## 2024-01-10 DEVICE — IMPLANTABLE DEVICE: Type: IMPLANTABLE DEVICE | Site: BACK | Status: FUNCTIONAL

## 2024-01-10 RX ORDER — LIDOCAINE HYDROCHLORIDE 20 MG/ML
INJECTION, SOLUTION INFILTRATION; PERINEURAL AS NEEDED
Status: DISCONTINUED | OUTPATIENT
Start: 2024-01-10 | End: 2024-01-10

## 2024-01-10 RX ORDER — SODIUM CHLORIDE 0.9 % (FLUSH) 0.9 %
SYRINGE (ML) INJECTION AS NEEDED
Status: DISCONTINUED | OUTPATIENT
Start: 2024-01-10 | End: 2024-01-10 | Stop reason: HOSPADM

## 2024-01-10 RX ORDER — PROPOFOL 10 MG/ML
INJECTION, EMULSION INTRAVENOUS AS NEEDED
Status: DISCONTINUED | OUTPATIENT
Start: 2024-01-10 | End: 2024-01-10

## 2024-01-10 RX ORDER — SODIUM CHLORIDE, SODIUM LACTATE, POTASSIUM CHLORIDE, CALCIUM CHLORIDE 600; 310; 30; 20 MG/100ML; MG/100ML; MG/100ML; MG/100ML
INJECTION, SOLUTION INTRAVENOUS CONTINUOUS PRN
Status: DISCONTINUED | OUTPATIENT
Start: 2024-01-10 | End: 2024-01-10

## 2024-01-10 RX ORDER — CEPHALEXIN 500 MG/1
500 CAPSULE ORAL 4 TIMES DAILY
Qty: 40 CAPSULE | Refills: 0 | Status: SHIPPED | OUTPATIENT
Start: 2024-01-10 | End: 2024-01-20

## 2024-01-10 RX ORDER — MORPHINE SULFATE 2 MG/ML
2 INJECTION, SOLUTION INTRAMUSCULAR; INTRAVENOUS EVERY 5 MIN PRN
Status: DISCONTINUED | OUTPATIENT
Start: 2024-01-10 | End: 2024-01-10 | Stop reason: HOSPADM

## 2024-01-10 RX ORDER — MEPERIDINE HYDROCHLORIDE 25 MG/ML
12.5 INJECTION INTRAMUSCULAR; INTRAVENOUS; SUBCUTANEOUS EVERY 10 MIN PRN
Status: DISCONTINUED | OUTPATIENT
Start: 2024-01-10 | End: 2024-01-10 | Stop reason: HOSPADM

## 2024-01-10 RX ORDER — DEXAMETHASONE SODIUM PHOSPHATE 10 MG/ML
6 INJECTION INTRAMUSCULAR; INTRAVENOUS ONCE
Status: DISCONTINUED | OUTPATIENT
Start: 2024-01-10 | End: 2024-01-10 | Stop reason: HOSPADM

## 2024-01-10 RX ORDER — SODIUM CHLORIDE, SODIUM LACTATE, POTASSIUM CHLORIDE, CALCIUM CHLORIDE 600; 310; 30; 20 MG/100ML; MG/100ML; MG/100ML; MG/100ML
100 INJECTION, SOLUTION INTRAVENOUS CONTINUOUS
Status: DISCONTINUED | OUTPATIENT
Start: 2024-01-10 | End: 2024-01-10 | Stop reason: HOSPADM

## 2024-01-10 RX ORDER — CEFAZOLIN SODIUM 2 G/100ML
2 INJECTION, SOLUTION INTRAVENOUS ONCE
Status: COMPLETED | OUTPATIENT
Start: 2024-01-10 | End: 2024-01-10

## 2024-01-10 RX ORDER — ALBUTEROL SULFATE 0.83 MG/ML
2.5 SOLUTION RESPIRATORY (INHALATION) ONCE AS NEEDED
Status: DISCONTINUED | OUTPATIENT
Start: 2024-01-10 | End: 2024-01-10 | Stop reason: HOSPADM

## 2024-01-10 RX ORDER — OXYCODONE AND ACETAMINOPHEN 5; 325 MG/1; MG/1
1 TABLET ORAL EVERY 6 HOURS PRN
Qty: 21 TABLET | Refills: 0 | Status: SHIPPED | OUTPATIENT
Start: 2024-01-10 | End: 2024-01-17

## 2024-01-10 RX ORDER — ACETAMINOPHEN 325 MG/1
TABLET ORAL
Status: COMPLETED
Start: 2024-01-10 | End: 2024-01-10

## 2024-01-10 RX ORDER — ROCURONIUM BROMIDE 10 MG/ML
INJECTION, SOLUTION INTRAVENOUS AS NEEDED
Status: DISCONTINUED | OUTPATIENT
Start: 2024-01-10 | End: 2024-01-10

## 2024-01-10 RX ORDER — ACETAMINOPHEN 325 MG/1
650 TABLET ORAL EVERY 4 HOURS PRN
Status: DISCONTINUED | OUTPATIENT
Start: 2024-01-10 | End: 2024-01-10 | Stop reason: HOSPADM

## 2024-01-10 RX ORDER — PHENYLEPHRINE HCL IN 0.9% NACL 1 MG/10 ML
SYRINGE (ML) INTRAVENOUS AS NEEDED
Status: DISCONTINUED | OUTPATIENT
Start: 2024-01-10 | End: 2024-01-10

## 2024-01-10 RX ORDER — MORPHINE SULFATE 4 MG/ML
4 INJECTION INTRAVENOUS EVERY 5 MIN PRN
Status: DISCONTINUED | OUTPATIENT
Start: 2024-01-10 | End: 2024-01-10 | Stop reason: HOSPADM

## 2024-01-10 RX ORDER — DEXAMETHASONE SODIUM PHOSPHATE 4 MG/ML
INJECTION, SOLUTION INTRA-ARTICULAR; INTRALESIONAL; INTRAMUSCULAR; INTRAVENOUS; SOFT TISSUE AS NEEDED
Status: DISCONTINUED | OUTPATIENT
Start: 2024-01-10 | End: 2024-01-10

## 2024-01-10 RX ORDER — FENTANYL CITRATE 50 UG/ML
INJECTION, SOLUTION INTRAMUSCULAR; INTRAVENOUS AS NEEDED
Status: DISCONTINUED | OUTPATIENT
Start: 2024-01-10 | End: 2024-01-10

## 2024-01-10 RX ORDER — ONDANSETRON HYDROCHLORIDE 2 MG/ML
4 INJECTION, SOLUTION INTRAVENOUS ONCE AS NEEDED
Status: DISCONTINUED | OUTPATIENT
Start: 2024-01-10 | End: 2024-01-10 | Stop reason: HOSPADM

## 2024-01-10 RX ORDER — MIDAZOLAM HYDROCHLORIDE 1 MG/ML
INJECTION, SOLUTION INTRAMUSCULAR; INTRAVENOUS AS NEEDED
Status: DISCONTINUED | OUTPATIENT
Start: 2024-01-10 | End: 2024-01-10

## 2024-01-10 RX ORDER — SODIUM CHLORIDE 0.9 G/100ML
IRRIGANT IRRIGATION AS NEEDED
Status: DISCONTINUED | OUTPATIENT
Start: 2024-01-10 | End: 2024-01-10 | Stop reason: HOSPADM

## 2024-01-10 RX ORDER — ONDANSETRON HYDROCHLORIDE 2 MG/ML
INJECTION, SOLUTION INTRAVENOUS AS NEEDED
Status: DISCONTINUED | OUTPATIENT
Start: 2024-01-10 | End: 2024-01-10

## 2024-01-10 RX ADMIN — CEFAZOLIN SODIUM 2 G: 2 INJECTION, SOLUTION INTRAVENOUS at 11:28

## 2024-01-10 RX ADMIN — FENTANYL CITRATE 50 MCG: 50 INJECTION, SOLUTION INTRAMUSCULAR; INTRAVENOUS at 11:21

## 2024-01-10 RX ADMIN — Medication 200 MCG: at 12:23

## 2024-01-10 RX ADMIN — ACETAMINOPHEN 650 MG: 325 TABLET ORAL at 14:38

## 2024-01-10 RX ADMIN — Medication 200 MCG: at 12:20

## 2024-01-10 RX ADMIN — FENTANYL CITRATE 50 MCG: 50 INJECTION, SOLUTION INTRAMUSCULAR; INTRAVENOUS at 12:28

## 2024-01-10 RX ADMIN — Medication 100 MCG: at 12:11

## 2024-01-10 RX ADMIN — Medication 100 MCG: at 12:42

## 2024-01-10 RX ADMIN — Medication 100 MCG: at 12:13

## 2024-01-10 RX ADMIN — PROPOFOL 200 MG: 10 INJECTION, EMULSION INTRAVENOUS at 11:21

## 2024-01-10 RX ADMIN — ONDANSETRON 4 MG: 2 INJECTION INTRAMUSCULAR; INTRAVENOUS at 12:44

## 2024-01-10 RX ADMIN — Medication 100 MCG: at 12:39

## 2024-01-10 RX ADMIN — SUGAMMADEX 200 MG: 100 INJECTION, SOLUTION INTRAVENOUS at 13:20

## 2024-01-10 RX ADMIN — MIDAZOLAM 2 MG: 1 INJECTION INTRAMUSCULAR; INTRAVENOUS at 11:18

## 2024-01-10 RX ADMIN — DEXAMETHASONE SODIUM PHOSPHATE 4 MG: 4 INJECTION, SOLUTION INTRAMUSCULAR; INTRAVENOUS at 11:30

## 2024-01-10 RX ADMIN — LIDOCAINE HYDROCHLORIDE 100 MG: 20 INJECTION, SOLUTION INFILTRATION; PERINEURAL at 11:21

## 2024-01-10 RX ADMIN — FENTANYL CITRATE 50 MCG: 50 INJECTION, SOLUTION INTRAMUSCULAR; INTRAVENOUS at 13:05

## 2024-01-10 RX ADMIN — ROCURONIUM BROMIDE 50 MG: 10 INJECTION, SOLUTION INTRAVENOUS at 11:22

## 2024-01-10 RX ADMIN — SODIUM CHLORIDE, SODIUM LACTATE, POTASSIUM CHLORIDE, AND CALCIUM CHLORIDE: 600; 310; 30; 20 INJECTION, SOLUTION INTRAVENOUS at 12:35

## 2024-01-10 RX ADMIN — SODIUM CHLORIDE, SODIUM LACTATE, POTASSIUM CHLORIDE, AND CALCIUM CHLORIDE: 600; 310; 30; 20 INJECTION, SOLUTION INTRAVENOUS at 11:17

## 2024-01-10 RX ADMIN — FENTANYL CITRATE 50 MCG: 50 INJECTION, SOLUTION INTRAMUSCULAR; INTRAVENOUS at 11:17

## 2024-01-10 RX ADMIN — Medication 200 MCG: at 12:15

## 2024-01-10 SDOH — HEALTH STABILITY: MENTAL HEALTH: CURRENT SMOKER: 0

## 2024-01-10 ASSESSMENT — PAIN SCALES - GENERAL
PAINLEVEL_OUTOF10: 0 - NO PAIN
PAINLEVEL_OUTOF10: 3
PAINLEVEL_OUTOF10: 0 - NO PAIN
PAIN_LEVEL: 0
PAINLEVEL_OUTOF10: 0 - NO PAIN

## 2024-01-10 ASSESSMENT — COLUMBIA-SUICIDE SEVERITY RATING SCALE - C-SSRS
2. HAVE YOU ACTUALLY HAD ANY THOUGHTS OF KILLING YOURSELF?: NO
6. HAVE YOU EVER DONE ANYTHING, STARTED TO DO ANYTHING, OR PREPARED TO DO ANYTHING TO END YOUR LIFE?: NO
1. IN THE PAST MONTH, HAVE YOU WISHED YOU WERE DEAD OR WISHED YOU COULD GO TO SLEEP AND NOT WAKE UP?: NO

## 2024-01-10 ASSESSMENT — PAIN - FUNCTIONAL ASSESSMENT
PAIN_FUNCTIONAL_ASSESSMENT: 0-10

## 2024-01-10 NOTE — ANESTHESIA POSTPROCEDURE EVALUATION
Patient: Logan Campos    Procedure Summary       Date: 01/10/24 Room / Location: [facility] 04 / [facility]    Anesthesia Start: 1116 Anesthesia Stop: 1332    Procedure: SPINAL CORD STIMULATOR LEADS & GENERATOR REVISION WITH C-ARM (Bilateral) Diagnosis:       Chronic pain syndrome      (Chronic pain syndrome [G89.4])    Surgeons: Maira Stuart MD Responsible Provider: Vonnie Byrne MD    Anesthesia Type: MAC ASA Status: 3            Anesthesia Type: MAC    Vitals Value Taken Time   /75 01/10/24 1330   Temp 36.5 01/10/24 1332   Pulse 95 01/10/24 1331   Resp 12 01/10/24 1332   SpO2 96 % 01/10/24 1331   Vitals shown include unvalidated device data.    Anesthesia Post Evaluation    Patient location during evaluation: PACU  Patient participation: complete - patient participated  Level of consciousness: awake and alert  Pain score: 0  Pain management: adequate  Airway patency: patent  Cardiovascular status: hemodynamically stable and acceptable  Respiratory status: acceptable and face mask  Hydration status: acceptable  Postoperative Nausea and Vomiting: none        There were no known notable events for this encounter.

## 2024-01-10 NOTE — ANESTHESIA PREPROCEDURE EVALUATION
Patient: Logan Campos    Procedure Information       Date/Time: 01/10/24 1130    Procedure: SPINAL CORD STIMULATOR LEADS & GENERATOR REVISION WITH C-ARM (Bilateral) - This is a revision for spinal cord stimulator leads and generator requires C arm table with C arm available in the room    Location: [facility] 04 / [facility]    Surgeons: Maira Stuart MD            Relevant Problems   Anesthesia (within normal limits)      Cardiovascular   (+) Angina pectoris (CMS/HCC)   (+) CAD (coronary artery disease)   (+) Essential hypertension   (+) Mixed hyperlipidemia   (+) Presence of stent in coronary artery in patient with coronary artery disease   (+) Prinzmetal variant angina (CMS/HCC)      Endocrine   (+) Type 2 diabetes mellitus with hyperglycemia, without long-term current use of insulin (CMS/HCC)      GI   (+) Gastroesophageal reflux disease      /Renal   (+) Renal cyst      Neuro/Psych   (+) Inflammatory neuropathy (CMS/HCC)   (+) Lumbosacral radiculopathy      Pulmonary   (+) Asthma   (+) Mild persistent asthma without complication      Musculoskeletal   (+) Chronic bilateral low back pain with left-sided sciatica   (+) Chronic pain syndrome   (+) Complex regional pain syndrome type I   (+) Lumbosacral spondylosis without myelopathy   (+) Primary osteoarthritis of left knee   (+) Spinal stenosis of lumbar region without neurogenic claudication      Eyes, Ears, Nose, and Throat   (+) Bilateral sensorineural hearing loss       Clinical information reviewed:    Allergies  Meds               NPO Detail:  NPO/Void Status  Date of Last Liquid: 01/10/24  Time of Last Liquid: 0530  Date of Last Solid: 01/09/24  Time of Last Solid: 2200         Physical Exam    Airway  Mallampati: III  TM distance: >3 FB  Neck ROM: full     Cardiovascular - normal exam     Dental - normal exam     Pulmonary - normal exam     Abdominal - normal exam             Anesthesia Plan    History of general anesthesia?: yes  History of  complications of general anesthesia?: no    ASA 3     general   (GETA)  The patient is not a current smoker.    intravenous induction   Postoperative administration of opioids is intended.  Anesthetic plan and risks discussed with patient.    Plan discussed with CRNA and CAA.

## 2024-01-10 NOTE — OP NOTE
SPINAL CORD STIMULATOR LEADS & GENERATOR REVISION WITH C-ARM (B) Operative Note     Date: 1/10/2024  OR Location: [facility]    Name: Logan Campos, : 1965, Age: 58 y.o., MRN: [id number], Sex: male    Diagnosis  Pre-op Diagnosis     * Chronic pain syndrome [G89.4] Post-op Diagnosis     * Chronic pain syndrome [G89.4]     Procedures  SPINAL CORD STIMULATOR LEADS & GENERATOR REVISION WITH C-ARM  70826 - ME REVJ/RMVL IMPL SPI NPG/RCVR DTCH CONNJ ELALICIA RA      Surgeons      * Maira Stuart - Primary    Resident/Fellow/Other Assistant:  Surgeon(s) and Role:    Procedure Summary  Anesthesia: Monitor Anesthesia Care  ASA: III  Anesthesia Staff: Anesthesiologist: Vonnie Byrne MD  CRNA: ELIDA Anna DNP  SRNA: ELIDA Lockwood  Estimated Blood Loss: 10mL  Intra-op Medications:   Medication Name Total Dose   lidocaine-epinephrine PF (Xylocaine W/EPI) 1 %-1:200,000 injection 22 mL   sodium chloride 0.9% flush 2 mL   sodium chloride 0.9 % irrigation solution 1,000 mL              Anesthesia Record               Intraprocedure I/O Totals          Intake    LR infusion 1500.00 mL    Total Intake 1500 mL          Specimen: No specimens collected     Staff:   Circulator: Jasper Valentino RN  Relief Circulator: Chely Hansen RN  Relief Scrub: Smith Dale RN  Scrub Person: Sneha Saunders; Camelia Lange; Tess Mullins         Drains and/or Catheters: * None in log *    Tourniquet Times:         Implants:  Implants       Type Name Action Serial No.      Neuro Interventional Implant ENVELOPE, NEURO, ABSORBABLE, MED - [id number] Implanted      Neuro Interventional Implant KIT, MRI PERCUTANEOUS, INTELLIS SENSOR - [id number] Implanted               Findings: Both Medtronic old leads were removed around T8-10 level the same thing with the IPG with the right lower gluteal area.  Bilateral new leads were inserted from the same incision and the IPG was implanted in the right flank.    Indications: Logan Campos is  an 58 y.o. male who is having surgery for Chronic pain syndrome [G89.4].     The patient was seen in the preoperative area. The risks, benefits, complications, treatment options, non-operative alternatives, expected recovery and outcomes were discussed with the patient. The possibilities of reaction to medication, pulmonary aspiration, injury to surrounding structures, bleeding, recurrent infection, the need for additional procedures, failure to diagnose a condition, and creating a complication requiring transfusion or operation were discussed with the patient. The patient concurred with the proposed plan, giving informed consent.  The site of surgery was properly noted/marked if necessary per policy. The patient has been actively warmed in preoperative area. Preoperative antibiotics have been ordered and given within 1 hours of incision. Venous thrombosis prophylaxis have been ordered including bilateral sequential compression devices    Procedure Details: Pre-op Antibiotics: Ancef 2 g IV    Spinal cord stimulator type: Medtronic spinal cord stimulator    CLINICAL FINDINGS: This is a well-known patient to the [facility] who had significant chronic pain of chest wall from coronary artery spasm that was treated with spinal cord stimulator more than 15 years ago for Prinzmetal syndrome and the patient did very well.  The battery is getting depleted and the leads are not MRI compatible as a result we proceeded for the revision of both leads and IPG.    SURGICAL PROCEDURES: After sufficient consent was signed, the patient was brought to the operating room, general endotracheal intubation was performed by anesthesiologist, the patient placed in the prone position using the Damion table and Logan frame. Under fluoroscopy guidance, the previous insertion leads level was identified at T8-9, the previous incision was infiltrated with lidocaine 1% with epi 1 and 100,000.  The incision was opened dissection was  carried down until both lead were identified including the connector.  Both leads were removed from the epidural space and cut medially.  A size 14 gauge Tuohy needle was inserted 1-1/2 vertebrae and lateral to the midline after infiltrating the skin with lidocaine 1% Epi 1/100.000 using a size 25 gauge needle.  The T7 T1 level was entered.  At that time the first lead was inserted gradually until the tip of the lead was placed at the C7 level.  Intraoperative programming resulted in excellent coverage of the affected area.  A 2nd  lead from the left side of the left was inserted and advanced to the left of the previous lead with the tip at T1-2 level.  Intraoperative programming resulted in excellent coverage.  Next the skin was infiltrated with lidocaine 1% with epi  just in the midline, then an incision around 2 inches in length was performed.  Dissection was carried down until the deep fascia was encountered over the spinous processes. Dissection was carried laterally until the previous needles was identified.   At that time both needles were removed and both leads passed to the midline incision and were secured to the deep fascia of the midline incision usinng 00 silk sutures and with Click anchor.  A 1.5 inch incision was performed 2 fingers caudal to the iliac crest. Then a  pocket of 7 cm was made in the right gluteal area after local infiltration with lidocaine.  Both leads were tunneled and passed from the midline to the lateral incision.  Both leads were connected to the spinal cord stimulator generator.  Intraoperative impedance study showed excellent impedance coverage for the leads.  At that time the generator was secured to the deep fascia using 2-0 Ethibond sutures.  All incisions were irrigated with antibiotic solutions.  Then the subcutaneous tissues were closed using 2-0 Vicryl  sutures.  The skin was closed using subcuticular Monocryl.  Dermabond was placed on both incisions. The patient was  transferred to the recovery room in stable condition. Postoperative programming resulted in excellent coverage.  The patient discharged home and instructed on using his antibiotics and pain medication and to avoid any major twisting and bending to protect the lead integrity.  The patient is to follow up with the [facility] in 10 days from now from now.         Complications:  None; patient tolerated the procedure well.    Disposition: PACU - hemodynamically stable.  Condition: stable           Attending Attestation: I performed the procedure.    Maira Stuart  Phone Number: [phone]

## 2024-01-10 NOTE — OP NOTE
SPINAL CORD STIMULATOR LEADS & GENERATOR REVISION WITH C-ARM (B) Operative Note     Date: 1/10/2024  OR Location: [facility]    Name: Logan Campos, : 1965, Age: 58 y.o., MRN: [id number], Sex: male    Diagnosis  Pre-op Diagnosis     * Chronic pain syndrome [G89.4] Post-op Diagnosis     * Chronic pain syndrome [G89.4]     Procedures  SPINAL CORD STIMULATOR LEADS & GENERATOR REVISION WITH C-ARM  73286 - [facility] REVJ/RMVL IMPL SPI NPG/RCVR DTCH CONNJ ELTRSALLY RA      Surgeons      * Maira Stuart - Primary    Resident/Fellow/Other Assistant:  Surgeon(s) and Role:    Procedure Summary  Anesthesia: Monitor Anesthesia Care  ASA: III  Anesthesia Staff: Anesthesiologist: Vonnie Byrne MD  CRNA: ELIDA Anna DNP  SRNA: ELIDA Lockwood  Estimated Blood Loss: 10mL  Intra-op Medications:   Medication Name Total Dose   lidocaine-epinephrine PF (Xylocaine W/EPI) 1 %-1:200,000 injection 22 mL   sodium chloride 0.9% flush 2 mL   sodium chloride 0.9 % irrigation solution 1,000 mL   ceFAZolin in dextrose (iso-os) (Ancef) IVPB 2 g 2 g              Anesthesia Record               Intraprocedure I/O Totals          Intake    LR infusion 1000.00 mL    Total Intake 1000 mL          Specimen: No specimens collected     Staff:   Circulator: Jasper Valentino RN  Relief Circulator: Chely Hansen RN  Relief Scrub: Smith Dale RN  Scrub Person: Sneha Saunders; Camelia Lange; Tess Mullins         Drains and/or Catheters: * None in log *    Tourniquet Times:         Implants:  Implants       Type Name Action Serial No.      Spinal Hardware LEAD, PERMANENT, 75CM, SPINE STIM SURE-SCAN VECTRIS - [id number] - [id number] Implanted [id number]     Spinal Hardware LEAD, PERMANENT, 75CM, SPINE STIM SURE-SCAN VECTRIS - [id number] - [id number] Implanted [id number]     Spinal Hardware NEUROSTIMULATOR, RS2 MRI - [id number] - [id number] Implanted [id number]     Neuro Interventional Implant ENVELOPE, NEURO, ABSORBABLE, MED - [id number]  Implanted               Findings: The previous lead was removed from the same midline incision the same thing with the old IPG from the right gluteal area and the new leads were implanted at the same incision and lower thoracic while the IPG was implanted in the right flank area pocket    Indications: Logan Campos is an 58 y.o. male who is having surgery for Chronic pain syndrome [G89.4].  Prinzmetal syndrome with coronary artery spasm    The patient was seen in the preoperative area. The risks, benefits, complications, treatment options, non-operative alternatives, expected recovery and outcomes were discussed with the patient. The possibilities of reaction to medication, pulmonary aspiration, injury to surrounding structures, bleeding, recurrent infection, the need for additional procedures, failure to diagnose a condition, and creating a complication requiring transfusion or operation were discussed with the patient. The patient concurred with the proposed plan, giving informed consent.  The site of surgery was properly noted/marked if necessary per policy. The patient has been actively warmed in preoperative area. Preoperative antibiotics have been ordered and given within 1 hours of incision. Venous thrombosis prophylaxis have been ordered including bilateral sequential compression devices    Procedure Details: Removal of depleted old Medtronic spinal cord stimulator and replacement with MRI compatible leads and IPG Medtronics  Complications:  None; patient tolerated the procedure well.    Disposition: PACU - hemodynamically stable.  Condition: stable         Additional Details:pre-op Antibiotics: Ancef 2 g    Spinal cord stimulator type: Medtronics MRI compatible spinal cord stimulator    CLINICAL FINDINGS: This is a well-known patient to the [facility] who had significant chest wall pain from Prinzmetal syndrome and coronary artery spasm that responded very well to spinal cord stimulator 15 to 20  years ago.  The IPG life expectancy reached its and and we decided to proceed with revision of the IPG in addition to spinal cord stimulator lead to have MRI compatible system.    SURGICAL PROCEDURES: After sufficient consent was signed, the patient was brought to the operating room, placed in the prone position with a pillow underneath his hips.  Anesthesia was started.  Under fluoroscopy guidance, at the lower thoracic spine the previous leads were identified the previous incision was infiltrated with lidocaine 1% with epi.  Dissection was carried down until the previous connector where identified in addition to the leads.  Careful dissection using electrocautery was achieved until both fixators were removed the lead was cut at that level.  A size 14 gauge Tuohy needle was inserted until the interspace of the 8 9 was identified loss-of-resistance was achieved the first lead was advanced until the tip of the lead was placed at C7.  Lateral x-ray showed posterior position of the lead.  The next lead was entered using the same interspace on the left side and the lead was advised to than left of the previous lead and placed at T1 level.  Next the skin over the new pocket was infiltrated with lidocaine 1% with epi  just in the midline, then an incision around 2 inches in length was performed.  Dissection was carried down until the deep fascia was encountered over the spinous processes. Dissection was carried laterally until the previous needles was identified.   At that time both needles were removed and both leads passed to the midline incision and were secured to the deep fascia of the midline incision usinng 00 silk sutures and with Click anchor.  A 1.5 inch incision was performed 2 fingers caudal to the iliac crest. Then a  pocket of 7 cm was made in the right gluteal area after local infiltration with lidocaine.  Both leads were tunneled and passed from the midline to the lateral incision.  Both leads were  connected to the spinal cord stimulator generator.  Intraoperative impedance study showed excellent impedance coverage for the leads.  At that time the generator was secured to the deep fascia using 2-0 Ethibond sutures and the "[employer]tronic antibiotic sheet and part of the sheath was placed over the leads and the midline incision..  All incisions were irrigated with antibiotic solutions.  Then the subcutaneous tissues were closed using 2-0 Vicryl  sutures.  The skin was closed using subcuticular Monocryl.  Dermabond was placed on both incisions. The patient was transferred to the recovery room in stable condition. Postoperative programming resulted in excellent coverage.  The patient discharged home and instructed on using his antibiotics and pain medication and to avoid any major twisting and bending to protect the lead integrity.  The patient is to follow up with the [facility] in 10 days from now from now.         Attending Attestation: I performed the procedure.    Maira Stuart  Phone Number: [phone]

## 2024-01-10 NOTE — ANESTHESIA PROCEDURE NOTES
Airway  Date/Time: 1/10/2024 11:24 AM  Urgency: elective    Airway not difficult    Staffing  Performed: SRNA   Authorized by: Vonnie Byrne MD    Performed by: ELIDA Lockwood  Patient location during procedure: OR    Indications and Patient Condition  Indications for airway management: anesthesia and airway protection  Spontaneous ventilation: present  Sedation level: deep  Preoxygenated: yes  Patient position: sniffing  MILS maintained throughout  Mask difficulty assessment: 2 - vent by mask + OA or adjuvant +/- NMBA  Planned trial extubation    Final Airway Details  Final airway type: endotracheal airway      Successful airway: ETT  Cuffed: yes   Successful intubation technique: direct laryngoscopy  Facilitating devices/methods: intubating stylet  Endotracheal tube insertion site: oral  Blade: Livier  Blade size: #4  ETT size (mm): 7.5  Cormack-Lehane Classification: grade IIa - partial view of glottis  Placement verified by: capnometry and palpation of cuff   Measured from: lips  ETT to lips (cm): 22  Number of attempts at approach: 1  Ventilation between attempts: none  Number of other approaches attempted: 0

## 2024-01-11 ASSESSMENT — PAIN SCALES - GENERAL: PAINLEVEL_OUTOF10: 0 - NO PAIN

## 2024-01-12 ENCOUNTER — APPOINTMENT (OUTPATIENT)
Dept: UROLOGY | Facility: CLINIC | Age: 59
End: 2024-01-12
Payer: COMMERCIAL

## 2024-01-16 DIAGNOSIS — E78.2 MIXED HYPERLIPIDEMIA: ICD-10-CM

## 2024-01-16 RX ORDER — CLOPIDOGREL BISULFATE 75 MG/1
75 TABLET ORAL DAILY
Qty: 90 TABLET | Refills: 3 | Status: SHIPPED | OUTPATIENT
Start: 2024-01-16

## 2024-01-16 NOTE — PROGRESS NOTES
Patient has had his revision of Medtronic's spinal cord stimulator on 1/10/2024 with Medtronic spinal cord stimulator at staggered C7-T1

## 2024-01-17 LAB
ATRIAL RATE: 73 BPM
P AXIS: 40 DEGREES
P OFFSET: 197 MS
P ONSET: 144 MS
PR INTERVAL: 152 MS
Q ONSET: 220 MS
QRS COUNT: 12 BEATS
QRS DURATION: 78 MS
QT INTERVAL: 372 MS
QTC CALCULATION(BAZETT): 409 MS
QTC FREDERICIA: 397 MS
R AXIS: 40 DEGREES
T AXIS: 64 DEGREES
T OFFSET: 406 MS
VENTRICULAR RATE: 73 BPM

## 2024-01-18 ENCOUNTER — INFUSION (OUTPATIENT)
Dept: INFUSION THERAPY | Facility: CLINIC | Age: 59
End: 2024-01-18
Payer: COMMERCIAL

## 2024-01-18 VITALS
RESPIRATION RATE: 20 BRPM | SYSTOLIC BLOOD PRESSURE: 118 MMHG | DIASTOLIC BLOOD PRESSURE: 73 MMHG | OXYGEN SATURATION: 94 % | HEART RATE: 82 BPM | TEMPERATURE: 97.5 F

## 2024-01-18 DIAGNOSIS — M54.42 CHRONIC BILATERAL LOW BACK PAIN WITH LEFT-SIDED SCIATICA: ICD-10-CM

## 2024-01-18 DIAGNOSIS — G89.0 CENTRAL PAIN SYNDROME: Primary | ICD-10-CM

## 2024-01-18 DIAGNOSIS — Z96.89 SPINAL CORD STIMULATOR STATUS: ICD-10-CM

## 2024-01-18 DIAGNOSIS — G89.29 CHRONIC BILATERAL LOW BACK PAIN WITH LEFT-SIDED SCIATICA: ICD-10-CM

## 2024-01-18 PROCEDURE — 2500000004 HC RX 250 GENERAL PHARMACY W/ HCPCS (ALT 636 FOR OP/ED)

## 2024-01-18 PROCEDURE — 96365 THER/PROPH/DIAG IV INF INIT: CPT | Mod: INF

## 2024-01-18 PROCEDURE — 2500000005 HC RX 250 GENERAL PHARMACY W/O HCPCS: Performed by: NURSE PRACTITIONER

## 2024-01-18 PROCEDURE — 96368 THER/DIAG CONCURRENT INF: CPT | Mod: INF

## 2024-01-18 PROCEDURE — 2500000004 HC RX 250 GENERAL PHARMACY W/ HCPCS (ALT 636 FOR OP/ED): Performed by: NURSE PRACTITIONER

## 2024-01-18 PROCEDURE — 96375 TX/PRO/DX INJ NEW DRUG ADDON: CPT | Mod: INF

## 2024-01-18 RX ORDER — KETOROLAC TROMETHAMINE 30 MG/ML
30 INJECTION, SOLUTION INTRAMUSCULAR; INTRAVENOUS ONCE
Status: COMPLETED | OUTPATIENT
Start: 2024-01-18 | End: 2024-01-18

## 2024-01-18 RX ORDER — DIPHENHYDRAMINE HYDROCHLORIDE 50 MG/ML
50 INJECTION INTRAMUSCULAR; INTRAVENOUS AS NEEDED
Status: CANCELLED | OUTPATIENT
Start: 2024-01-25

## 2024-01-18 RX ORDER — KETOROLAC TROMETHAMINE 30 MG/ML
INJECTION, SOLUTION INTRAMUSCULAR; INTRAVENOUS
Status: COMPLETED
Start: 2024-01-18 | End: 2024-01-18

## 2024-01-18 RX ORDER — FAMOTIDINE 10 MG/ML
20 INJECTION INTRAVENOUS ONCE AS NEEDED
Status: CANCELLED | OUTPATIENT
Start: 2024-01-25

## 2024-01-18 RX ORDER — KETOROLAC TROMETHAMINE 30 MG/ML
30 INJECTION, SOLUTION INTRAMUSCULAR; INTRAVENOUS ONCE
Status: CANCELLED | OUTPATIENT
Start: 2024-01-25 | End: 2024-01-25

## 2024-01-18 RX ORDER — ALBUTEROL SULFATE 0.83 MG/ML
3 SOLUTION RESPIRATORY (INHALATION) AS NEEDED
Status: CANCELLED | OUTPATIENT
Start: 2024-01-25

## 2024-01-18 RX ORDER — EPINEPHRINE 0.3 MG/.3ML
0.3 INJECTION SUBCUTANEOUS EVERY 5 MIN PRN
Status: CANCELLED | OUTPATIENT
Start: 2024-01-25

## 2024-01-18 RX ORDER — METOPROLOL TARTRATE 25 MG/1
25 TABLET, FILM COATED ORAL ONCE
Status: CANCELLED | OUTPATIENT
Start: 2024-01-25 | End: 2024-01-25

## 2024-01-18 RX ORDER — DIPHENHYDRAMINE HYDROCHLORIDE 50 MG/ML
25 INJECTION INTRAMUSCULAR; INTRAVENOUS ONCE
Status: CANCELLED | OUTPATIENT
Start: 2024-01-25 | End: 2024-01-25

## 2024-01-18 RX ORDER — PROMETHAZINE HYDROCHLORIDE 25 MG/ML
12.5 INJECTION, SOLUTION INTRAMUSCULAR; INTRAVENOUS ONCE
Status: CANCELLED | OUTPATIENT
Start: 2024-01-25 | End: 2024-01-25

## 2024-01-18 RX ORDER — NITROGLYCERIN 0.4 MG/1
0.4 TABLET SUBLINGUAL ONCE
Status: CANCELLED | OUTPATIENT
Start: 2024-01-25 | End: 2024-01-25

## 2024-01-18 RX ORDER — ONDANSETRON HYDROCHLORIDE 2 MG/ML
4 INJECTION, SOLUTION INTRAVENOUS ONCE
Status: CANCELLED | OUTPATIENT
Start: 2024-01-25 | End: 2024-01-25

## 2024-01-18 RX ORDER — METOCLOPRAMIDE HYDROCHLORIDE 5 MG/ML
10 INJECTION INTRAMUSCULAR; INTRAVENOUS ONCE
Status: CANCELLED | OUTPATIENT
Start: 2024-01-25 | End: 2024-01-25

## 2024-01-18 RX ADMIN — PROPOFOL 100 MG: 10 INJECTION, EMULSION INTRAVENOUS at 09:24

## 2024-01-18 RX ADMIN — KETOROLAC TROMETHAMINE 30 MG: 30 INJECTION, SOLUTION INTRAMUSCULAR; INTRAVENOUS at 09:23

## 2024-01-18 RX ADMIN — Medication: at 09:24

## 2024-01-18 ASSESSMENT — PAIN SCALES - GENERAL
PAINLEVEL_OUTOF10: 8
PAINLEVEL_OUTOF10: 2

## 2024-01-18 ASSESSMENT — ENCOUNTER SYMPTOMS
DEPRESSION: 1
LOSS OF SENSATION IN FEET: 1
OCCASIONAL FEELINGS OF UNSTEADINESS: 1

## 2024-01-18 NOTE — PROGRESS NOTES
1006-Post infusion teaching provided via handout and verbal instruction. Patient verbalized understanding. VSS, Patient states pain is 2. Will assist patient to waiting car via wheelchair.

## 2024-01-18 NOTE — PROGRESS NOTES
S: Patient here for 15 opioid sparing pain infusion. Patient reports 80% reduction in pain after last infusion that lasted 18 days.    Purpose of pain infusion meds explained along with potential side effects.  Patient verbalized understanding.    B: Pain Issues    A: Patient currently has pain described on flow sheet documentation. Designated  is Paulo. Patient last ate solid food >10 hours ago, and had liquid >6 hours ago.    R: Plan; Obtain IV access, do patient risk assessment, and start opioid sparing infusion as ordered. Monitoring for S/S of adverse reactions.

## 2024-01-19 ENCOUNTER — APPOINTMENT (OUTPATIENT)
Dept: PAIN MEDICINE | Facility: CLINIC | Age: 59
End: 2024-01-19
Payer: COMMERCIAL

## 2024-01-22 NOTE — PROGRESS NOTES
SUBJECTIVE:  This is 58 y.o.  male with PMH of obesity BMI 38, Prinzmetal syndrome treated with spinal cord stimulator with heart attacks had multiple stents on Plavix and Cymbalta 60 mg, pregabalin 50 mg 3 times daily and on medical marijuana with history of chronic lower back pain treated with IV infusion therapy and acupuncture in addition to previous physical therapy and he gets IV infusion therapy and ketamine/lidocaine nasal spray who had total revision of his Medtronic spinal cord stimulation leads and IPG on 1/10/2024 now MRI compatible who is here for follow-up stating the stimulation is covering exactly the area over his sternum that he needs it.  The incisions are healed properly there is no signs of irritation or edema or collections.  [employer] rep tested the device today and everything is in order and paper able to recharge it.      Prior office visit:  2023: This is 57 year year old male recall past medical history of obesity BMI 38, Prinzmetal syndrome treated with spinal cord stimulator with heart attacks had multiple stents on Plavix and Cymbalta 60 mg, pregabalin 50 mg 3 times daily and on medical marijuana with history of chronic lower back pain treated with IV infusion therapy and acupuncture in addition to previous physical therapy and he gets IV infusion therapy and ketamine/lidocaine nasal spray who is here for follow-up stating that the IV infusion therapy helps him significantly with his pain by 80% with last for at least 2 weeks and he uses ketamine/lidocaine nasal spray for the relief after. The patient could not have an MRI of his spine because of old spinal cord stimulator from [employer] was inserted for his CRPS of his chest wall after his heart attack. We will plan on revision of the spinal cord stimulator leads and IPG since they are ready to  anyway. We will continue with the IV infusion therapy once every 2 weeks as well.          Last procedure:   1/10/2024  Medtronic spinal cord stimulator leads and IPG revision with MRI compatible with the leads staggered at C7-T3  Few IV infusion therapy the patient has had a 80% improvement in pain and function     Portions of record reviewed for pertinent issues: active problem list, medication list, allergies, family history, social history, notes from last encounter, encounters, lab results, imaging and other system records.     I have personally reviewed the [facility] report for this patient. This report is scanned into the electronic medical record. I have considered the risks of abuse, dependence, addiction and diversion. 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 Patient uses medical marijuana for his pain                 Diagnostic studies:  3/21/2022 [facility] CT myelogram of the lumbar spine did not show any canal stenosis mild foraminal stenosis with spondylosis:  LUMBAR SPINE:                                                                                                                   There is minimal levoconvex curvature of the lumbar spine. Alignment                                                             is otherwise within normal limits. Vertebral height is well                                                       maintained. No acute fractures are seen. Very mild degenerative                                                       changes are seen at most levels of the lumbar spine with dominant                                                   findings as  below:                                                                                                                  T12-L1: No significant central canal or neural foraminal narrowing                                                                                                              L1-L2: A broad-based disc bulge is seen with mild central canal                                                         narrowing, minimal neural foraminal narrowing.                                                                                                               L2-L3: No significant central canal or neural foraminal narrowing.                                                                                                                 Computed Tomography                                                                                                                                                                               Report                                                            L3-L4: No significant central canal and neural foraminal narrowing.                                                                                                             L4-L5: No significant central canal narrowing. Mild facet arthropathy                                                  with mild right greater than left neural foraminal narrowing.                                                                                                           L5-S1: Disc osteophyte complex with minimal central canal narrowing                                                             and mild bilateral neural foraminal narrowing.                                                                                                                  Conus terminates at L1-L2. No intrathecal masses identified.                                                             No significant abnormality seen within the paravertebral soft  tissues.                                                                                                          COMBINED IMPRESSION:                                                                                                                 Mild degenerative changes of the lumbar spine, worst at the L1-L2                                                    level with broad-based disc bulge demonstrating mild central canal                                                    narrowing. Mild neural foraminal narrowing is seen at the lower lumbar                                                            levels.                                                                                                                                                                               Report Dictated on Workstation: [id number]                                                                  Review of Systems   HENT: Negative.     Eyes: Negative.    Respiratory: Negative.     Cardiovascular: Negative.    Gastrointestinal: Negative.    Endocrine: Negative.    Genitourinary: Negative.    Musculoskeletal:         Chest wall pain controlled with Medtronic spinal cord stimulator   Skin: Negative.    Neurological: Negative.    Hematological: Negative.    Psychiatric/Behavioral: Negative.          Physical Exam  Vitals and nursing note reviewed.   Constitutional:       Appearance: Normal appearance.          Comments: Spinal cord stimulator sites well-healed   HENT:      Head: Normocephalic and atraumatic.      Nose: Nose normal.   Eyes:      Extraocular Movements: Extraocular movements intact.      Conjunctiva/sclera: Conjunctivae normal.      Pupils: Pupils are equal, round, and reactive to light.   Cardiovascular:      Rate and Rhythm: Normal rate and regular rhythm.      Pulses: Normal pulses.      Heart sounds: Normal heart sounds.   Pulmonary:      Effort: Pulmonary effort is normal.      Breath sounds: Normal  breath sounds.   Abdominal:      General: Abdomen is flat. Bowel sounds are normal.      Palpations: Abdomen is soft.   Skin:     General: Skin is warm.   Neurological:      Mental Status: He is alert.   Psychiatric:         Mood and Affect: Mood normal.         Behavior: Behavior normal.                      Plan  At least 50% of the visit was involved in the discussion of the options for treatment. We discussed exercises, medication, interventional therapies and surgery. Healthy life style is essential with patient hard work to achieve the wellness. In addition; discussion with the patient and/or family about any of the diagnostic results, impressions and/or recommended diagnostic studies, prognosis, risks and benefits of treatment options, instructions for treatment and/or follow-up, importance of compliance with chosen treatment options, risk-factor reduction, and patient/family education.         Pool therapy, walking in the pool, at least 3x per week for 30 minutes  Continue self-directed physical therapy  Continue the use of spinal cord stimulator  Healthy lifestyle and anti-inflammatory diet in addition to weight control discussed with the patient  Alternative chronic pain therapies was discussed, encouraged and information was handed  Return to Clinic 3 to 6 months     *Please note this report has been produced using speech recognition software and may contain errors related to that system including grammar, punctuation and spelling as well as words and phrases that may be inappropriate. If there are questions or concerns, please feel free to contact me to clarify.    Maira Stuart MD

## 2024-01-25 ENCOUNTER — OFFICE VISIT (OUTPATIENT)
Dept: PAIN MEDICINE | Facility: CLINIC | Age: 59
End: 2024-01-25
Payer: COMMERCIAL

## 2024-01-25 VITALS
WEIGHT: 246 LBS | TEMPERATURE: 97.3 F | HEIGHT: 68 IN | RESPIRATION RATE: 16 BRPM | SYSTOLIC BLOOD PRESSURE: 103 MMHG | BODY MASS INDEX: 37.28 KG/M2 | DIASTOLIC BLOOD PRESSURE: 77 MMHG | OXYGEN SATURATION: 95 % | HEART RATE: 82 BPM

## 2024-01-25 DIAGNOSIS — G90.59 COMPLEX REGIONAL PAIN SYNDROME TYPE 1 AFFECTING OTHER SITE: ICD-10-CM

## 2024-01-25 DIAGNOSIS — Z96.89 SPINAL CORD STIMULATOR STATUS: Primary | ICD-10-CM

## 2024-01-25 PROCEDURE — 4010F ACE/ARB THERAPY RXD/TAKEN: CPT | Performed by: ANESTHESIOLOGY

## 2024-01-25 PROCEDURE — 99214 OFFICE O/P EST MOD 30 MIN: CPT | Performed by: ANESTHESIOLOGY

## 2024-01-25 PROCEDURE — 3078F DIAST BP <80 MM HG: CPT | Performed by: ANESTHESIOLOGY

## 2024-01-25 PROCEDURE — 1036F TOBACCO NON-USER: CPT | Performed by: ANESTHESIOLOGY

## 2024-01-25 PROCEDURE — 3074F SYST BP LT 130 MM HG: CPT | Performed by: ANESTHESIOLOGY

## 2024-01-25 SDOH — ECONOMIC STABILITY: FOOD INSECURITY: WITHIN THE PAST 12 MONTHS, THE FOOD YOU BOUGHT JUST DIDN'T LAST AND YOU DIDN'T HAVE MONEY TO GET MORE.: NEVER TRUE

## 2024-01-25 SDOH — ECONOMIC STABILITY: FOOD INSECURITY: WITHIN THE PAST 12 MONTHS, YOU WORRIED THAT YOUR FOOD WOULD RUN OUT BEFORE YOU GOT MONEY TO BUY MORE.: NEVER TRUE

## 2024-01-25 ASSESSMENT — PAIN SCALES - GENERAL
PAINLEVEL: 4
PAINLEVEL_OUTOF10: 4

## 2024-01-25 ASSESSMENT — LIFESTYLE VARIABLES
HOW OFTEN DO YOU HAVE A DRINK CONTAINING ALCOHOL: NEVER
AUDIT-C TOTAL SCORE: 0
HAS A RELATIVE, FRIEND, DOCTOR, OR ANOTHER HEALTH PROFESSIONAL EXPRESSED CONCERN ABOUT YOUR DRINKING OR SUGGESTED YOU CUT DOWN: NO
HOW OFTEN DURING THE LAST YEAR HAVE YOU BEEN UNABLE TO REMEMBER WHAT HAPPENED THE NIGHT BEFORE BECAUSE YOU HAD BEEN DRINKING: NEVER
HOW OFTEN DO YOU HAVE SIX OR MORE DRINKS ON ONE OCCASION: NEVER
HAVE YOU OR SOMEONE ELSE BEEN INJURED AS A RESULT OF YOUR DRINKING: NO
TOTAL SCORE: 7
HOW OFTEN DURING THE LAST YEAR HAVE YOU FAILED TO DO WHAT WAS NORMALLY EXPECTED FROM YOU BECAUSE OF DRINKING: NEVER
SKIP TO QUESTIONS 9-10: 1
HOW OFTEN DURING THE LAST YEAR HAVE YOU NEEDED AN ALCOHOLIC DRINK FIRST THING IN THE MORNING TO GET YOURSELF GOING AFTER A NIGHT OF HEAVY DRINKING: NEVER
HOW MANY STANDARD DRINKS CONTAINING ALCOHOL DO YOU HAVE ON A TYPICAL DAY: PATIENT DOES NOT DRINK
HOW OFTEN DURING THE LAST YEAR HAVE YOU FOUND THAT YOU WERE NOT ABLE TO STOP DRINKING ONCE YOU HAD STARTED: NEVER
AUDIT TOTAL SCORE: 0
HOW OFTEN DURING THE LAST YEAR HAVE YOU HAD A FEELING OF GUILT OR REMORSE AFTER DRINKING: NEVER

## 2024-01-25 ASSESSMENT — ENCOUNTER SYMPTOMS
CARDIOVASCULAR NEGATIVE: 1
DEPRESSION: 0
PSYCHIATRIC NEGATIVE: 1
NEUROLOGICAL NEGATIVE: 1
GASTROINTESTINAL NEGATIVE: 1
RESPIRATORY NEGATIVE: 1
LOSS OF SENSATION IN FEET: 0
HEMATOLOGIC/LYMPHATIC NEGATIVE: 1
EYES NEGATIVE: 1
ENDOCRINE NEGATIVE: 1
OCCASIONAL FEELINGS OF UNSTEADINESS: 0

## 2024-01-25 ASSESSMENT — PATIENT HEALTH QUESTIONNAIRE - PHQ9
1. LITTLE INTEREST OR PLEASURE IN DOING THINGS: NOT AT ALL
SUM OF ALL RESPONSES TO PHQ9 QUESTIONS 1 AND 2: 0
2. FEELING DOWN, DEPRESSED OR HOPELESS: NOT AT ALL

## 2024-01-25 ASSESSMENT — PAIN - FUNCTIONAL ASSESSMENT: PAIN_FUNCTIONAL_ASSESSMENT: 0-10

## 2024-01-25 ASSESSMENT — PAIN DESCRIPTION - DESCRIPTORS: DESCRIPTORS: SORE

## 2024-01-25 NOTE — PROGRESS NOTES
This is 58 y.o.  male with who has been treated for  arterial spasms  . Pain is better, The pain is described as soreness and is relieved by stimulator . Here for follow-up   Chief Complaint   Patient presents with    Follow-up     Spinal cord revision 1/10/2024       Pain Therapies:  SCS    Opioid Risk Assessment Score 7/26

## 2024-01-30 PROCEDURE — RXMED WILLOW AMBULATORY MEDICATION CHARGE

## 2024-01-30 RX ORDER — TIRZEPATIDE 2.5 MG/.5ML
2.5 INJECTION, SOLUTION SUBCUTANEOUS
Qty: 2 ML | Refills: 0 | Status: SHIPPED | OUTPATIENT
Start: 2024-01-30 | End: 2024-02-28 | Stop reason: DRUGHIGH

## 2024-01-30 RX ORDER — DAPAGLIFLOZIN AND METFORMIN HYDROCHLORIDE 5; 1000 MG/1; MG/1
1 TABLET, FILM COATED, EXTENDED RELEASE ORAL
Qty: 90 TABLET | Refills: 3 | Status: SHIPPED | OUTPATIENT
Start: 2024-01-30

## 2024-01-31 ENCOUNTER — PHARMACY VISIT (OUTPATIENT)
Dept: PHARMACY | Facility: CLINIC | Age: 59
End: 2024-01-31
Payer: COMMERCIAL

## 2024-02-01 ENCOUNTER — APPOINTMENT (OUTPATIENT)
Dept: CARDIOLOGY | Facility: CLINIC | Age: 59
End: 2024-02-01
Payer: COMMERCIAL

## 2024-02-06 ENCOUNTER — OFFICE VISIT (OUTPATIENT)
Dept: UROLOGY | Facility: CLINIC | Age: 59
End: 2024-02-06
Payer: COMMERCIAL

## 2024-02-06 VITALS
HEART RATE: 67 BPM | WEIGHT: 251.32 LBS | BODY MASS INDEX: 38.09 KG/M2 | DIASTOLIC BLOOD PRESSURE: 76 MMHG | HEIGHT: 68 IN | SYSTOLIC BLOOD PRESSURE: 113 MMHG | RESPIRATION RATE: 16 BRPM

## 2024-02-06 DIAGNOSIS — N32.81 OAB (OVERACTIVE BLADDER): ICD-10-CM

## 2024-02-06 DIAGNOSIS — R35.0 FREQUENCY OF URINATION: Primary | ICD-10-CM

## 2024-02-06 DIAGNOSIS — N28.1 RENAL CYST: ICD-10-CM

## 2024-02-06 DIAGNOSIS — Z12.5 SCREENING PSA (PROSTATE SPECIFIC ANTIGEN): ICD-10-CM

## 2024-02-06 PROCEDURE — 3074F SYST BP LT 130 MM HG: CPT | Performed by: UROLOGY

## 2024-02-06 PROCEDURE — 1036F TOBACCO NON-USER: CPT | Performed by: UROLOGY

## 2024-02-06 PROCEDURE — 51741 ELECTRO-UROFLOWMETRY FIRST: CPT | Performed by: UROLOGY

## 2024-02-06 PROCEDURE — 99214 OFFICE O/P EST MOD 30 MIN: CPT | Performed by: UROLOGY

## 2024-02-06 PROCEDURE — 51798 US URINE CAPACITY MEASURE: CPT | Performed by: UROLOGY

## 2024-02-06 PROCEDURE — 3078F DIAST BP <80 MM HG: CPT | Performed by: UROLOGY

## 2024-02-06 PROCEDURE — 4010F ACE/ARB THERAPY RXD/TAKEN: CPT | Performed by: UROLOGY

## 2024-02-06 ASSESSMENT — ENCOUNTER SYMPTOMS
FREQUENCY: 1
FLANK PAIN: 0
DIFFICULTY URINATING: 0
DYSURIA: 0
HEMATURIA: 0

## 2024-02-06 ASSESSMENT — PAIN SCALES - GENERAL: PAINLEVEL: 0-NO PAIN

## 2024-02-06 NOTE — PROGRESS NOTES
Provider Impressions     58 year-old white male, his wife Paulo, is a registered nurse at [employer]. 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 He is presently on PLAVIX. He is disabled due to his heart disease. He also takes Cymbalta. Patient has a positive family history for prostate cancer. He is never smoked cigarettes.      01/17/14 CYSTOSCOPY WITH URODYNAMICS. Myrbetriq 25 mg was prescribed.     04/11/14, nocturia x6 has decreased to x2. His urgency also decreased.     12/19/14 renal ultrasound negative, PSA 0.8. Flow rate is low but there was no volume. Postvoid residual was small. Patient states that his nocturia and frequency are down and he is starting to experience some new urgency. He does not wish to pursue that at this time.     12/21/2015â€“established patient here today for review of testing. Pt states that insurance would not cover his Myrbetriq so he has not taken since June of 2015. States is continuing to have frequency, urgency, urine stream starts and stops, he also is having dribbling at any time, nocturia 2-3 times nightly. PSA 0.5, flow rate 18.6/PVR 28 mL, renal ultrasound shows simple right renal cyst slightly increased in size and left cystic lesion 2 small to characterize.     12/29/2016- spoke with Mr. Campos via telephone. Apparently [employer] requires a prior authorization. We have decided to start with a step process instructed to start oxybutynin 5 mg every 12 hours by mouth. Patient verbalizes an understanding. Patient will reschedule follow-up for 4 weeks. If this does not prove beneficial we may increase oxybutynin to 3 times daily.     1/27/2016â€“established patient here today for follow-up. Flow rate 24.9, PVR 60 mL Pt indicates frequency much better, urgency better but still there. Starting and stopping of urine stream continues. Nocturia 2-3 times nightly. No other concerns     2/24/2016- Established pt here today for f/u on meds, flow rate, and PVR. Pt indicates since starting the TID  "regimen of Oxybutynin he is only waking at night 1x. States urgency is better and he is able to make it to the bathroom. States the starting and stopping of urine stream is better.. States the dribbling is better also. States life is better \"I can sit through a Movie, which was impossible before\". No adverse effects. FR 2.3 PVR 0ml, voided urine was 23.7 ml.     2/14/2017â€“Established pt here today for annual follow up and review of testing.Including renal, ultrasound, and PSA. Also flow rate and PVR. Patient voices no concerns. Reports nocturia Ã--1. Also indicates urgency is better. He feels that he empties completely most of the time. PSA 1.2. Renal ultrasoundâ€“1.6 cm anechoic lesion, compatible with cyst on right kidney. Subcentimeter hypoechoic lesion likely represent she represents a cyst on left kidney both lesions remain unchanged from prior exam. No hydronephrosis. Flow rate 32.8, PVR 52 mL.     04/17/18, patient states his frequency has increased slightly and he would like to increase his oxybutynin once again to 5 mg every 8 hours. PVR only 18 now. Nocturia Ã--1. He continues on daily Flomax. Ultrasound indicates stable 1.7 cm cyst in the right upper pole and a 0.9 cm left upper pole cyst. PSA is normal at 1.6. He will return in December.     PATIENT DISAPPEARED     Patient under the care of Dr. Simmons at [facility]     November 18, 2022, patient calls to reestablish care.     February 9, 2023, patient calls to obtain prescription for Flomax prior to follow-up visit.     March 1, 2023, patient arrives alone. He is providing care for his 13-year-old daughter Beata. He states that he has a good flow of stream however does have occasional urgency and frequency. We will continue his daily Flomax and increase his Myrbetriq to 50 mg. Today's flow rate 21 cc/s, total volume 202 cc, PVR 40 cc. Urinalysis is negative for blood. Urine culture no growth. Urine cytology is negative for malignant cells. PSA is " stable at 1.95. Renal ultrasound identifies bilateral simple cysts unchanged. He will return in December.    January 10, 2024, Dr. Rivas, neurostimulator sensor replaced    February 6, 2024, patient arrives alone.  He has no new urologic complaints.  He continues on daily Flomax and Myrbetriq 50 mg.  Flow rate today is 31 cc/s, total volume 591 cc, PVR 93 cc.  PSA was not obtained.  Renal ultrasound is unremarkable.  Stable bilateral simple cysts.  He will return in December.     PLAN:     #1 Flomax 0.4 mg and Myrbetriq 50 mg both p.o. daily     #2 patient will return in December of 2024. with a PSA, flow rate, post void residual, and renal ultrasound.    Physical Exam  Vitals and nursing note reviewed.   Constitutional:       Appearance: Normal appearance.   HENT:      Head: Normocephalic and atraumatic.   Pulmonary:      Effort: Pulmonary effort is normal.   Abdominal:      Palpations: Abdomen is soft.      Tenderness: There is no abdominal tenderness.   Musculoskeletal:         General: Normal range of motion.      Cervical back: Normal range of motion and neck supple.   Neurological:      General: No focal deficit present.      Mental Status: He is alert and oriented to person, place, and time.   Psychiatric:         Mood and Affect: Mood normal.         Behavior: Behavior normal.         This note was created with voice-recognition software and was not corrected for typographical or grammatical errors.

## 2024-02-06 NOTE — LETTER
February 6, 2024     Magda Gilman DO  [address]  [facility], [address]  [facility] [address]    Patient: Logan Campos   YOB: 1965   Date of Visit: 2/6/2024       Dear Dr. Magda Gilman DO:    Thank you for referring Logan Campos to me for evaluation. Below are my notes for this consultation.  If you have questions, please do not hesitate to call me. I look forward to following your patient along with you.       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 He is presently on PLAVIX. He is disabled due to his heart disease. He also takes Cymbalta. Patient has a positive family history for prostate cancer. He is never smoked cigarettes.      01/17/14 CYSTOSCOPY WITH URODYNAMICS. Myrbetriq 25 mg was prescribed.     04/11/14, nocturia x6 has decreased to x2. His urgency also decreased.     12/19/14 renal ultrasound negative, PSA 0.8. Flow rate is low but there was no volume. Postvoid residual was small. Patient states that his nocturia and frequency are down and he is starting to experience some new urgency. He does not wish to pursue that at this time.     12/21/2015â€“established patient here today for review of testing. Pt states that insurance would not cover his Myrbetriq so he has not taken since June of 2015. States is continuing to have frequency, urgency, urine stream starts and stops, he also is having dribbling at any time, nocturia 2-3 times nightly. PSA 0.5, flow rate 18.6/PVR 28 mL, renal ultrasound shows simple right renal cyst slightly increased in size and left cystic lesion 2 small to characterize.     12/29/2016- spoke with Mr. Campos via telephone. Apparently [employer] requires a prior authorization. We have decided to start with a step  "process instructed to start oxybutynin 5 mg every 12 hours by mouth. Patient verbalizes an understanding. Patient will reschedule follow-up for 4 weeks. If this does not prove beneficial we may increase oxybutynin to 3 times daily.     1/27/2016â€“established patient here today for follow-up. Flow rate 24.9, PVR 60 mL Pt indicates frequency much better, urgency better but still there. Starting and stopping of urine stream continues. Nocturia 2-3 times nightly. No other concerns     2/24/2016- Established pt here today for f/u on meds, flow rate, and PVR. Pt indicates since starting the TID regimen of Oxybutynin he is only waking at night 1x. States urgency is better and he is able to make it to the bathroom. States the starting and stopping of urine stream is better.. States the dribbling is better also. States life is better \"I can sit through a Movie, which was impossible before\". No adverse effects. FR 2.3 PVR 0ml, voided urine was 23.7 ml.     2/14/2017â€“Established pt here today for annual follow up and review of testing.Including renal, ultrasound, and PSA. Also flow rate and PVR. Patient voices no concerns. Reports nocturia Ã--1. Also indicates urgency is better. He feels that he empties completely most of the time. PSA 1.2. Renal ultrasoundâ€“1.6 cm anechoic lesion, compatible with cyst on right kidney. Subcentimeter hypoechoic lesion likely represent she represents a cyst on left kidney both lesions remain unchanged from prior exam. No hydronephrosis. Flow rate 32.8, PVR 52 mL.     04/17/18, patient states his frequency has increased slightly and he would like to increase his oxybutynin once again to 5 mg every 8 hours. PVR only 18 now. Nocturia Ã--1. He continues on daily Flomax. Ultrasound indicates stable 1.7 cm cyst in the right upper pole and a 0.9 cm left upper pole cyst. PSA is normal at 1.6. He will return in December.     PATIENT DISAPPEARED     Patient under the care of Dr. Simmons at [facility] " [facility]     November 18, 2022, patient calls to reestablish care.     February 9, 2023, patient calls to obtain prescription for Flomax prior to follow-up visit.     March 1, 2023, patient arrives alone. He is providing care for his 13-year-old daughter Beata. He states that he has a good flow of stream however does have occasional urgency and frequency. We will continue his daily Flomax and increase his Myrbetriq to 50 mg. Today's flow rate 21 cc/s, total volume 202 cc, PVR 40 cc. Urinalysis is negative for blood. Urine culture no growth. Urine cytology is negative for malignant cells. PSA is stable at 1.95. Renal ultrasound identifies bilateral simple cysts unchanged. He will return in December.    January 10, 2024, Dr. Rivas, neurostimulator sensor replaced    February 6, 2024, patient arrives alone.  He has no new urologic complaints.  He continues on daily Flomax and Myrbetriq 50 mg.  Flow rate today is 31 cc/s, total volume 591 cc, PVR 93 cc.  PSA was not obtained.  Renal ultrasound is unremarkable.  Stable bilateral simple cysts.  He will return in December.     PLAN:     #1 Flomax 0.4 mg and Myrbetriq 50 mg both p.o. daily     #2 patient will return in December of 2024. with a PSA, flow rate, post void residual, and renal ultrasound.    Physical Exam  Vitals and nursing note reviewed.   Constitutional:       Appearance: Normal appearance.   HENT:      Head: Normocephalic and atraumatic.   Pulmonary:      Effort: Pulmonary effort is normal.   Abdominal:      Palpations: Abdomen is soft.      Tenderness: There is no abdominal tenderness.   Musculoskeletal:         General: Normal range of motion.      Cervical back: Normal range of motion and neck supple.   Neurological:      General: No focal deficit present.      Mental Status: He is alert and oriented to person, place, and time.   Psychiatric:         Mood and Affect: Mood normal.         Behavior: Behavior normal.         This note was created with  voice-recognition software and was not corrected for typographical or grammatical errors.

## 2024-02-06 NOTE — PATIENT INSTRUCTIONS
Patient Discussion/Summary     It was very nice to see you today. The ultrasound of your kidneys shows that you still have simple cysts in each kidney that are unchanged. We will continue to follow. We will continue your daily Flomax and Myrbetriq to 50 mg. We will have you return in December of 2024 with the same testing including a renal ultrasound, PSA, Flow rate and Post void residual. Please come to the office with a full bladder. If you have any further questions or concerns please do not hesitate to call the office.     This note was created with voice-recognition software and was not corrected for typographical or grammatical errors.

## 2024-02-06 NOTE — PROGRESS NOTES
Patient denies any pain today. Patient had nuerostimulator sensor replaced with Dr. Stuart on 1/10/24. Patient denies any concerns about falling or safety. Patient has no urinary issues. Patient taking Myrbetriq and Flomax as directed. CV     Review of Systems   Genitourinary:  Positive for frequency and urgency. Negative for decreased urine volume, difficulty urinating, dysuria, flank pain and hematuria.   All other systems reviewed and are negative.

## 2024-02-07 ENCOUNTER — APPOINTMENT (OUTPATIENT)
Dept: CARDIOLOGY | Facility: CLINIC | Age: 59
End: 2024-02-07
Payer: COMMERCIAL

## 2024-02-13 ENCOUNTER — TELEPHONE (OUTPATIENT)
Dept: GASTROENTEROLOGY | Facility: CLINIC | Age: 59
End: 2024-02-13
Payer: COMMERCIAL

## 2024-02-19 ENCOUNTER — APPOINTMENT (OUTPATIENT)
Dept: INFUSION THERAPY | Facility: CLINIC | Age: 59
End: 2024-02-19
Payer: COMMERCIAL

## 2024-02-23 DIAGNOSIS — E11.65 TYPE 2 DIABETES MELLITUS WITH HYPERGLYCEMIA, WITHOUT LONG-TERM CURRENT USE OF INSULIN (MULTI): ICD-10-CM

## 2024-02-23 RX ORDER — TIRZEPATIDE 2.5 MG/.5ML
2.5 INJECTION, SOLUTION SUBCUTANEOUS
Qty: 2 ML | Refills: 0 | Status: CANCELLED | OUTPATIENT
Start: 2024-02-23

## 2024-02-28 ENCOUNTER — TELEMEDICINE (OUTPATIENT)
Dept: PHARMACY | Facility: HOSPITAL | Age: 59
End: 2024-02-28
Payer: COMMERCIAL

## 2024-02-28 DIAGNOSIS — E11.65 TYPE 2 DIABETES MELLITUS WITH HYPERGLYCEMIA, WITHOUT LONG-TERM CURRENT USE OF INSULIN (MULTI): ICD-10-CM

## 2024-02-28 PROCEDURE — RXMED WILLOW AMBULATORY MEDICATION CHARGE

## 2024-02-28 RX ORDER — TIRZEPATIDE 5 MG/.5ML
5 INJECTION, SOLUTION SUBCUTANEOUS
Qty: 2 ML | Refills: 0 | Status: SHIPPED | OUTPATIENT
Start: 2024-02-28 | End: 2024-03-20 | Stop reason: SDUPTHER

## 2024-02-28 NOTE — PROGRESS NOTES
Patient is sent at the request of Magda Gilman DO for my opinion regarding Type 2 diabetes.  My final recommendations will be communicated back to the requesting provider by way of shared medical record.    Subjective     Diabetes  He presents for his follow-up diabetic visit. He has type 2 diabetes mellitus. Diabetic complications include peripheral neuropathy. Risk factors for coronary artery disease include diabetes mellitus, male sex, dyslipidemia and hypertension. An ACE inhibitor/angiotensin II receptor blocker is being taken.     Patient notes some diarrhea with new start Mounjaro. Denies hypoglycemic symptoms or BG <70 mg/dL, but patient notes that he eats snacks throughout the day to avoid low BG. Patient endorses appetite suppression with reduced snacking.     Weight  246 lbs (initial)  239 lbs 2/28/2024    Past Medical History:  He has a past medical history of Anxiety, Arthritis, Coronary artery disease, Depression, Diabetes mellitus (CMS/HCC), Diverticulosis, Hiatal hernia, Hypertension, Lumbar disc disease, Myocardial infarction (CMS/HCC), Personal history of other diseases of the circulatory system, Personal history of other diseases of the digestive system, Personal history of other diseases of the nervous system and sense organs, Pure hypercholesterolemia, unspecified, and Vision loss.    Past Surgical History:  He has a past surgical history that includes Back surgery (12/19/2014); Hernia repair (12/19/2014); Other surgical history (12/19/2014); Tonsillectomy (12/19/2014); Vasectomy (12/19/2014); Cardiac catheterization; Coronary stent placement; Colonoscopy; and Meniscectomy.    Social History:  He reports that he has never smoked. He has never been exposed to tobacco smoke. He has never used smokeless tobacco. He reports that he does not currently use alcohol. He reports current drug use. Drug: Marijuana.    Family History:  Family History   Problem Relation Name Age of Onset    Stroke Father  "     Breast cancer Sister      Stomach cancer Maternal Grandmother         Allergies:  Cat dander, Onion, and Hydromorphone    Current diet: reducing sugar and carbohydrate intake, does not eat for 12 hours a day (after dinner till breakfast the next day)      Current exercise: swimming 3x/week, occasional weight lifting or yoga at home     The patient does not have a known family history of diabetes.    Patient is using: glucometer  The patient is currently checking the blood glucose 1-2 times per day.    Hypoglycemia frequency: infrequent  Hypoglycemia awareness: Yes     AMFBG average: 105-108 mg/dL  Pre-dinner average: 114-116 mg/dL    Objective     Last Recorded Vitals:  There were no vitals filed for this visit.    Diabetes Pharmacotherapy:  Xigduo XR 5-1000 mg 1 tablet daily    Primary/Secondary Prevention   - Statin? Yes  - ACE-I/ARB? Yes  - Aspirin? No    Pertinent PMH Review:  - PMH of Pancreatitis: No  - PMH of Retinopathy: No  - PMH of Urinary Tract Infections: No  - PMH of MTC: No    Lab Review  Lab Results   Component Value Date    BILITOT 0.5 10/12/2023    CALCIUM 10.0 10/12/2023    CO2 27 10/12/2023     10/12/2023    CREATININE 1.40 (H) 10/12/2023    GLUCOSE 157 (H) 10/12/2023    ALKPHOS 108 10/12/2023    K 4.8 10/12/2023    PROT 7.5 10/12/2023     10/12/2023    AST 20 10/12/2023    ALT 25 10/12/2023    BUN 20 10/12/2023    ANIONGAP 17 10/12/2023    MG 2.52 (H) 10/12/2023    PHOS 3.4 03/07/2023    ALBUMIN 4.6 10/12/2023    GFRMALE 75 03/07/2023     Lab Results   Component Value Date    TRIG 176 (H) 10/12/2023    CHOL 133 10/12/2023    LDLCALC 58 10/12/2023    HDL 40.2 10/12/2023     Lab Results   Component Value Date    HGBA1C 7.3 (H) 10/12/2023    HGBA1C 8.4 (A) 02/03/2021     No components found for: \"UACR\"  The ASCVD Risk score (Renetta HERRERA, et al., 2019) failed to calculate for the following reasons:    The patient has a prior MI or stroke diagnosis    Health Maintenance:   Foot Exam: " checked by PCP at every visit  Eye Exam: Sept-Oct 2023  Urine Albumin: 5.4 10/12/2023  Influenza Immunization: 9/12/2023  Pneumonia Immunization: 1/24/2017, 7/20/2022    Drug Interactions:  No significant drug interactions identified at this time     Assessment/Plan   Problem List Items Addressed This Visit       Type 2 diabetes mellitus with hyperglycemia, without long-term current use of insulin (CMS/[facility])     Patients diabetes needs improvement with most recent A1c of 7.3% (Goal < 7%). Given tolerance of Mounjaro, will increase dose to improve glycemic control and weight loss benefit.    Increase: Mounjaro 5 mg once weekly  Continue:   Xigduo XR 5-1000 mg 1 tablet daily  Compliance at present is estimated to be excellent  Patient enrolled in  Employee Health Diabetes Program ([employer]ID)  PA approved for Mounjaro through 12/30/2222   Follow-up: 3/20/2024 at 10:30 am via phone (patient going on vacation in 4 weeks so appointment moved up one week)  PCP Follow-Up: 3/21/2024    Continue all meds under the continuation of care with the referring provider and clinical pharmacy team.

## 2024-03-05 ENCOUNTER — PHARMACY VISIT (OUTPATIENT)
Dept: PHARMACY | Facility: CLINIC | Age: 59
End: 2024-03-05
Payer: COMMERCIAL

## 2024-03-20 ENCOUNTER — TELEMEDICINE (OUTPATIENT)
Dept: PHARMACY | Facility: HOSPITAL | Age: 59
End: 2024-03-20
Payer: COMMERCIAL

## 2024-03-20 DIAGNOSIS — E11.65 TYPE 2 DIABETES MELLITUS WITH HYPERGLYCEMIA, WITHOUT LONG-TERM CURRENT USE OF INSULIN (MULTI): ICD-10-CM

## 2024-03-20 RX ORDER — TIRZEPATIDE 5 MG/.5ML
5 INJECTION, SOLUTION SUBCUTANEOUS
Qty: 2 ML | Refills: 5 | Status: SHIPPED | OUTPATIENT
Start: 2024-03-20

## 2024-03-20 NOTE — PROGRESS NOTES
Patient is sent at the request of Magda Gilman DO for my opinion regarding Type 2 diabetes.  My final recommendations will be communicated back to the requesting provider by way of shared medical record.    Subjective     Diabetes  He presents for his follow-up diabetic visit. He has type 2 diabetes mellitus. Diabetic complications include peripheral neuropathy. Risk factors for coronary artery disease include diabetes mellitus, male sex, dyslipidemia and hypertension. An ACE inhibitor/angiotensin II receptor blocker is being taken.     Patient does not notice a difference between the 2.5 mg to 5 mg Mounjaro dose regarding side effects. Endorses continued appetite suppression. Denies hypoglycemic symptoms or BG <70 mg/dL.     Weight  246 lbs (initial)  239 lbs 2/28/2024  234 lbs 3/20/2024    Past Medical History:  He has a past medical history of Anxiety, Arthritis, Coronary artery disease, Depression, Diabetes mellitus (CMS/HCC), Diverticulosis, Hiatal hernia, Hypertension, Lumbar disc disease, Myocardial infarction (CMS/HCC), Personal history of other diseases of the circulatory system, Personal history of other diseases of the digestive system, Personal history of other diseases of the nervous system and sense organs, Pure hypercholesterolemia, unspecified, and Vision loss.    Past Surgical History:  He has a past surgical history that includes Back surgery (12/19/2014); Hernia repair (12/19/2014); Other surgical history (12/19/2014); Tonsillectomy (12/19/2014); Vasectomy (12/19/2014); Cardiac catheterization; Coronary stent placement; Colonoscopy; and Meniscectomy.    Social History:  He reports that he has never smoked. He has never been exposed to tobacco smoke. He has never used smokeless tobacco. He reports that he does not currently use alcohol. He reports current drug use. Drug: Marijuana.    Family History:  Family History   Problem Relation Name Age of Onset    Stroke Father      Breast cancer Sister  "     Stomach cancer Maternal Grandmother         Allergies:  Cat dander, Onion, and Hydromorphone    Current diet: reducing sugar and carbohydrate intake, does not eat for 12 hours a day (after dinner till breakfast the next day)      Current exercise: swimming 3x/week, occasional weight lifting or yoga at home     The patient does not have a known family history of diabetes.    Patient is using: glucometer  The patient is currently checking the blood glucose 1-2 times per day.    Hypoglycemia frequency: infrequent  Hypoglycemia awareness: Yes     Averages  AMFB-80s  Pre-dinner: 100-110 mg/dL    Objective     Last Recorded Vitals:  There were no vitals filed for this visit.    Diabetes Pharmacotherapy:  Xigduo XR 5-1000 mg 1 tablet daily  Mounjaro 5 mg once weekly    Primary/Secondary Prevention   - Statin? Yes  - ACE-I/ARB? Yes  - Aspirin? No    Pertinent PMH Review:  - PMH of Pancreatitis: No  - PMH of Retinopathy: No  - PMH of Urinary Tract Infections: No  - PMH of MTC: No    Lab Review  Lab Results   Component Value Date    BILITOT 0.5 10/12/2023    CALCIUM 10.0 10/12/2023    CO2 27 10/12/2023     10/12/2023    CREATININE 1.40 (H) 10/12/2023    GLUCOSE 157 (H) 10/12/2023    ALKPHOS 108 10/12/2023    K 4.8 10/12/2023    PROT 7.5 10/12/2023     10/12/2023    AST 20 10/12/2023    ALT 25 10/12/2023    BUN 20 10/12/2023    ANIONGAP 17 10/12/2023    MG 2.52 (H) 10/12/2023    PHOS 3.4 2023    ALBUMIN 4.6 10/12/2023    GFRMALE 75 2023     Lab Results   Component Value Date    TRIG 176 (H) 10/12/2023    CHOL 133 10/12/2023    LDLCALC 58 10/12/2023    HDL 40.2 10/12/2023     Lab Results   Component Value Date    HGBA1C 7.3 (H) 10/12/2023    HGBA1C 8.4 (A) 2021     No components found for: \"UACR\"  The ASCVD Risk score (Renetta DK, et al., 2019) failed to calculate for the following reasons:    The patient has a prior MI or stroke diagnosis    Health Maintenance:   Foot Exam: checked by PCP " at every visit  Eye Exam: Sept-Oct 2023  Urine Albumin: 5.4 10/12/2023  Influenza Immunization: 9/12/2023  Pneumonia Immunization: 1/24/2017, 7/20/2022    Drug Interactions:  No significant drug interactions identified at this time     Assessment/Plan   Problem List Items Addressed This Visit       Type 2 diabetes mellitus with hyperglycemia, without long-term current use of insulin (CMS/[facility])   Patients diabetes needs improvement with most recent A1c of 7.3% (Goal < 7%). Given appetite suppression with current dose and BG control, will continue current doses of DM regimen.    Continue:   Mounjaro 5 mg once weekly  Xigduo XR 5-1000 mg 1 tablet daily  Compliance at present is estimated to be excellent  Patient enrolled in  Employee Health Diabetes Program ([employer])  PA approved for Mounjaro through 12/30/2222   Follow-up: 4/17/2024 at 11:30 am via phone (patient going on vacation in 4 weeks so appointment moved up one week)  PCP Follow-Up: 3/21/2024    Continue all meds under the continuation of care with the referring provider and clinical pharmacy team.

## 2024-03-21 ENCOUNTER — LAB (OUTPATIENT)
Dept: LAB | Facility: LAB | Age: 59
End: 2024-03-21
Payer: COMMERCIAL

## 2024-03-21 ENCOUNTER — INFUSION (OUTPATIENT)
Dept: INFUSION THERAPY | Facility: CLINIC | Age: 59
End: 2024-03-21
Payer: MEDICARE

## 2024-03-21 ENCOUNTER — OFFICE VISIT (OUTPATIENT)
Dept: PRIMARY CARE | Facility: CLINIC | Age: 59
End: 2024-03-21
Payer: MEDICARE

## 2024-03-21 VITALS
HEART RATE: 79 BPM | TEMPERATURE: 97.9 F | SYSTOLIC BLOOD PRESSURE: 109 MMHG | DIASTOLIC BLOOD PRESSURE: 73 MMHG | OXYGEN SATURATION: 92 % | RESPIRATION RATE: 21 BRPM

## 2024-03-21 VITALS
HEIGHT: 68 IN | DIASTOLIC BLOOD PRESSURE: 60 MMHG | BODY MASS INDEX: 36.04 KG/M2 | WEIGHT: 237.8 LBS | HEART RATE: 85 BPM | SYSTOLIC BLOOD PRESSURE: 88 MMHG

## 2024-03-21 DIAGNOSIS — I25.118 CORONARY ARTERY DISEASE INVOLVING NATIVE HEART WITH OTHER FORM OF ANGINA PECTORIS, UNSPECIFIED VESSEL OR LESION TYPE (CMS-HCC): ICD-10-CM

## 2024-03-21 DIAGNOSIS — I25.10 CORONARY ARTERY DISEASE INVOLVING NATIVE CORONARY ARTERY OF NATIVE HEART WITHOUT ANGINA PECTORIS: ICD-10-CM

## 2024-03-21 DIAGNOSIS — E11.65 TYPE 2 DIABETES MELLITUS WITH HYPERGLYCEMIA, WITHOUT LONG-TERM CURRENT USE OF INSULIN (MULTI): Primary | ICD-10-CM

## 2024-03-21 DIAGNOSIS — E11.65 TYPE 2 DIABETES MELLITUS WITH HYPERGLYCEMIA, WITHOUT LONG-TERM CURRENT USE OF INSULIN (MULTI): ICD-10-CM

## 2024-03-21 DIAGNOSIS — M54.42 CHRONIC BILATERAL LOW BACK PAIN WITH LEFT-SIDED SCIATICA: ICD-10-CM

## 2024-03-21 DIAGNOSIS — I10 BENIGN ESSENTIAL HTN: ICD-10-CM

## 2024-03-21 DIAGNOSIS — G89.0 CENTRAL PAIN SYNDROME: Primary | ICD-10-CM

## 2024-03-21 DIAGNOSIS — G89.29 CHRONIC BILATERAL LOW BACK PAIN WITH LEFT-SIDED SCIATICA: ICD-10-CM

## 2024-03-21 DIAGNOSIS — Z96.89 SPINAL CORD STIMULATOR STATUS: ICD-10-CM

## 2024-03-21 DIAGNOSIS — E78.2 MIXED HYPERLIPIDEMIA: ICD-10-CM

## 2024-03-21 DIAGNOSIS — G61.9 INFLAMMATORY NEUROPATHY (MULTI): ICD-10-CM

## 2024-03-21 LAB
ALBUMIN SERPL BCP-MCNC: 4.4 G/DL (ref 3.4–5)
ALP SERPL-CCNC: 101 U/L (ref 33–120)
ALT SERPL W P-5'-P-CCNC: 23 U/L (ref 10–52)
ANION GAP SERPL CALC-SCNC: 14 MMOL/L (ref 10–20)
AST SERPL W P-5'-P-CCNC: 24 U/L (ref 9–39)
BILIRUB SERPL-MCNC: 0.5 MG/DL (ref 0–1.2)
BUN SERPL-MCNC: 16 MG/DL (ref 6–23)
CALCIUM SERPL-MCNC: 10.1 MG/DL (ref 8.6–10.6)
CHLORIDE SERPL-SCNC: 106 MMOL/L (ref 98–107)
CHOLEST SERPL-MCNC: 120 MG/DL (ref 0–199)
CHOLESTEROL/HDL RATIO: 2.8
CO2 SERPL-SCNC: 31 MMOL/L (ref 21–32)
CREAT SERPL-MCNC: 1.35 MG/DL (ref 0.5–1.3)
CREAT UR-MCNC: 132.4 MG/DL (ref 20–370)
EGFRCR SERPLBLD CKD-EPI 2021: 62 ML/MIN/1.73M*2
EST. AVERAGE GLUCOSE BLD GHB EST-MCNC: 134 MG/DL
GLUCOSE SERPL-MCNC: 111 MG/DL (ref 74–99)
HBA1C MFR BLD: 6.3 %
HDLC SERPL-MCNC: 42.3 MG/DL
LDLC SERPL CALC-MCNC: 53 MG/DL
MICROALBUMIN UR-MCNC: 7.3 MG/L
MICROALBUMIN/CREAT UR: 5.5 UG/MG CREAT
NON HDL CHOLESTEROL: 78 MG/DL (ref 0–149)
POTASSIUM SERPL-SCNC: 4.7 MMOL/L (ref 3.5–5.3)
PROT SERPL-MCNC: 6.8 G/DL (ref 6.4–8.2)
SODIUM SERPL-SCNC: 146 MMOL/L (ref 136–145)
TRIGL SERPL-MCNC: 123 MG/DL (ref 0–149)
TSH SERPL-ACNC: 2.07 MIU/L (ref 0.44–3.98)
VLDL: 25 MG/DL (ref 0–40)

## 2024-03-21 PROCEDURE — 3078F DIAST BP <80 MM HG: CPT | Performed by: INTERNAL MEDICINE

## 2024-03-21 PROCEDURE — 80061 LIPID PANEL: CPT

## 2024-03-21 PROCEDURE — 4010F ACE/ARB THERAPY RXD/TAKEN: CPT | Performed by: INTERNAL MEDICINE

## 2024-03-21 PROCEDURE — 99214 OFFICE O/P EST MOD 30 MIN: CPT | Performed by: INTERNAL MEDICINE

## 2024-03-21 PROCEDURE — 84443 ASSAY THYROID STIM HORMONE: CPT

## 2024-03-21 PROCEDURE — 2500000004 HC RX 250 GENERAL PHARMACY W/ HCPCS (ALT 636 FOR OP/ED): Performed by: NURSE PRACTITIONER

## 2024-03-21 PROCEDURE — 82043 UR ALBUMIN QUANTITATIVE: CPT

## 2024-03-21 PROCEDURE — 36415 COLL VENOUS BLD VENIPUNCTURE: CPT

## 2024-03-21 PROCEDURE — 2500000005 HC RX 250 GENERAL PHARMACY W/O HCPCS: Performed by: NURSE PRACTITIONER

## 2024-03-21 PROCEDURE — 96375 TX/PRO/DX INJ NEW DRUG ADDON: CPT | Mod: INF

## 2024-03-21 PROCEDURE — 3061F NEG MICROALBUMINURIA REV: CPT | Performed by: INTERNAL MEDICINE

## 2024-03-21 PROCEDURE — RXMED WILLOW AMBULATORY MEDICATION CHARGE

## 2024-03-21 PROCEDURE — 3044F HG A1C LEVEL LT 7.0%: CPT | Performed by: INTERNAL MEDICINE

## 2024-03-21 PROCEDURE — 96365 THER/PROPH/DIAG IV INF INIT: CPT | Mod: INF

## 2024-03-21 PROCEDURE — 82570 ASSAY OF URINE CREATININE: CPT

## 2024-03-21 PROCEDURE — 80053 COMPREHEN METABOLIC PANEL: CPT

## 2024-03-21 PROCEDURE — 83036 HEMOGLOBIN GLYCOSYLATED A1C: CPT

## 2024-03-21 PROCEDURE — 96368 THER/DIAG CONCURRENT INF: CPT | Mod: INF

## 2024-03-21 PROCEDURE — 3074F SYST BP LT 130 MM HG: CPT | Performed by: INTERNAL MEDICINE

## 2024-03-21 PROCEDURE — 3048F LDL-C <100 MG/DL: CPT | Performed by: INTERNAL MEDICINE

## 2024-03-21 PROCEDURE — 2500000004 HC RX 250 GENERAL PHARMACY W/ HCPCS (ALT 636 FOR OP/ED)

## 2024-03-21 RX ORDER — KETOROLAC TROMETHAMINE 30 MG/ML
INJECTION, SOLUTION INTRAMUSCULAR; INTRAVENOUS
Status: COMPLETED
Start: 2024-03-21 | End: 2024-03-21

## 2024-03-21 RX ORDER — ALBUTEROL SULFATE 0.83 MG/ML
3 SOLUTION RESPIRATORY (INHALATION) AS NEEDED
Status: CANCELLED | OUTPATIENT
Start: 2024-04-04

## 2024-03-21 RX ORDER — DIPHENHYDRAMINE HYDROCHLORIDE 50 MG/ML
50 INJECTION INTRAMUSCULAR; INTRAVENOUS AS NEEDED
Status: CANCELLED | OUTPATIENT
Start: 2024-04-04

## 2024-03-21 RX ORDER — EPINEPHRINE 0.3 MG/.3ML
0.3 INJECTION SUBCUTANEOUS EVERY 5 MIN PRN
Status: CANCELLED | OUTPATIENT
Start: 2024-04-04

## 2024-03-21 RX ORDER — FAMOTIDINE 10 MG/ML
20 INJECTION INTRAVENOUS ONCE AS NEEDED
Status: CANCELLED | OUTPATIENT
Start: 2024-04-04

## 2024-03-21 RX ORDER — NITROGLYCERIN 0.4 MG/1
0.4 TABLET SUBLINGUAL ONCE
Status: CANCELLED | OUTPATIENT
Start: 2024-04-04 | End: 2024-04-04

## 2024-03-21 RX ORDER — KETOROLAC TROMETHAMINE 30 MG/ML
30 INJECTION, SOLUTION INTRAMUSCULAR; INTRAVENOUS ONCE
Status: COMPLETED | OUTPATIENT
Start: 2024-03-21 | End: 2024-03-21

## 2024-03-21 RX ORDER — METOCLOPRAMIDE HYDROCHLORIDE 5 MG/ML
10 INJECTION INTRAMUSCULAR; INTRAVENOUS ONCE
Status: CANCELLED | OUTPATIENT
Start: 2024-04-04 | End: 2024-04-04

## 2024-03-21 RX ORDER — ONDANSETRON HYDROCHLORIDE 2 MG/ML
4 INJECTION, SOLUTION INTRAVENOUS ONCE
Status: CANCELLED | OUTPATIENT
Start: 2024-04-04 | End: 2024-04-04

## 2024-03-21 RX ORDER — KETOROLAC TROMETHAMINE 30 MG/ML
30 INJECTION, SOLUTION INTRAMUSCULAR; INTRAVENOUS ONCE
Status: CANCELLED | OUTPATIENT
Start: 2024-04-04 | End: 2024-04-04

## 2024-03-21 RX ORDER — METOPROLOL TARTRATE 25 MG/1
25 TABLET, FILM COATED ORAL ONCE
Status: CANCELLED | OUTPATIENT
Start: 2024-04-04 | End: 2024-04-04

## 2024-03-21 RX ORDER — DIPHENHYDRAMINE HYDROCHLORIDE 50 MG/ML
25 INJECTION INTRAMUSCULAR; INTRAVENOUS ONCE
Status: CANCELLED | OUTPATIENT
Start: 2024-04-04 | End: 2024-04-04

## 2024-03-21 RX ADMIN — KETOROLAC TROMETHAMINE 30 MG: 30 INJECTION, SOLUTION INTRAMUSCULAR at 12:39

## 2024-03-21 RX ADMIN — Medication: at 12:41

## 2024-03-21 RX ADMIN — PROPOFOL 100 MG: 10 INJECTION, EMULSION INTRAVENOUS at 12:41

## 2024-03-21 RX ADMIN — KETOROLAC TROMETHAMINE 30 MG: 30 INJECTION, SOLUTION INTRAMUSCULAR; INTRAVENOUS at 12:39

## 2024-03-21 ASSESSMENT — ENCOUNTER SYMPTOMS
OCCASIONAL FEELINGS OF UNSTEADINESS: 1
DEPRESSION: 0
LOSS OF SENSATION IN FEET: 1

## 2024-03-21 ASSESSMENT — PATIENT HEALTH QUESTIONNAIRE - PHQ9
2. FEELING DOWN, DEPRESSED OR HOPELESS: NOT AT ALL
SUM OF ALL RESPONSES TO PHQ9 QUESTIONS 1 AND 2: 0
1. LITTLE INTEREST OR PLEASURE IN DOING THINGS: NOT AT ALL

## 2024-03-21 ASSESSMENT — PAIN SCALES - GENERAL
PAINLEVEL_OUTOF10: 7
PAINLEVEL: 0-NO PAIN
PAINLEVEL_OUTOF10: 2

## 2024-03-21 NOTE — PATIENT INSTRUCTIONS
Today :We administered (ketamine 30 mg, lidocaine (Xylocaine) 20 mg/mL (2 %) 300 mg in sodium chloride 0.9% 500 mL IV), propofol (Diprivan) 100 mg in dextrose 5 % in water (D5W) 25 mL, ketorolac, and ketorolac.     For:   1. Central pain syndrome    2. Chronic bilateral low back pain with left-sided sciatica    3. Spinal cord stimulator status       (Tell all doctors including dentists that you are taking this medication)     Go to the emergency room or call 911 if:  -You have signs of allergic reaction:   -Rash, hives, itching.   -Swollen, blistered, peeling skin.   -Swelling of face, lips, mouth, tongue or throat.   -Tightness of chest, trouble breathing, swallowing or talking     Call your doctor:  - If IV / injection site gets red, warm, swollen, itchy or leaks fluid or pus.     (Leave dressing on your IV site for at least 2 hours and keep area clean and dry  - If you get sick or have symptoms of infection or are not feeling well for any reason.    (Wash your hands often, stay away from people who are sick)  - If you have side effects from your medication that do not go away or are bothersome.     (Refer to the teaching your nurse gave you for side effects to call your doctor about)    - Common side effects may include:  stuffy nose, headache, feeling tired, muscle aches, upset stomach  - Before receiving any vaccines     - Call the [facility] at   If:  - You get sick, are on antibiotics, have had a recent vaccine, have surgery or dental work and your doctor wants your visit rescheduled.  - You need to cancel and reschedule your visit for any reason. Call at least 2 days before your visit if you need to cancel.   - Your insurance changes before your next visit.    (We will need to get approval from your new insurance. This can take up to two weeks.)     The [facility] is opened Monday thru Friday. We are closed on weekends and holidays.   Voice mail will take your call if the center is  closed. If you leave a message please allow 24 hours for a call back during weekdays. If you leave a message on a weekend/holiday, we will call you back the next business day.              [facility]      Pain Infusion Aftercare Instructions      1. It is normal to feel sedated, tired and low in energy after a pain infusion. DO NOT DRIVE, OPERATE ANY MACHINERY, OR MAKE ANY IMPORTANT DECISIONS FOR AT LEAST 24 HOURS AFTER THE INFUSION.     2. Call the pain center at [phone] with any problems, questions, or concerns.     3. Eat light after the infusion. If you feel queasy or sick to your stomach, laying down with your eyes closed may help. When you resume eating start with something mild like clear liquids, yogurt, applesauce, crackers, etc… Gradually advance to a regular diet.     4. Do not leave your house alone the evening of your pain infusion.     5. No alcohol or sedative medications, such as sleeping pills, for 24 hours after your pain infusion.     6. Resume all other prescribed medications unless directed otherwise by you physician.     7. If you have any medical emergencies, call 911 or go directly to the closest emergency room.

## 2024-03-21 NOTE — PROGRESS NOTES
"Subjective   Logan Campos is a 58 y.o. male who presents for Follow-up (Patient is here for follow up visit.).    He has lost 14 pounds since February : he is at 237  He finds he has more energy    He is on mounjaro for 6 weeks  He had diarrhea,which is becoming less often  He has noted good appetitie suppression  His morning sugars have been getting lower   He is on xigduo once daily     He just got blood test done this am.  He is going to [place] and [place] next week to visit his grandchildren    No shortness of breath    He had a new neurostimulator Feb 10 ; it is MRI compatible now.       Review of Systems    Objective   BP 88/60   Pulse 85   Ht 1.727 m (5' 8\")   Wt 108 kg (237 lb 12.8 oz)   BMI 36.16 kg/m²    Physical Exam  Visit Vitals  BP 88/60   Pulse 85   Ht 1.727 m (5' 8\")   Wt 108 kg (237 lb 12.8 oz)   BMI 36.16 kg/m²   Smoking Status Never   BSA 2.28 m²      GEN: NAD  HEENT: normal  NECK: no adenopathy, no thyroid enlargment  LUNGS: CTAB  CV: reg S1/S2 no murmurs  ABD: soft, nontender,no mass or organomegaly  EXT: no leg edema   Assessment/Plan   Problem List Items Addressed This Visit       CAD (coronary artery disease) remain on atorvastatin and clopidogrel and aspirin.  . Also on diltiazem  360 and also imdur.    Benign hypertension: remain on losartan     Type 2 diabetes mellitus with hyperglycemia, without long-term current use of insulin (CMS/HCC) - he is taking mounjaro and is doing well, has had considerable weight loss. Continue on xigduo. Continue monitoring sugars at home. Continues to be followed by pharmacist at .       Inflammatory neuropathy (CMS/HCC) taking pregabalin and duloxetine.     See me again in 3 mo.           "

## 2024-03-22 ENCOUNTER — PHARMACY VISIT (OUTPATIENT)
Dept: PHARMACY | Facility: CLINIC | Age: 59
End: 2024-03-22
Payer: COMMERCIAL

## 2024-03-24 DIAGNOSIS — G25.81 RLS (RESTLESS LEGS SYNDROME): ICD-10-CM

## 2024-03-24 DIAGNOSIS — R11.0 NAUSEA: ICD-10-CM

## 2024-03-24 DIAGNOSIS — G47.00 INSOMNIA, UNSPECIFIED TYPE: ICD-10-CM

## 2024-03-25 RX ORDER — TRAZODONE HYDROCHLORIDE 100 MG/1
TABLET ORAL
Qty: 90 TABLET | Refills: 2 | OUTPATIENT
Start: 2024-03-25

## 2024-04-01 DIAGNOSIS — G89.4 CHRONIC PAIN SYNDROME: ICD-10-CM

## 2024-04-01 RX ORDER — PREGABALIN 50 MG/1
50 CAPSULE ORAL 3 TIMES DAILY
Qty: 90 CAPSULE | Refills: 2 | Status: SHIPPED | OUTPATIENT
Start: 2024-04-01 | End: 2024-06-30

## 2024-04-03 ENCOUNTER — TELEPHONE (OUTPATIENT)
Dept: PAIN MEDICINE | Facility: CLINIC | Age: 59
End: 2024-04-03
Payer: COMMERCIAL

## 2024-04-03 DIAGNOSIS — G61.9 INFLAMMATORY NEUROPATHY (MULTI): ICD-10-CM

## 2024-04-03 RX ORDER — DULOXETIN HYDROCHLORIDE 60 MG/1
60 CAPSULE, DELAYED RELEASE ORAL DAILY
Qty: 30 CAPSULE | Refills: 11 | Status: CANCELLED | OUTPATIENT
Start: 2024-04-03

## 2024-04-03 NOTE — TELEPHONE ENCOUNTER
Med refill request. Cymbalta to [facility] and ketamine to [facility] pharmacy. Uds/csa 9/21/23

## 2024-04-05 RX ORDER — KETAMINE HCL 100 %
POWDER (GRAM) MISCELLANEOUS
Qty: 20 G | Refills: 5 | Status: SHIPPED | OUTPATIENT
Start: 2024-04-05

## 2024-04-05 RX ORDER — TRAZODONE HYDROCHLORIDE 100 MG/1
100 TABLET ORAL NIGHTLY
Qty: 90 TABLET | Refills: 3 | Status: SHIPPED | OUTPATIENT
Start: 2024-04-05

## 2024-04-05 RX ORDER — ROPINIROLE 1 MG/1
1 TABLET, FILM COATED ORAL NIGHTLY
Qty: 90 TABLET | Refills: 1 | Status: SHIPPED | OUTPATIENT
Start: 2024-04-05

## 2024-04-05 RX ORDER — PROMETHAZINE HYDROCHLORIDE 25 MG/1
25 TABLET ORAL EVERY 6 HOURS PRN
Qty: 30 TABLET | Refills: 1 | Status: SHIPPED | OUTPATIENT
Start: 2024-04-05

## 2024-04-05 RX ORDER — DULOXETIN HYDROCHLORIDE 60 MG/1
60 CAPSULE, DELAYED RELEASE ORAL DAILY
Qty: 30 CAPSULE | Refills: 11 | Status: SHIPPED | OUTPATIENT
Start: 2024-04-05

## 2024-04-05 NOTE — TELEPHONE ENCOUNTER
Orders Placed This Encounter      DULoxetine (Cymbalta) 60 mg DR capsule      ketamine HCl (ketamine, bulk,) 100 % powder

## 2024-04-17 ENCOUNTER — TELEMEDICINE (OUTPATIENT)
Dept: PHARMACY | Facility: HOSPITAL | Age: 59
End: 2024-04-17
Payer: COMMERCIAL

## 2024-04-17 DIAGNOSIS — E11.65 TYPE 2 DIABETES MELLITUS WITH HYPERGLYCEMIA, WITHOUT LONG-TERM CURRENT USE OF INSULIN (MULTI): ICD-10-CM

## 2024-04-17 NOTE — PROGRESS NOTES
Patient is sent at the request of Magda Gilman DO for my opinion regarding Type 2 diabetes.  My final recommendations will be communicated back to the requesting provider by way of shared medical record.    Subjective     Diabetes  He presents for his follow-up diabetic visit. He has type 2 diabetes mellitus. Diabetic complications include peripheral neuropathy. Risk factors for coronary artery disease include diabetes mellitus, male sex, dyslipidemia and hypertension. An ACE inhibitor/angiotensin II receptor blocker is being taken.     Denies hypoglycemic symptoms or BG <70 mg/dL. No complaints today. Notes that A1c has improved.      Patient would like to lose 30 more pounds. Goal <200 lbs.     Weight  246 lbs (initial)  239 lbs 2/28/2024  234 lbs 3/20/2024  229 lbs 4/17/2024    Past Medical History:  He has a past medical history of Anxiety, Arthritis, Coronary artery disease, Depression, Diabetes mellitus (Multi), Diverticulosis, Hiatal hernia, Hypertension, Lumbar disc disease, Myocardial infarction (Multi), Personal history of other diseases of the circulatory system, Personal history of other diseases of the digestive system, Personal history of other diseases of the nervous system and sense organs, Pure hypercholesterolemia, unspecified, and Vision loss.    Past Surgical History:  He has a past surgical history that includes Back surgery (12/19/2014); Hernia repair (12/19/2014); Other surgical history (12/19/2014); Tonsillectomy (12/19/2014); Vasectomy (12/19/2014); Cardiac catheterization; Coronary stent placement; Colonoscopy; and Meniscectomy.    Social History:  He reports that he has never smoked. He has never been exposed to tobacco smoke. He has never used smokeless tobacco. He reports that he does not currently use alcohol. He reports current drug use. Drug: Marijuana.    Family History:  Family History   Problem Relation Name Age of Onset    Stroke Father      Breast cancer Sister      Stomach  "cancer Maternal Grandmother         Allergies:  Cat dander, Onion, and Hydromorphone    Current diet: reducing sugar and carbohydrate intake, does not eat for 12 hours a day (after dinner till breakfast the next day)      Current exercise: swimming 3x/week, occasional weight lifting or yoga at home     The patient does not have a known family history of diabetes.    Patient is using: glucometer  The patient is currently checking the blood glucose 1-2 times per day.    Hypoglycemia frequency: infrequent  Hypoglycemia awareness: Yes     Averages  AMFB-105 mg/dL  Pre-dinner: 110-115 mg/dL    Objective     Last Recorded Vitals:  There were no vitals filed for this visit.    Diabetes Pharmacotherapy:  Xigduo XR 5-1000 mg 1 tablet daily  Mounjaro 5 mg once weekly    Primary/Secondary Prevention   - Statin? Yes  - ACE-I/ARB? Yes  - Aspirin? No    Pertinent PMH Review:  - PMH of Pancreatitis: No  - PMH of Retinopathy: No  - PMH of Urinary Tract Infections: No  - PMH of MTC: No    Lab Review  Lab Results   Component Value Date    BILITOT 0.5 2024    CALCIUM 10.1 2024    CO2 31 2024     2024    CREATININE 1.35 (H) 2024    GLUCOSE 111 (H) 2024    ALKPHOS 101 2024    K 4.7 2024    PROT 6.8 2024     (H) 2024    AST 24 2024    ALT 23 2024    BUN 16 2024    ANIONGAP 14 2024    MG 2.52 (H) 10/12/2023    PHOS 3.4 2023    ALBUMIN 4.4 2024    GFRMALE 75 2023     Lab Results   Component Value Date    TRIG 123 2024    CHOL 120 2024    LDLCALC 53 2024    HDL 42.3 2024     Lab Results   Component Value Date    HGBA1C 6.3 (H) 2024    HGBA1C 7.3 (H) 10/12/2023    HGBA1C 8.4 (A) 2021     No components found for: \"UACR\"  The ASCVD Risk score (Renetta HERRERA, et al., 2019) failed to calculate for the following reasons:    The patient has a prior MI or stroke diagnosis    Health Maintenance: "   Foot Exam: checked by PCP at every visit  Eye Exam: Sept-Oct 2023  Urine Albumin: 5.4 10/12/2023  Influenza Immunization: 9/12/2023  Pneumonia Immunization: 1/24/2017, 7/20/2022    Drug Interactions:  No significant drug interactions identified at this time     Assessment/Plan   Problem List Items Addressed This Visit       Type 2 diabetes mellitus with hyperglycemia, without long-term current use of insulin (Multi)   Patients diabetes well controlled with most recent A1c of 6.3% (Goal < 7%). As patient is still seeing weight loss with current therapy, will continue dosing regimen.    Continue:   Mounjaro 5 mg once weekly  Xigduo XR 5-1000 mg 1 tablet daily  Compliance at present is estimated to be excellent  Patient enrolled in  Employee Health Diabetes Program ([employer]ID)  PA approved for Mounjaro through 12/30/2222   Follow-up: 5/15/2024 at 11:30 am via phone  PCP Follow-Up: 6/20/2024    Continue all meds under the continuation of care with the referring provider and clinical pharmacy team.

## 2024-04-18 ENCOUNTER — APPOINTMENT (OUTPATIENT)
Dept: INFUSION THERAPY | Facility: CLINIC | Age: 59
End: 2024-04-18
Payer: COMMERCIAL

## 2024-05-03 PROCEDURE — RXMED WILLOW AMBULATORY MEDICATION CHARGE

## 2024-05-06 PROBLEM — K57.32 DIVERTICULITIS OF COLON: Status: ACTIVE | Noted: 2024-05-06

## 2024-05-06 PROBLEM — E11.9 TYPE 2 DIABETES MELLITUS (MULTI): Status: ACTIVE | Noted: 2022-07-20

## 2024-05-07 ENCOUNTER — PHARMACY VISIT (OUTPATIENT)
Dept: PHARMACY | Facility: CLINIC | Age: 59
End: 2024-05-07
Payer: COMMERCIAL

## 2024-05-14 ENCOUNTER — LAB (OUTPATIENT)
Dept: LAB | Facility: LAB | Age: 59
End: 2024-05-14
Payer: COMMERCIAL

## 2024-05-14 DIAGNOSIS — E11.65 TYPE 2 DIABETES MELLITUS WITH HYPERGLYCEMIA (MULTI): ICD-10-CM

## 2024-05-14 DIAGNOSIS — I25.2 OLD MYOCARDIAL INFARCTION: ICD-10-CM

## 2024-05-14 DIAGNOSIS — E11.9 TYPE 2 DIABETES MELLITUS WITHOUT COMPLICATIONS (MULTI): ICD-10-CM

## 2024-05-14 DIAGNOSIS — I20.1 ANGINA PECTORIS WITH DOCUMENTED SPASM (CMS-HCC): ICD-10-CM

## 2024-05-14 DIAGNOSIS — Z95.5 PRESENCE OF CORONARY ANGIOPLASTY IMPLANT AND GRAFT: ICD-10-CM

## 2024-05-14 DIAGNOSIS — I20.9 ANGINA PECTORIS, UNSPECIFIED (CMS-HCC): ICD-10-CM

## 2024-05-14 DIAGNOSIS — I25.10 ATHEROSCLEROTIC HEART DISEASE OF NATIVE CORONARY ARTERY WITHOUT ANGINA PECTORIS: ICD-10-CM

## 2024-05-14 LAB
ALBUMIN SERPL BCP-MCNC: 4.3 G/DL (ref 3.4–5)
ALP SERPL-CCNC: 89 U/L (ref 33–120)
ALT SERPL W P-5'-P-CCNC: 22 U/L (ref 10–52)
ANION GAP SERPL CALC-SCNC: 13 MMOL/L (ref 10–20)
AST SERPL W P-5'-P-CCNC: 19 U/L (ref 9–39)
BILIRUB SERPL-MCNC: 0.7 MG/DL (ref 0–1.2)
BUN SERPL-MCNC: 13 MG/DL (ref 6–23)
CALCIUM SERPL-MCNC: 9.8 MG/DL (ref 8.6–10.6)
CHLORIDE SERPL-SCNC: 106 MMOL/L (ref 98–107)
CHOLEST SERPL-MCNC: 100 MG/DL (ref 0–199)
CHOLESTEROL/HDL RATIO: 2.8
CO2 SERPL-SCNC: 29 MMOL/L (ref 21–32)
CREAT SERPL-MCNC: 1.24 MG/DL (ref 0.5–1.3)
EGFRCR SERPLBLD CKD-EPI 2021: 68 ML/MIN/1.73M*2
GLUCOSE SERPL-MCNC: 105 MG/DL (ref 74–99)
HDLC SERPL-MCNC: 35.9 MG/DL
LDLC SERPL CALC-MCNC: 39 MG/DL
NON HDL CHOLESTEROL: 64 MG/DL (ref 0–149)
POTASSIUM SERPL-SCNC: 4.4 MMOL/L (ref 3.5–5.3)
PROT SERPL-MCNC: 6.5 G/DL (ref 6.4–8.2)
SODIUM SERPL-SCNC: 144 MMOL/L (ref 136–145)
TRIGL SERPL-MCNC: 127 MG/DL (ref 0–149)
VLDL: 25 MG/DL (ref 0–40)

## 2024-05-14 PROCEDURE — 36415 COLL VENOUS BLD VENIPUNCTURE: CPT

## 2024-05-14 PROCEDURE — 80053 COMPREHEN METABOLIC PANEL: CPT

## 2024-05-14 PROCEDURE — 80061 LIPID PANEL: CPT

## 2024-05-15 ENCOUNTER — TELEMEDICINE (OUTPATIENT)
Dept: PHARMACY | Facility: HOSPITAL | Age: 59
End: 2024-05-15
Payer: COMMERCIAL

## 2024-05-15 DIAGNOSIS — E11.65 TYPE 2 DIABETES MELLITUS WITH HYPERGLYCEMIA, WITHOUT LONG-TERM CURRENT USE OF INSULIN (MULTI): ICD-10-CM

## 2024-05-15 NOTE — PROGRESS NOTES
Patient is sent at the request of Magda Gilman DO for my opinion regarding Type 2 diabetes.  My final recommendations will be communicated back to the requesting provider by way of shared medical record.    Subjective     Diabetes  He presents for his follow-up diabetic visit. He has type 2 diabetes mellitus. Diabetic complications include peripheral neuropathy. Risk factors for coronary artery disease include diabetes mellitus, male sex, dyslipidemia and hypertension. An ACE inhibitor/angiotensin II receptor blocker is being taken.     Patient notes 1-2 instances of low BG symptoms but he was able to treat these with resolution of symptoms. Patient is pleased with weight loss progress. Notes that prior to starting Mounjaro, he had lost ~50 pounds on his own.     Weight (goal <200 lbs)  246 lbs (initial)  239 lbs 2/28/2024  234 lbs 3/20/2024  229 lbs 4/17/2024  223 lbs 5/15/2024    Past Medical History:  He has a past medical history of Anxiety, Arthritis, Coronary artery disease, Depression, Diabetes mellitus (Multi), Diverticulosis, Hiatal hernia, Hypertension, Lumbar disc disease, Myocardial infarction (Multi), Personal history of other diseases of the circulatory system, Personal history of other diseases of the digestive system, Personal history of other diseases of the nervous system and sense organs, Pure hypercholesterolemia, unspecified, and Vision loss.    Past Surgical History:  He has a past surgical history that includes Back surgery (12/19/2014); Hernia repair (12/19/2014); Other surgical history (12/19/2014); Tonsillectomy (12/19/2014); Vasectomy (12/19/2014); Cardiac catheterization; Coronary stent placement; Colonoscopy; and Meniscectomy.    Social History:  He reports that he has never smoked. He has never been exposed to tobacco smoke. He has never used smokeless tobacco. He reports that he does not currently use alcohol. He reports current drug use. Drug: Marijuana.    Family History:  Family  "History   Problem Relation Name Age of Onset    Stroke Father      Breast cancer Sister      Stomach cancer Maternal Grandmother         Allergies:  Cat dander, Onion, and Hydromorphone    Current diet: reducing sugar and carbohydrate intake, does not eat for 12 hours a day (after dinner till breakfast the next day)      Current exercise: swimming 3x/week, occasional weight lifting or yoga at home     The patient does not have a known family history of diabetes.    Patient is using: glucometer  The patient is currently checking the blood glucose 1-2 times per day.    Hypoglycemia frequency: infrequent  Hypoglycemia awareness: Yes     Averages  AMFB-105 mg/dL  Pre-dinner: 110-115 mg/dL    Objective     Last Recorded Vitals:  There were no vitals filed for this visit.    Diabetes Pharmacotherapy:  Xigduo XR 5-1000 mg 1 tablet daily  Mounjaro 5 mg once weekly    Primary/Secondary Prevention   - Statin? Yes  - ACE-I/ARB? Yes  - Aspirin? No    Pertinent PMH Review:  - PMH of Pancreatitis: No  - PMH of Retinopathy: No  - PMH of Urinary Tract Infections: No  - PMH of MTC: No    Lab Review  Lab Results   Component Value Date    BILITOT 0.7 2024    CALCIUM 9.8 2024    CO2 29 2024     2024    CREATININE 1.24 2024    GLUCOSE 105 (H) 2024    ALKPHOS 89 2024    K 4.4 2024    PROT 6.5 2024     2024    AST 19 2024    ALT 22 2024    BUN 13 2024    ANIONGAP 13 2024    MG 2.52 (H) 10/12/2023    PHOS 3.4 2023    ALBUMIN 4.3 2024    GFRMALE 75 2023     Lab Results   Component Value Date    TRIG 127 2024    CHOL 100 2024    LDLCALC 39 2024    HDL 35.9 2024     Lab Results   Component Value Date    HGBA1C 6.3 (H) 2024    HGBA1C 7.3 (H) 10/12/2023    HGBA1C 8.4 (A) 2021     No components found for: \"UACR\"  The ASCVD Risk score (Renetta DK, et al., 2019) failed to calculate for the " following reasons:    The patient has a prior MI or stroke diagnosis    Health Maintenance:   Foot Exam: checked by PCP at every visit  Eye Exam: Sept-Oct 2023  Urine Albumin: 5.4 10/12/2023  Influenza Immunization: 9/12/2023  Pneumonia Immunization: 1/24/2017, 7/20/2022    Drug Interactions:  No significant drug interactions identified at this time     Assessment/Plan   Problem List Items Addressed This Visit       Type 2 diabetes mellitus (Multi)    Relevant Orders    Follow Up In [facility] Pharmacy   Patients diabetes well controlled with most recent A1c of 6.3% (Goal < 7%). As BG is controlled with continue weight loss, will continue current therapy.     Continue:   Mounjaro 5 mg once weekly  Xigduo XR 5-1000 mg 1 tablet daily  Compliance at present is estimated to be excellent  Patient enrolled in  Employee Health Diabetes Program ([employer])  PA approved for Mounjaro through 12/30/2222   Follow-up: 7/17/2024 at 11:30 am via phone  PCP Follow-Up: 6/20/2024    Continue all meds under the continuation of care with the referring provider and clinical pharmacy team.

## 2024-05-17 ENCOUNTER — APPOINTMENT (OUTPATIENT)
Dept: CARDIOLOGY | Facility: CLINIC | Age: 59
End: 2024-05-17
Payer: COMMERCIAL

## 2024-05-23 ENCOUNTER — OFFICE VISIT (OUTPATIENT)
Dept: CARDIOLOGY | Facility: CLINIC | Age: 59
End: 2024-05-23
Payer: COMMERCIAL

## 2024-05-23 VITALS
HEIGHT: 68 IN | DIASTOLIC BLOOD PRESSURE: 61 MMHG | OXYGEN SATURATION: 93 % | WEIGHT: 219 LBS | BODY MASS INDEX: 33.19 KG/M2 | SYSTOLIC BLOOD PRESSURE: 92 MMHG | HEART RATE: 107 BPM

## 2024-05-23 DIAGNOSIS — I25.118 CORONARY ARTERY DISEASE OF NATIVE ARTERY OF NATIVE HEART WITH STABLE ANGINA PECTORIS (CMS-HCC): Primary | ICD-10-CM

## 2024-05-23 DIAGNOSIS — I25.2 H/O ACUTE MYOCARDIAL INFARCTION: ICD-10-CM

## 2024-05-23 DIAGNOSIS — I10 ESSENTIAL HYPERTENSION: ICD-10-CM

## 2024-05-23 DIAGNOSIS — I20.1 PRINZMETAL VARIANT ANGINA (CMS-HCC): ICD-10-CM

## 2024-05-23 DIAGNOSIS — Z95.5 CORONARY STENT PATENT: ICD-10-CM

## 2024-05-23 DIAGNOSIS — E78.2 MIXED HYPERLIPIDEMIA: ICD-10-CM

## 2024-05-23 PROCEDURE — 3061F NEG MICROALBUMINURIA REV: CPT | Performed by: NURSE PRACTITIONER

## 2024-05-23 PROCEDURE — 1036F TOBACCO NON-USER: CPT | Performed by: NURSE PRACTITIONER

## 2024-05-23 PROCEDURE — 99212 OFFICE O/P EST SF 10 MIN: CPT | Performed by: NURSE PRACTITIONER

## 2024-05-23 PROCEDURE — 3074F SYST BP LT 130 MM HG: CPT | Performed by: NURSE PRACTITIONER

## 2024-05-23 PROCEDURE — 3044F HG A1C LEVEL LT 7.0%: CPT | Performed by: NURSE PRACTITIONER

## 2024-05-23 PROCEDURE — 3078F DIAST BP <80 MM HG: CPT | Performed by: NURSE PRACTITIONER

## 2024-05-23 PROCEDURE — 4010F ACE/ARB THERAPY RXD/TAKEN: CPT | Performed by: NURSE PRACTITIONER

## 2024-05-23 PROCEDURE — 3048F LDL-C <100 MG/DL: CPT | Performed by: NURSE PRACTITIONER

## 2024-05-23 RX ORDER — DILTIAZEM HYDROCHLORIDE 360 MG/1
360 CAPSULE, EXTENDED RELEASE ORAL DAILY
Qty: 90 CAPSULE | Refills: 3 | Status: SHIPPED | OUTPATIENT
Start: 2024-05-23 | End: 2025-05-23

## 2024-05-23 ASSESSMENT — PATIENT HEALTH QUESTIONNAIRE - PHQ9
SUM OF ALL RESPONSES TO PHQ9 QUESTIONS 1 AND 2: 1
1. LITTLE INTEREST OR PLEASURE IN DOING THINGS: NOT AT ALL
2. FEELING DOWN, DEPRESSED OR HOPELESS: SEVERAL DAYS

## 2024-05-23 ASSESSMENT — PAIN SCALES - GENERAL: PAINLEVEL: 0-NO PAIN

## 2024-05-23 NOTE — PROGRESS NOTES
"Logan Campos is a 58 y.o. male     History Of Present Illness        History Of Present Illness:    Logan Campos is a 58 y.o. male presenting with a PMH of MI x2, Coronary artery stents X8, T2DM, COPD and chronic back pain here for a routine follow up. He previously underwent a left heart catheterization which revealed normal coronaries and patent stents. He has recently lost 25 lbs and is now exercising (walking and swimming) with no complaints.  He does occasionally have episodes of angina, but admits this is not as frequent as 6 months ag.  His BP is well controlled.  He denies any complaints of shortness of breath.  He also notes marked improvement in his back pain since he lost weight.      Last Recorded Vitals:  Vitals:    05/23/24 1123   Weight: 99.3 kg (219 lb)   Height: 1.727 m (5' 8\")       Past Medical History:  He has a past medical history of Anxiety, Arthritis, Coronary artery disease, Depression, Diabetes mellitus (Multi), Diverticulosis, Hiatal hernia, Hypertension, Lumbar disc disease, Myocardial infarction (Multi), Personal history of other diseases of the circulatory system, Personal history of other diseases of the digestive system, Personal history of other diseases of the nervous system and sense organs, Pure hypercholesterolemia, unspecified, and Vision loss.    Past Surgical History:  He has a past surgical history that includes Back surgery (12/19/2014); Hernia repair (12/19/2014); Other surgical history (12/19/2014); Tonsillectomy (12/19/2014); Vasectomy (12/19/2014); Cardiac catheterization; Coronary stent placement; Colonoscopy; and Meniscectomy.      Social History:  He reports that he has never smoked. He has never been exposed to tobacco smoke. He has never used smokeless tobacco. He reports that he does not currently use alcohol. He reports current drug use. Drug: Marijuana.    Family History:  Family History   Problem Relation Name Age of Onset    Stroke Father      Breast cancer " Sister      Stomach cancer Maternal Grandmother     Constitutional: not feeling tired.   Eyes: no eyesight problems.  No vision loss or change in vision  ENT: no hearing loss and no nosebleeds.   Cardiovascular: No intermittent leg claudication,   Occasional episodes of angina  No shortness of breath,  No palpitations,  No lower extremity edema  The heart rate is regular  Respiratory: no chronic cough and no shortness of breath.   Gastrointestinal: no change in bowel habits and no blood in stools.   Genitourinary: no urinary frequency.   Skin: no skin rashes.   Neurological: No frequent falls.   No dizziness  No weakness  Denies headaches  Psychiatric: no depression and not suicidal.   All other systems have been reviewed and are negative for complaint.       Allergies:  Cat dander, Onion, and Hydromorphone    Outpatient Medications:  Current Outpatient Medications   Medication Instructions    aspirin 325 mg tablet 1 tablet, oral, Daily    atorvastatin (LIPITOR) 80 mg, oral, Daily    cholecalciferol (Vitamin D-3) 5,000 Units tablet 1 tablet, oral, Daily    chrm/vineg/bit-orang peel/gr t (APPLE CIDER VINEGAR PLUS ORAL) oral, Daily    clopidogrel (PLAVIX) 75 mg, oral, Daily    dapaglifloz propaned-metformin (Xigduo XR) 5-1,000 mg 1 tablet, oral, Daily with breakfast    DULoxetine (CYMBALTA) 60 mg, oral, Daily    EPINEPHrine (EpiPen) 0.3 mg/0.3 mL injection syringe 0.3 mL, Inject into upper leg. Call 911 after use.    fish oil concentrate (Omega-3) 120-180 mg capsule 6,000 mg, oral, Daily    hydrOXYzine pamoate (VISTARIL) 50 mg, oral, Once    isosorbide mononitrate ER (Imdur) 30 mg 24 hr tablet 1 tablet, oral, Daily    ketamine HCl (ketamine, bulk,) 100 % powder Ketamine 100 mg/mL + lidocaine 40 mg/mL 1 puff 4 times daily as needed, DSP#20 mL x 5 refills    lidocaine HCl, bulk, 100 % powder powder In ketamine nasal spray    losartan (COZAAR) 50 mg, oral, Daily    Mounjaro 5 mg, subcutaneous, Every 7 days    Myrbetriq  50 mg, oral, Daily    nitroglycerin (Nitrostat) 0.4 mg SL tablet sublingual    NON FORMULARY Alive veggie based vitamins    NON FORMULARY Medical Marijuana    pantoprazole (PROTONIX) 40 mg, oral, Daily, Do not crush, chew, or split.    pregabalin (LYRICA) 50 mg, oral, 3 times daily    promethazine (PHENERGAN) 25 mg, oral, Every 6 hours PRN    rOPINIRole (REQUIP) 1 mg, oral, Nightly    sertraline (ZOLOFT) 100 mg, oral, Daily    tamsulosin (FLOMAX) 0.4 mg, oral, Daily    Tiadylt  mg 24 hr capsule 1 capsule, oral, Daily    tiZANidine (ZANAFLEX) 4 mg, oral, 3 times daily PRN    traZODone (DESYREL) 100 mg, oral, Nightly, Take one half or one whole tablet about 20 min before bedtime    VITAMIN B COMPLEX ORAL 1 tablet, oral, Daily       Physical Exam:  Constitutional: alert and in no acute distress.   Eyes: no erythema, swelling or discharge from the eye .   Neck: neck is supple, symmetric, trachea midline, no masses  and no thyromegaly .   Pulmonary: No increased work of breathing or signs of respiratory distress    Lungs clear to auscultation.    No friction rub.  Cardiovascular: carotid pulses 2+ bilaterally with no bruit    JVP was normal, no thrills ,   Regular rhythm, normal S1 and S2, no murmurs    Pedal pulses 2+ bilaterally    No edema .   Abdomen: abdomen non-tender, no masses  and no hepatomegaly . No pulsatile mass noted  Skin: skin warm and dry, normal skin turgor .   Psychiatric judgment and insight is normal  and oriented to person, place and time .          Last Labs:  CBC -  Lab Results   Component Value Date    WBC 8.9 03/07/2023    HGB 15.6 03/07/2023    HCT 47.1 03/07/2023    MCV 91 03/07/2023     03/07/2023       CMP -  Lab Results   Component Value Date    CALCIUM 9.8 05/14/2024    PHOS 3.4 03/07/2023    PROT 6.5 05/14/2024    ALBUMIN 4.3 05/14/2024    AST 19 05/14/2024    ALT 22 05/14/2024    ALKPHOS 89 05/14/2024    BILITOT 0.7 05/14/2024       LIPID PANEL -   Lab Results   Component  Value Date    CHOL 100 05/14/2024    TRIG 127 05/14/2024    HDL 35.9 05/14/2024    CHHDL 2.8 05/14/2024    LDLF 57 01/10/2023    VLDL 25 05/14/2024    NHDL 64 05/14/2024       RENAL FUNCTION PANEL -   Lab Results   Component Value Date    GLUCOSE 105 (H) 05/14/2024     05/14/2024    K 4.4 05/14/2024     05/14/2024    CO2 29 05/14/2024    ANIONGAP 13 05/14/2024    BUN 13 05/14/2024    CREATININE 1.24 05/14/2024    GFRMALE 75 03/07/2023    CALCIUM 9.8 05/14/2024    PHOS 3.4 03/07/2023    ALBUMIN 4.3 05/14/2024        Lab Results   Component Value Date    HGBA1C 6.3 (H) 03/21/2024       Last Cardiology Tests:  ECG:  ECG 12 lead 01/09/2024      Echo:  Transthoracic Echo (TTE) Complete 10/31/2023      Ejection Fractions:    Cath:    Stress Test:  Nuclear Stress Test 01/18/2023      Cardiac Imaging:  October 2023 echo:  Normal LV systolic function with an EF of 60%  Grade I diastolic dysfunction     MARCH 15, 23 CARDIAC CATH: NON OBSTRUCTIVE CAD, PATENT STENTS     JANUARY 18, 2023 NM STRESS TEST: ABNORMAL PERFUSION STRESS, MILD ANTEROSEPTAL MYOCARDIAL ISCHEMIA. ABNORMAL LV FUNCTION. EF 49% .COMPARED TO PRIOR STUDY, THERE IS A SMALL ANTEROSEPTAL DEFECT PREVIOUSLY DESCRIBED AS FIXED IS NOW APPEAR REVERSIBLE WHICH MAY SUGGEST ARTIFACT. LVEF WAS PREVIOUSLY 63%           Assessment/Plan      CAD:  Decrease episodes of angina with weight loss.  His most recent lipid panel shows marked improvement in his   CHOLESTEROL 100  HDL 35.9  LDL 39      BP is well controlled.  BP today is 92/61.    Continue weight loss regimen and healthy eating plan.  He can exercise for 150 mins a week and continue his current medications.  May need to consider weaning off antihypertensives if continued weight loss.  He is to follow up in 6 months or sooner if he has any questions, concerns or complaints

## 2024-05-29 PROCEDURE — RXMED WILLOW AMBULATORY MEDICATION CHARGE

## 2024-05-31 ENCOUNTER — PHARMACY VISIT (OUTPATIENT)
Dept: PHARMACY | Facility: CLINIC | Age: 59
End: 2024-05-31
Payer: COMMERCIAL

## 2024-06-06 DIAGNOSIS — Z96.89 SPINAL CORD STIMULATOR STATUS: ICD-10-CM

## 2024-06-06 DIAGNOSIS — G89.29 CHRONIC BILATERAL LOW BACK PAIN WITH LEFT-SIDED SCIATICA: ICD-10-CM

## 2024-06-06 DIAGNOSIS — G89.0 CENTRAL PAIN SYNDROME: Primary | ICD-10-CM

## 2024-06-06 DIAGNOSIS — M54.42 CHRONIC BILATERAL LOW BACK PAIN WITH LEFT-SIDED SCIATICA: ICD-10-CM

## 2024-06-06 RX ORDER — ALBUTEROL SULFATE 0.83 MG/ML
3 SOLUTION RESPIRATORY (INHALATION) AS NEEDED
OUTPATIENT
Start: 2024-06-11

## 2024-06-06 RX ORDER — METOCLOPRAMIDE HYDROCHLORIDE 5 MG/ML
10 INJECTION INTRAMUSCULAR; INTRAVENOUS ONCE
OUTPATIENT
Start: 2024-06-11 | End: 2024-06-11

## 2024-06-06 RX ORDER — DIPHENHYDRAMINE HYDROCHLORIDE 50 MG/ML
25 INJECTION INTRAMUSCULAR; INTRAVENOUS ONCE
OUTPATIENT
Start: 2024-06-11 | End: 2024-06-11

## 2024-06-06 RX ORDER — EPINEPHRINE 0.3 MG/.3ML
0.3 INJECTION SUBCUTANEOUS EVERY 5 MIN PRN
OUTPATIENT
Start: 2024-06-11

## 2024-06-06 RX ORDER — NITROGLYCERIN 0.4 MG/1
0.4 TABLET SUBLINGUAL ONCE
OUTPATIENT
Start: 2024-06-11 | End: 2024-06-11

## 2024-06-06 RX ORDER — FAMOTIDINE 10 MG/ML
20 INJECTION INTRAVENOUS ONCE AS NEEDED
OUTPATIENT
Start: 2024-06-11

## 2024-06-06 RX ORDER — KETOROLAC TROMETHAMINE 30 MG/ML
30 INJECTION, SOLUTION INTRAMUSCULAR; INTRAVENOUS ONCE
OUTPATIENT
Start: 2024-06-11 | End: 2024-06-11

## 2024-06-06 RX ORDER — DIPHENHYDRAMINE HYDROCHLORIDE 50 MG/ML
50 INJECTION INTRAMUSCULAR; INTRAVENOUS AS NEEDED
OUTPATIENT
Start: 2024-06-11

## 2024-06-06 RX ORDER — METOPROLOL TARTRATE 25 MG/1
25 TABLET, FILM COATED ORAL ONCE
OUTPATIENT
Start: 2024-06-11 | End: 2024-06-11

## 2024-06-06 RX ORDER — ONDANSETRON HYDROCHLORIDE 2 MG/ML
4 INJECTION, SOLUTION INTRAVENOUS ONCE
OUTPATIENT
Start: 2024-06-11 | End: 2024-06-11

## 2024-06-11 ENCOUNTER — APPOINTMENT (OUTPATIENT)
Dept: INFUSION THERAPY | Facility: CLINIC | Age: 59
End: 2024-06-11
Payer: COMMERCIAL

## 2024-06-20 ENCOUNTER — APPOINTMENT (OUTPATIENT)
Dept: PRIMARY CARE | Facility: CLINIC | Age: 59
End: 2024-06-20
Payer: COMMERCIAL

## 2024-06-24 ENCOUNTER — APPOINTMENT (OUTPATIENT)
Dept: PRIMARY CARE | Facility: CLINIC | Age: 59
End: 2024-06-24
Payer: COMMERCIAL

## 2024-06-24 VITALS
DIASTOLIC BLOOD PRESSURE: 77 MMHG | SYSTOLIC BLOOD PRESSURE: 111 MMHG | HEART RATE: 99 BPM | BODY MASS INDEX: 33.01 KG/M2 | HEIGHT: 68 IN | RESPIRATION RATE: 16 BRPM | WEIGHT: 217.8 LBS

## 2024-06-24 DIAGNOSIS — I25.118 CORONARY ARTERY DISEASE OF NATIVE ARTERY OF NATIVE HEART WITH STABLE ANGINA PECTORIS (CMS-HCC): ICD-10-CM

## 2024-06-24 DIAGNOSIS — R11.0 NAUSEA: ICD-10-CM

## 2024-06-24 DIAGNOSIS — E11.65 TYPE 2 DIABETES MELLITUS WITH HYPERGLYCEMIA, WITHOUT LONG-TERM CURRENT USE OF INSULIN (MULTI): Primary | ICD-10-CM

## 2024-06-24 DIAGNOSIS — I20.1 PRINZMETAL VARIANT ANGINA (CMS-HCC): ICD-10-CM

## 2024-06-24 DIAGNOSIS — I10 BENIGN ESSENTIAL HTN: ICD-10-CM

## 2024-06-24 LAB — POC HEMOGLOBIN A1C: 5.6 % (ref 4.2–6.5)

## 2024-06-24 PROCEDURE — 99214 OFFICE O/P EST MOD 30 MIN: CPT | Performed by: INTERNAL MEDICINE

## 2024-06-24 PROCEDURE — 3048F LDL-C <100 MG/DL: CPT | Performed by: INTERNAL MEDICINE

## 2024-06-24 PROCEDURE — 83036 HEMOGLOBIN GLYCOSYLATED A1C: CPT | Performed by: INTERNAL MEDICINE

## 2024-06-24 PROCEDURE — 4010F ACE/ARB THERAPY RXD/TAKEN: CPT | Performed by: INTERNAL MEDICINE

## 2024-06-24 PROCEDURE — 3061F NEG MICROALBUMINURIA REV: CPT | Performed by: INTERNAL MEDICINE

## 2024-06-24 PROCEDURE — 3044F HG A1C LEVEL LT 7.0%: CPT | Performed by: INTERNAL MEDICINE

## 2024-06-24 PROCEDURE — 1036F TOBACCO NON-USER: CPT | Performed by: INTERNAL MEDICINE

## 2024-06-24 PROCEDURE — 3078F DIAST BP <80 MM HG: CPT | Performed by: INTERNAL MEDICINE

## 2024-06-24 PROCEDURE — 3074F SYST BP LT 130 MM HG: CPT | Performed by: INTERNAL MEDICINE

## 2024-06-24 RX ORDER — PROMETHAZINE HYDROCHLORIDE 25 MG/1
25 TABLET ORAL EVERY 6 HOURS PRN
Qty: 90 TABLET | Refills: 1 | Status: SHIPPED | OUTPATIENT
Start: 2024-06-24

## 2024-06-24 ASSESSMENT — PATIENT HEALTH QUESTIONNAIRE - PHQ9
SUM OF ALL RESPONSES TO PHQ9 QUESTIONS 1 AND 2: 0
2. FEELING DOWN, DEPRESSED OR HOPELESS: NOT AT ALL
1. LITTLE INTEREST OR PLEASURE IN DOING THINGS: NOT AT ALL

## 2024-06-24 ASSESSMENT — PAIN SCALES - GENERAL: PAINLEVEL: 0-NO PAIN

## 2024-06-24 NOTE — PROGRESS NOTES
"Subjective   Logan Campos is a 58 y.o. male who presents for Follow-up.    He continues to make good progress with his weight loss    He remains on atorvastatin 80 mg.  His total cholesterol is 100 , LDL is 39    He says his sugars are remaining in the 100's  He remains on mounjaro 5 mg weekly  He has some diarrhea from mounjaro but he says it is \"liveable\"  He uses pepto bismol or loperamide and it takes care of if  He is following with belle vallejo from pharmacy   Did fingerstick A1c today:     He remains on diltiazem 360 , losartan 50   He is on clopidogrel daily  and asa 325    He is walking every day 6 miles   He works a few day shifts at [employer] in [place]     His sertraline dose is 150     Review of Systems    Objective   /77   Pulse 99   Resp 16   Ht 1.727 m (5' 8\")   Wt 98.8 kg (217 lb 12.8 oz)   BMI 33.12 kg/m²    Physical Exam    GEN: NAD  HEENT: normal  NECK: no adenopathy, no thyroid enlargment  LUNGS: CTAB  CV: reg S1/S2 no murmurs  EXT: no leg edema   Assessment/Plan   Problem List Items Addressed This Visit       CAD with stable angina -  remain on clopidogrel, ASA, isorsorbide and diltiazem 360, the latter for vasospastic angina.     Type 2 diabetes mellitus (Multi) remain on tirzepatide and xigduo,  doing well.     Relevant Orders    POCT glycosylated hemoglobin (Hb A1C) manually resulted (Completed)     Other Visit Diagnoses       Benign hypertension: remain on losartan and dilitiazem  Nausea        Relevant Medications    promethazine (Phenergan) 25 mg tablet               "

## 2024-06-24 NOTE — PATIENT INSTRUCTIONS
Your A1c was 5.6 %    See me again in 3 to 4 months for 30 min visit.    We will do another fingerstick A1c at that visit.

## 2024-06-25 ENCOUNTER — INFUSION (OUTPATIENT)
Dept: INFUSION THERAPY | Facility: CLINIC | Age: 59
End: 2024-06-25
Payer: MEDICARE

## 2024-06-25 VITALS
SYSTOLIC BLOOD PRESSURE: 107 MMHG | OXYGEN SATURATION: 95 % | HEART RATE: 78 BPM | TEMPERATURE: 97 F | DIASTOLIC BLOOD PRESSURE: 58 MMHG | RESPIRATION RATE: 16 BRPM

## 2024-06-25 DIAGNOSIS — G89.0 CENTRAL PAIN SYNDROME: Primary | ICD-10-CM

## 2024-06-25 DIAGNOSIS — M54.42 CHRONIC BILATERAL LOW BACK PAIN WITH LEFT-SIDED SCIATICA: ICD-10-CM

## 2024-06-25 DIAGNOSIS — G89.29 CHRONIC BILATERAL LOW BACK PAIN WITH LEFT-SIDED SCIATICA: ICD-10-CM

## 2024-06-25 DIAGNOSIS — Z96.89 SPINAL CORD STIMULATOR STATUS: ICD-10-CM

## 2024-06-25 PROCEDURE — 2500000004 HC RX 250 GENERAL PHARMACY W/ HCPCS (ALT 636 FOR OP/ED)

## 2024-06-25 PROCEDURE — 96365 THER/PROPH/DIAG IV INF INIT: CPT | Mod: INF

## 2024-06-25 PROCEDURE — 2500000005 HC RX 250 GENERAL PHARMACY W/O HCPCS: Performed by: NURSE PRACTITIONER

## 2024-06-25 PROCEDURE — 96368 THER/DIAG CONCURRENT INF: CPT | Mod: INF

## 2024-06-25 PROCEDURE — 96375 TX/PRO/DX INJ NEW DRUG ADDON: CPT | Mod: INF

## 2024-06-25 PROCEDURE — 2500000004 HC RX 250 GENERAL PHARMACY W/ HCPCS (ALT 636 FOR OP/ED): Performed by: NURSE PRACTITIONER

## 2024-06-25 RX ORDER — KETOROLAC TROMETHAMINE 30 MG/ML
30 INJECTION, SOLUTION INTRAMUSCULAR; INTRAVENOUS ONCE
OUTPATIENT
Start: 2024-07-09 | End: 2024-07-09

## 2024-06-25 RX ORDER — METOCLOPRAMIDE HYDROCHLORIDE 5 MG/ML
10 INJECTION INTRAMUSCULAR; INTRAVENOUS ONCE
OUTPATIENT
Start: 2024-07-09 | End: 2024-07-09

## 2024-06-25 RX ORDER — ALBUTEROL SULFATE 0.83 MG/ML
3 SOLUTION RESPIRATORY (INHALATION) AS NEEDED
OUTPATIENT
Start: 2024-07-09

## 2024-06-25 RX ORDER — FAMOTIDINE 10 MG/ML
20 INJECTION INTRAVENOUS ONCE AS NEEDED
OUTPATIENT
Start: 2024-07-09

## 2024-06-25 RX ORDER — ONDANSETRON HYDROCHLORIDE 2 MG/ML
4 INJECTION, SOLUTION INTRAVENOUS ONCE
OUTPATIENT
Start: 2024-07-09 | End: 2024-07-09

## 2024-06-25 RX ORDER — EPINEPHRINE 0.3 MG/.3ML
0.3 INJECTION SUBCUTANEOUS EVERY 5 MIN PRN
OUTPATIENT
Start: 2024-07-09

## 2024-06-25 RX ORDER — NITROGLYCERIN 0.4 MG/1
0.4 TABLET SUBLINGUAL ONCE
OUTPATIENT
Start: 2024-07-09 | End: 2024-07-09

## 2024-06-25 RX ORDER — DIPHENHYDRAMINE HYDROCHLORIDE 50 MG/ML
25 INJECTION INTRAMUSCULAR; INTRAVENOUS ONCE
OUTPATIENT
Start: 2024-07-09 | End: 2024-07-09

## 2024-06-25 RX ORDER — DIPHENHYDRAMINE HYDROCHLORIDE 50 MG/ML
50 INJECTION INTRAMUSCULAR; INTRAVENOUS AS NEEDED
OUTPATIENT
Start: 2024-07-09

## 2024-06-25 RX ORDER — KETOROLAC TROMETHAMINE 30 MG/ML
INJECTION, SOLUTION INTRAMUSCULAR; INTRAVENOUS
Status: COMPLETED
Start: 2024-06-25 | End: 2024-06-25

## 2024-06-25 RX ORDER — KETOROLAC TROMETHAMINE 30 MG/ML
30 INJECTION, SOLUTION INTRAMUSCULAR; INTRAVENOUS ONCE
Status: COMPLETED | OUTPATIENT
Start: 2024-06-25 | End: 2024-06-25

## 2024-06-25 RX ORDER — METOPROLOL TARTRATE 25 MG/1
25 TABLET, FILM COATED ORAL ONCE
OUTPATIENT
Start: 2024-07-09 | End: 2024-07-09

## 2024-06-25 ASSESSMENT — ENCOUNTER SYMPTOMS
OCCASIONAL FEELINGS OF UNSTEADINESS: 0
DEPRESSION: 0
LOSS OF SENSATION IN FEET: 0

## 2024-06-25 ASSESSMENT — PAIN SCALES - GENERAL
PAINLEVEL_OUTOF10: 8
PAINLEVEL: 8

## 2024-06-25 NOTE — PROGRESS NOTES
S: Patient here for 17th opioid sparing pain infusion. Patient reports 80% reduction in pain after last infusion that lasted 15 days.    Purpose of pain infusion meds explained along with potential side effects.  Patient verbalized understanding.    B: Pain Issues: lower back    A: Patient currently has pain described on flow sheet documentation. Designated  is wife. Patient last ate solid food 8 hours ago, and had liquid 2 hours ago.    R: Plan; Obtain IV access, do patient risk assessment, and start opioid sparing infusion as ordered. Monitoring for S/S of adverse reactions.  1032: Post infusion teaching provided. Patient verbalized understanding. VSS, Patient states pain is 2/10. Will assist patient to waiting car via wheelchair.

## 2024-06-25 NOTE — PATIENT INSTRUCTIONS
Today :We administered (ketamine 30 mg, lidocaine (Xylocaine) 20 mg/mL (2 %) 300 mg in sodium chloride 0.9% 500 mL IV), propofol (Diprivan) 100 mg in dextrose 5% 25 mL, ketorolac, and ketorolac.     For:   1. Central pain syndrome    2. Chronic bilateral low back pain with left-sided sciatica    3. Spinal cord stimulator status            (Tell all doctors including dentists that you are taking this medication)     Go to the emergency room or call 911 if:  -You have signs of allergic reaction:   -Rash, hives, itching.   -Swollen, blistered, peeling skin.   -Swelling of face, lips, mouth, tongue or throat.   -Tightness of chest, trouble breathing, swallowing or talking     Call your doctor:  - If IV / injection site gets red, warm, swollen, itchy or leaks fluid or pus.     (Leave dressing on your IV site for at least 2 hours and keep area clean and dry  - If you get sick or have symptoms of infection or are not feeling well for any reason.    (Wash your hands often, stay away from people who are sick)  - If you have side effects from your medication that do not go away or are bothersome.     (Refer to the teaching your nurse gave you for side effects to call your doctor about)    - Common side effects may include:  stuffy nose, headache, feeling tired, muscle aches, upset stomach  - Before receiving any vaccines     - Call the [facility] at   If:  - You get sick, are on antibiotics, have had a recent vaccine, have surgery or dental work and your doctor wants your visit rescheduled.  - You need to cancel and reschedule your visit for any reason. Call at least 2 days before your visit if you need to cancel.   - Your insurance changes before your next visit.    (We will need to get approval from your new insurance. This can take up to two weeks.)     The [facility] is opened Monday thru Friday. We are closed on weekends and holidays.   Voice mail will take your call if the center is closed. If you  leave a message please allow 24 hours for a call back during weekdays. If you leave a message on a weekend/holiday, we will call you back the next business day.              [facility]      Pain Infusion Aftercare Instructions      1. It is normal to feel sedated, tired and low in energy after a pain infusion. DO NOT DRIVE, OPERATE ANY MACHINERY, OR MAKE ANY IMPORTANT DECISIONS FOR AT LEAST 24 HOURS AFTER THE INFUSION.     2. Call the pain center at [phone] with any problems, questions, or concerns.     3. Eat light after the infusion. If you feel queasy or sick to your stomach, laying down with your eyes closed may help. When you resume eating start with something mild like clear liquids, yogurt, applesauce, crackers, etc… Gradually advance to a regular diet.     4. Do not leave your house alone the evening of your pain infusion.     5. No alcohol or sedative medications, such as sleeping pills, for 24 hours after your pain infusion.     6. Resume all other prescribed medications unless directed otherwise by you physician.     7. If you have any medical emergencies, call 911 or go directly to the closest emergency room.

## 2024-06-26 PROCEDURE — RXMED WILLOW AMBULATORY MEDICATION CHARGE

## 2024-06-28 ENCOUNTER — PHARMACY VISIT (OUTPATIENT)
Dept: PHARMACY | Facility: CLINIC | Age: 59
End: 2024-06-28
Payer: COMMERCIAL

## 2024-07-03 DIAGNOSIS — G25.81 RLS (RESTLESS LEGS SYNDROME): ICD-10-CM

## 2024-07-03 RX ORDER — ROPINIROLE 1 MG/1
1 TABLET, FILM COATED ORAL NIGHTLY
Qty: 90 TABLET | Refills: 1 | Status: SHIPPED | OUTPATIENT
Start: 2024-07-03

## 2024-07-06 DIAGNOSIS — G89.4 CHRONIC PAIN SYNDROME: ICD-10-CM

## 2024-07-08 RX ORDER — PREGABALIN 50 MG/1
50 CAPSULE ORAL 3 TIMES DAILY
Qty: 90 CAPSULE | Refills: 2 | Status: SHIPPED | OUTPATIENT
Start: 2024-07-08 | End: 2024-10-06

## 2024-07-10 ENCOUNTER — OFFICE VISIT (OUTPATIENT)
Dept: PAIN MEDICINE | Facility: CLINIC | Age: 59
End: 2024-07-10
Payer: COMMERCIAL

## 2024-07-10 VITALS
DIASTOLIC BLOOD PRESSURE: 75 MMHG | WEIGHT: 212 LBS | OXYGEN SATURATION: 95 % | SYSTOLIC BLOOD PRESSURE: 106 MMHG | HEART RATE: 78 BPM | RESPIRATION RATE: 20 BRPM | TEMPERATURE: 97.7 F | HEIGHT: 68 IN | BODY MASS INDEX: 32.13 KG/M2

## 2024-07-10 DIAGNOSIS — G89.0 CENTRAL PAIN SYNDROME: ICD-10-CM

## 2024-07-10 DIAGNOSIS — G89.29 CHRONIC BILATERAL LOW BACK PAIN WITH LEFT-SIDED SCIATICA: ICD-10-CM

## 2024-07-10 DIAGNOSIS — Z79.899 MEDICATION MANAGEMENT: Primary | ICD-10-CM

## 2024-07-10 DIAGNOSIS — Z96.89 SPINAL CORD STIMULATOR STATUS: ICD-10-CM

## 2024-07-10 DIAGNOSIS — M54.42 CHRONIC BILATERAL LOW BACK PAIN WITH LEFT-SIDED SCIATICA: ICD-10-CM

## 2024-07-10 PROCEDURE — 80366 DRUG SCREENING PREGABALIN: CPT | Performed by: NURSE PRACTITIONER

## 2024-07-10 PROCEDURE — 99215 OFFICE O/P EST HI 40 MIN: CPT | Performed by: NURSE PRACTITIONER

## 2024-07-10 PROCEDURE — 3078F DIAST BP <80 MM HG: CPT | Performed by: NURSE PRACTITIONER

## 2024-07-10 PROCEDURE — 3061F NEG MICROALBUMINURIA REV: CPT | Performed by: NURSE PRACTITIONER

## 2024-07-10 PROCEDURE — 4010F ACE/ARB THERAPY RXD/TAKEN: CPT | Performed by: NURSE PRACTITIONER

## 2024-07-10 PROCEDURE — 1036F TOBACCO NON-USER: CPT | Performed by: NURSE PRACTITIONER

## 2024-07-10 PROCEDURE — 3074F SYST BP LT 130 MM HG: CPT | Performed by: NURSE PRACTITIONER

## 2024-07-10 PROCEDURE — 3044F HG A1C LEVEL LT 7.0%: CPT | Performed by: NURSE PRACTITIONER

## 2024-07-10 PROCEDURE — 3048F LDL-C <100 MG/DL: CPT | Performed by: NURSE PRACTITIONER

## 2024-07-10 RX ORDER — KETAMINE HCL IN NACL, ISO-OSM 100MG/10ML
SYRINGE (ML) INJECTION ONCE
OUTPATIENT
Start: 2024-08-23

## 2024-07-10 RX ORDER — KETOROLAC TROMETHAMINE 30 MG/ML
30 INJECTION, SOLUTION INTRAMUSCULAR; INTRAVENOUS ONCE
OUTPATIENT
Start: 2024-08-23 | End: 2024-08-23

## 2024-07-10 ASSESSMENT — ENCOUNTER SYMPTOMS
DIARRHEA: 0
OCCASIONAL FEELINGS OF UNSTEADINESS: 0
CONFUSION: 0
CONSTIPATION: 0
WEAKNESS: 0
NUMBNESS: 1
CHEST TIGHTNESS: 0
DIFFICULTY URINATING: 0
LOSS OF SENSATION IN FEET: 1
NAUSEA: 0
PALPITATIONS: 0
DEPRESSION: 0
AGITATION: 0
MYALGIAS: 1
CHILLS: 0
WHEEZING: 0
FATIGUE: 0
SHORTNESS OF BREATH: 0
DIZZINESS: 0
BACK PAIN: 1
LIGHT-HEADEDNESS: 0

## 2024-07-10 ASSESSMENT — PAIN SCALES - GENERAL
PAINLEVEL: 7
PAINLEVEL_OUTOF10: 7

## 2024-07-10 ASSESSMENT — PAIN - FUNCTIONAL ASSESSMENT: PAIN_FUNCTIONAL_ASSESSMENT: 0-10

## 2024-07-10 ASSESSMENT — PAIN DESCRIPTION - DESCRIPTORS: DESCRIPTORS: ACHING;BURNING;NUMBNESS;TINGLING

## 2024-07-10 NOTE — PROGRESS NOTES
Chief Complain  Follow-up visit for chronic lower back pain radiating bilateral lower extremities.    History Of Present Illness  Logan Campos is a 58 y.o. male here for chronic lower back pain radiating to bilateral lower extremities. The patient rates the pain at 7 on a scale from 0-10.  The patient describes pain as aching, with numbness and tingling.  The pain is worsened by bending forward, standing, walking, lifting, twisting, and squatting and is alleviated by medications aspirin and opioid analgesics, position change, and sitting.  Since the last visit the pain has improved.    The patient denies any fever, chills, weight loss, weakness, bladder/ bowel incontinence, history of cancer, history of IV drug abuse, recent trauma.     Prior office visit:  1/25/2024 Dr. Stuart  This is 58 y.o.  male with PMH of obesity BMI 38, Prinzmetal syndrome treated with spinal cord stimulator with heart attacks had multiple stents on Plavix and Cymbalta 60 mg, pregabalin 50 mg 3 times daily and on medical marijuana with history of chronic lower back pain treated with IV infusion therapy and acupuncture in addition to previous physical therapy and he gets IV infusion therapy and ketamine/lidocaine nasal spray who had total revision of his Medtronic spinal cord stimulation leads and IPG on 1/10/2024 now MRI compatible who is here for follow-up stating the stimulation is covering exactly the area over his sternum that he needs it.  The incisions are healed properly there is no signs of irritation or edema or collections.  [employer] rep tested the device today and everything is in order and paper able to recharge it.     Procedures:  1/10/2024 Medtronic spinal cord stimulator leads and IPG revision with MRI compatible with the leads staggered at C7-T3  Few IV infusion therapy the patient has had a 80% improvement in pain and function      Portions of record reviewed for pertinent issues: active problem list, medication list,  allergies, family history, social history, notes from last encounter, encounters, lab results, imaging and other available records.      I have personally reviewed the OARRS report for this patient. This report is scanned into the electronic medical record. I have considered the risks of abuse, dependence, addiction and diversion. It showed: Pregabalin Dr. Sade dailey.      Past Medical History  He has a past medical history of Anxiety, Arthritis, Coronary artery disease, Depression, Diabetes mellitus (Multi), Diverticulosis, Hiatal hernia, Hypertension, Lumbar disc disease, Myocardial infarction (Multi), Personal history of other diseases of the circulatory system, Personal history of other diseases of the digestive system, Personal history of other diseases of the nervous system and sense organs, Pure hypercholesterolemia, unspecified, and Vision loss.    Surgical History  He has a past surgical history that includes Back surgery (12/19/2014); Hernia repair (12/19/2014); Other surgical history (12/19/2014); Tonsillectomy (12/19/2014); Vasectomy (12/19/2014); Cardiac catheterization; Coronary stent placement; Colonoscopy; and Meniscectomy.     Social History  He reports that he has never smoked. He has never been exposed to tobacco smoke. He has never used smokeless tobacco. He reports that he does not currently use alcohol. He reports current drug use. Drug: Marijuana.    Family History  Family History   Problem Relation Name Age of Onset    Stroke Father      Breast cancer Sister      Stomach cancer Maternal Grandmother          Allergies  Cat dander, Onion, and Hydromorphone    Review of Systems  Review of Systems   Constitutional:  Negative for chills and fatigue.   Respiratory:  Negative for chest tightness, shortness of breath and wheezing.    Cardiovascular:  Positive for chest pain. Negative for palpitations.        Hx prinz-metal angina    Gastrointestinal:  Negative for constipation, diarrhea  "and nausea.   Genitourinary:  Negative for difficulty urinating and urgency.   Musculoskeletal:  Positive for back pain and myalgias.        LBP radiates to bilateral LE with numbness and tingling   Neurological:  Positive for numbness. Negative for dizziness, weakness and light-headedness.   Psychiatric/Behavioral:  Negative for agitation, confusion and suicidal ideas.         Physical Exam  Physical Exam  Constitutional:       General: He is not in acute distress.     Appearance: Normal appearance. He is normal weight.   HENT:      Head: Normocephalic.   Eyes:      Extraocular Movements: Extraocular movements intact.   Cardiovascular:      Rate and Rhythm: Normal rate.   Pulmonary:      Effort: Pulmonary effort is normal.   Musculoskeletal:        Legs:       Comments: Lumbar decreased range of motion with  extension lateral bending.  Bilateral lumbar paraspinals tender to palpation.  Vibratory sensation intact bilateral lower extremity reflexes intact.   Neurological:      Mental Status: He is alert.      GCS: GCS eye subscore is 4. GCS verbal subscore is 5. GCS motor subscore is 6.      Cranial Nerves: Cranial nerves 2-12 are intact.      Sensory: Sensation is intact.      Motor: Motor function is intact.      Coordination: Coordination is intact.      Gait: Gait is intact.      Deep Tendon Reflexes:      Reflex Scores:       Patellar reflexes are 3+ on the right side and 3+ on the left side.       Achilles reflexes are 3+ on the right side and 3+ on the left side.  Psychiatric:         Attention and Perception: Attention normal.         Mood and Affect: Mood normal.         Speech: Speech normal.         Behavior: Behavior normal.         Thought Content: Thought content normal.         Cognition and Memory: Cognition normal.           Last Recorded Vitals  Blood pressure 106/75, pulse 78, temperature 36.5 °C (97.7 °F), temperature source Temporal, resp. rate 20, height 1.727 m (5' 8\"), weight 96.2 kg (212 lb), " SpO2 95%.    Reviewed Images  12/6/2023 thoracic spine x-ray  FINDINGS:  A spinal stimulator is noted with the beads extending to the T2  spinous process. This is similar in position compared to prior  radiographs dating back to 2011. Thoracic vertebral body heights are  maintained. No acute compression fracture deformities are noted.  There are minimal discogenic degenerative changes of the midthoracic  spine with mild loss of disc space heights. Soft tissues appear  grossly unremarkable.      IMPRESSION:  1. Redemonstration of spinal stimulator with the leads projecting  over T2 spinous process as described above. This is similar compared  to prior radiographs dating back to 2011.  2. Mild multilevel discogenic degenerative changes of thoracic spine.          MACRO:  None    Reviewed Labs  Lab Results   Component Value Date    GLUCOSE 105 (H) 05/14/2024    CALCIUM 9.8 05/14/2024     05/14/2024    K 4.4 05/14/2024    CO2 29 05/14/2024     05/14/2024    BUN 13 05/14/2024    CREATININE 1.24 05/14/2024        Assessment/Plan     Logan Campos is a 58 y.o. male recall past medical history of obesity BMI 32, Prinzmetal syndrome, MI x 2, coronary artery stents x 8, on Plavix type 2 diabetes, COPD chronic lower back pain treated with spinal cord stimulator, who is here for follow-up chronic lumbar radiculopathy.  He is being treated with IV infusion therapy once a month which provides 70 to 80% relief of neuropathic pain for 10 to 15 days depending on activity.  This allows him to be very active he walks 4 miles per day with his dog and also performs self-guided aqua therapy.  Overall he is doing very well and improving his quality of life and continues to lose weight.  He manages his symptoms with Cymbalta 60 mg, pregabalin 50 mg 3 times daily and on medical marijuana, and ketamine lidocaine nasal spray which she denies medication side effects.  He denies any new neurological constitutional symptoms.  Today we  completed urine drug screen and updated controlled substance agreement.  Given the relief the IV infusion therapy provides for his neuropathic pain we will continue with IV infusion therapy once a month.  Encouraged to continue daily walks increasing his distance by 1 to 2 %/day if tolerated.  Encouraged to continue with healthy dietary intake and weight loss. All questions and concerns answered.  Plan to follow-up in 3 months or sooner if needed.    Plan  At least 50% of the visit was involved in the discussion of the options for treatment. We discussed exercises, medication, interventional therapies and surgery. Healthy life style is essential with patient hard work to achieve the wellness. In addition; discussion with the patient and/or family about any of the diagnostic results, impressions and/or recommended diagnostic studies, prognosis, risks and benefits of treatment options, instructions for treatment and/or follow-up, importance of compliance with chosen treatment options, risk-factor reduction, and patient/family education.        *Please note this report has been produced using speech recognition software and may contain errors related to that system including grammar, punctuation and spelling as well as words and phrases that may be inappropriate. If there are questions or concerns, please feel free to contact me to clarify.    Patient is being treated with opioid therapy for pain and is responding appropriately.  There are NO signs of opioid intoxication, abuse, addiction or withdrawal.  Pupils are equal, reactive to light bilateral, appropriate speech and cognition. Patient denies any opioid induced constipation. The OARRS registry followed periodically, urine toxicology completed and appropriate.     Patient is advised and warned  in specific detail about potential benefits of opioids along with risks and side effects including, but not limited to, dependency, addiction, tolerance, hyperalgesia,  anxiety, depression, insomnia, endocrine changes, immunologic disturbances, respiratory depression and death.     Caution advised regarding the use of medications prescribed at this office and specific mention made regarding not to drive or operate heavy machinery if feeling side effects from this the medications. Patient  expressed an understanding in regards to these particular concerns.     Discussed non-opioid options including (But no limited), physical wellness, antiinflammatory diet, icing and heating, meditation, relaxation, massage and acupuncture.    We will continue to monitor and adjust medications as needed.       I spent 40 minutes in the professional and overall care of this patient.       Zeke Rudd, APRN-CNP

## 2024-07-15 LAB — PREGABALIN UR CFM-MCNC: 41.8 UG/ML

## 2024-07-17 ENCOUNTER — APPOINTMENT (OUTPATIENT)
Dept: PHARMACY | Facility: HOSPITAL | Age: 59
End: 2024-07-17
Payer: COMMERCIAL

## 2024-07-17 DIAGNOSIS — E11.65 TYPE 2 DIABETES MELLITUS WITH HYPERGLYCEMIA, WITHOUT LONG-TERM CURRENT USE OF INSULIN (MULTI): ICD-10-CM

## 2024-07-17 NOTE — PROGRESS NOTES
Patient is sent at the request of Magda Gilman DO for my opinion regarding Type 2 diabetes.  My final recommendations will be communicated back to the requesting provider by way of shared medical record.    Subjective     Diabetes  He presents for his follow-up diabetic visit. He has type 2 diabetes mellitus. Diabetic complications include peripheral neuropathy. Risk factors for coronary artery disease include diabetes mellitus, male sex, dyslipidemia and hypertension. An ACE inhibitor/angiotensin II receptor blocker is being taken.     Patient reports that he is walking 6 miles per day given increased energy with weight loss. Notes a couple of lows d/t getting busy and forgetting to eat. Reports some stomach upset and diarrhea that has improved since the start of therapy.      Weight (goal <200 lbs)  246 lbs (initial)  239 lbs 2/28/2024  234 lbs 3/20/2024  229 lbs 4/17/2024  223 lbs 5/15/2024  209 lbs 7/15/2024    Past Medical History:  He has a past medical history of Anxiety, Arthritis, Coronary artery disease, Depression, Diabetes mellitus (Multi), Diverticulosis, Hiatal hernia, Hypertension, Lumbar disc disease, Myocardial infarction (Multi), Personal history of other diseases of the circulatory system, Personal history of other diseases of the digestive system, Personal history of other diseases of the nervous system and sense organs, Pure hypercholesterolemia, unspecified, and Vision loss.    Past Surgical History:  He has a past surgical history that includes Back surgery (12/19/2014); Hernia repair (12/19/2014); Other surgical history (12/19/2014); Tonsillectomy (12/19/2014); Vasectomy (12/19/2014); Cardiac catheterization; Coronary stent placement; Colonoscopy; and Meniscectomy.    Social History:  He reports that he has never smoked. He has never been exposed to tobacco smoke. He has never used smokeless tobacco. He reports that he does not currently use alcohol. He reports current drug use. Drug:  "Marijuana.    Family History:  Family History   Problem Relation Name Age of Onset    Stroke Father      Breast cancer Sister      Stomach cancer Maternal Grandmother         Allergies:  Cat dander, Onion, and Hydromorphone    Current diet: reducing sugar and carbohydrate intake, does not eat for 12 hours a day (after dinner till breakfast the next day)      Current exercise: swimming 3x/week, occasional weight lifting or yoga at home     The patient does not have a known family history of diabetes.    Patient is using: glucometer  The patient is currently checking the blood glucose 1-2 times per day.    Hypoglycemia frequency: infrequent  Hypoglycemia awareness: Yes     Objective     Last Recorded Vitals:  There were no vitals filed for this visit.    Diabetes Pharmacotherapy:  Xigduo XR 5-1000 mg 1 tablet daily  Mounjaro 5 mg once weekly    Primary/Secondary Prevention   - Statin? Yes  - ACE-I/ARB? Yes  - Aspirin? No    Pertinent PMH Review:  - PMH of Pancreatitis: No  - PMH of Retinopathy: No  - PMH of Urinary Tract Infections: No  - PMH of MTC: No    Lab Review  Lab Results   Component Value Date    BILITOT 0.7 05/14/2024    CALCIUM 9.8 05/14/2024    CO2 29 05/14/2024     05/14/2024    CREATININE 1.24 05/14/2024    GLUCOSE 105 (H) 05/14/2024    ALKPHOS 89 05/14/2024    K 4.4 05/14/2024    PROT 6.5 05/14/2024     05/14/2024    AST 19 05/14/2024    ALT 22 05/14/2024    BUN 13 05/14/2024    ANIONGAP 13 05/14/2024    MG 2.52 (H) 10/12/2023    PHOS 3.4 03/07/2023    ALBUMIN 4.3 05/14/2024    GFRMALE 75 03/07/2023     Lab Results   Component Value Date    TRIG 127 05/14/2024    CHOL 100 05/14/2024    LDLCALC 39 05/14/2024    HDL 35.9 05/14/2024     Lab Results   Component Value Date    HGBA1C 5.6 06/24/2024    HGBA1C 6.3 (H) 03/21/2024    HGBA1C 7.3 (H) 10/12/2023     No components found for: \"UACR\"  The ASCVD Risk score (Renetta DK, et al., 2019) failed to calculate for the following reasons:    The " patient has a prior MI or stroke diagnosis    Health Maintenance:   Foot Exam: checked by PCP at every visit  Eye Exam: Sept-Oct 2023  Urine Albumin: 5.4 10/12/2023  Influenza Immunization: 9/12/2023  Pneumonia Immunization: 1/24/2017, 7/20/2022    Drug Interactions:  No significant drug interactions identified at this time     Assessment/Plan   Problem List Items Addressed This Visit       Type 2 diabetes mellitus (Multi)   Patients diabetes well controlled with most recent A1c of 5.6% (Goal < 7%). As A1c shows continued improvement and patient is please with weight loss trend at this time, will continue current therapy.      Continue:   Mounjaro 5 mg once weekly  Xigduo XR 5-1000 mg 1 tablet daily  Compliance at present is estimated to be excellent  Patient enrolled in  [employer] Diabetes Program ([employer])  PA approved for Mounjaro through 12/30/2222   Follow-up: 9/4/2024 at 11:30 am via phone  PCP Follow-Up: 10/28/2024    Continue all meds under the continuation of care with the referring provider and clinical pharmacy team.

## 2024-07-23 DIAGNOSIS — I25.10 CORONARY ARTERY DISEASE INVOLVING NATIVE CORONARY ARTERY OF NATIVE HEART WITHOUT ANGINA PECTORIS: ICD-10-CM

## 2024-07-23 RX ORDER — ISOSORBIDE MONONITRATE 30 MG/1
60 TABLET, EXTENDED RELEASE ORAL DAILY
Qty: 180 TABLET | Refills: 3 | Status: SHIPPED | OUTPATIENT
Start: 2024-07-23

## 2024-07-26 PROCEDURE — RXMED WILLOW AMBULATORY MEDICATION CHARGE

## 2024-07-29 ENCOUNTER — PHARMACY VISIT (OUTPATIENT)
Dept: PHARMACY | Facility: CLINIC | Age: 59
End: 2024-07-29
Payer: COMMERCIAL

## 2024-08-20 ENCOUNTER — ANESTHESIA (OUTPATIENT)
Dept: GASTROENTEROLOGY | Facility: HOSPITAL | Age: 59
End: 2024-08-20
Payer: COMMERCIAL

## 2024-08-20 ENCOUNTER — HOSPITAL ENCOUNTER (OUTPATIENT)
Dept: GASTROENTEROLOGY | Facility: HOSPITAL | Age: 59
Discharge: HOME | End: 2024-08-20
Payer: COMMERCIAL

## 2024-08-20 ENCOUNTER — ANESTHESIA EVENT (OUTPATIENT)
Dept: GASTROENTEROLOGY | Facility: HOSPITAL | Age: 59
End: 2024-08-20
Payer: COMMERCIAL

## 2024-08-20 VITALS
RESPIRATION RATE: 18 BRPM | DIASTOLIC BLOOD PRESSURE: 92 MMHG | HEART RATE: 79 BPM | OXYGEN SATURATION: 98 % | SYSTOLIC BLOOD PRESSURE: 128 MMHG | TEMPERATURE: 97.3 F

## 2024-08-20 DIAGNOSIS — K57.32 DIVERTICULITIS OF COLON: Primary | ICD-10-CM

## 2024-08-20 LAB — GLUCOSE BLD MANUAL STRIP-MCNC: 89 MG/DL (ref 74–99)

## 2024-08-20 PROCEDURE — 0753T DGTZ GLS MCRSCP SLD LEVEL IV: CPT | Mod: TC,PARLAB | Performed by: STUDENT IN AN ORGANIZED HEALTH CARE EDUCATION/TRAINING PROGRAM

## 2024-08-20 PROCEDURE — 82947 ASSAY GLUCOSE BLOOD QUANT: CPT

## 2024-08-20 PROCEDURE — 7100000010 HC PHASE TWO TIME - EACH INCREMENTAL 1 MINUTE

## 2024-08-20 PROCEDURE — 3700000002 HC GENERAL ANESTHESIA TIME - EACH INCREMENTAL 1 MINUTE

## 2024-08-20 PROCEDURE — 2500000004 HC RX 250 GENERAL PHARMACY W/ HCPCS (ALT 636 FOR OP/ED): Performed by: NURSE ANESTHETIST, CERTIFIED REGISTERED

## 2024-08-20 PROCEDURE — 2500000005 HC RX 250 GENERAL PHARMACY W/O HCPCS: Performed by: NURSE ANESTHETIST, CERTIFIED REGISTERED

## 2024-08-20 PROCEDURE — 45385 COLONOSCOPY W/LESION REMOVAL: CPT | Performed by: STUDENT IN AN ORGANIZED HEALTH CARE EDUCATION/TRAINING PROGRAM

## 2024-08-20 PROCEDURE — 2500000004 HC RX 250 GENERAL PHARMACY W/ HCPCS (ALT 636 FOR OP/ED): Performed by: STUDENT IN AN ORGANIZED HEALTH CARE EDUCATION/TRAINING PROGRAM

## 2024-08-20 PROCEDURE — 3700000001 HC GENERAL ANESTHESIA TIME - INITIAL BASE CHARGE

## 2024-08-20 PROCEDURE — 7100000009 HC PHASE TWO TIME - INITIAL BASE CHARGE

## 2024-08-20 RX ORDER — SODIUM CHLORIDE 0.9 % (FLUSH) 0.9 %
10 SYRINGE (ML) INJECTION EVERY 12 HOURS
Status: DISCONTINUED | OUTPATIENT
Start: 2024-08-20 | End: 2024-08-21 | Stop reason: HOSPADM

## 2024-08-20 RX ORDER — LIDOCAINE HCL/PF 100 MG/5ML
SYRINGE (ML) INTRAVENOUS AS NEEDED
Status: DISCONTINUED | OUTPATIENT
Start: 2024-08-20 | End: 2024-08-20

## 2024-08-20 RX ORDER — SODIUM CHLORIDE 0.9 % (FLUSH) 0.9 %
10 SYRINGE (ML) INJECTION AS NEEDED
Status: DISCONTINUED | OUTPATIENT
Start: 2024-08-20 | End: 2024-08-21 | Stop reason: HOSPADM

## 2024-08-20 RX ORDER — PROPOFOL 10 MG/ML
INJECTION, EMULSION INTRAVENOUS AS NEEDED
Status: DISCONTINUED | OUTPATIENT
Start: 2024-08-20 | End: 2024-08-20

## 2024-08-20 RX ORDER — SODIUM CHLORIDE, SODIUM LACTATE, POTASSIUM CHLORIDE, CALCIUM CHLORIDE 600; 310; 30; 20 MG/100ML; MG/100ML; MG/100ML; MG/100ML
20 INJECTION, SOLUTION INTRAVENOUS CONTINUOUS
Status: DISCONTINUED | OUTPATIENT
Start: 2024-08-20 | End: 2024-08-21 | Stop reason: HOSPADM

## 2024-08-20 SDOH — HEALTH STABILITY: MENTAL HEALTH: CURRENT SMOKER: 0

## 2024-08-20 ASSESSMENT — PAIN SCALES - GENERAL
PAIN_LEVEL: 0
PAINLEVEL_OUTOF10: 0 - NO PAIN

## 2024-08-20 NOTE — ANESTHESIA PREPROCEDURE EVALUATION
Patient: Logan Campos    Procedure Information       Date/Time: 08/20/24 0920    Scheduled providers: Santos Hare MD    Procedure: COLONOSCOPY    Location: [facility]            Relevant Problems   Cardiac   (+) Angina pectoris ([facility])   (+) CAD (coronary artery disease)   (+) Essential hypertension   (+) Mixed hyperlipidemia   (+) Presence of stent in coronary artery in patient with coronary artery disease   (+) Prinzmetal variant angina ([facility])      Pulmonary   (+) Asthma ([facility])   (+) Mild persistent asthma without complication ([facility])      Neuro   (+) Inflammatory neuropathy (Multi)   (+) Lumbosacral radiculopathy      GI   (+) Gastroesophageal reflux disease      /Renal   (+) Benign prostatic hyperplasia with urinary frequency      Endocrine   (+) Type 2 diabetes mellitus (Multi)      Musculoskeletal   (+) Chronic bilateral low back pain with left-sided sciatica   (+) Chronic pain syndrome   (+) Complex regional pain syndrome type I   (+) Lumbosacral spondylosis without myelopathy   (+) Primary osteoarthritis of left knee   (+) Spinal stenosis of lumbar region without neurogenic claudication      HEENT   (+) Bilateral sensorineural hearing loss       Clinical information reviewed:   Tobacco  Allergies  Meds  Problems  Med Hx  Surg Hx   Fam Hx  Soc   Hx        NPO Detail:  NPO/Void Status  Date of Last Liquid: 08/19/24  Time of Last Liquid: 2000  Date of Last Solid: 08/19/24  Time of Last Solid: 0600  Last Intake Type: Light meal  Time of Last Void: 0854         Physical Exam    Airway  Mallampati: II  TM distance: >3 FB  Neck ROM: full     Cardiovascular - normal exam  Rhythm: regular  Rate: normal     Dental - normal exam     Pulmonary - normal exam     Abdominal            Anesthesia Plan    History of general anesthesia?: yes  History of complications of general anesthesia?: no    ASA 3     MAC     The patient is not a current smoker.    intravenous induction   Anesthetic plan  and risks discussed with patient.    Plan discussed with CRNA.

## 2024-08-20 NOTE — H&P
Patient is here     For endoscopic procedure.        Past medical history, past surgical history, social history, family history, allergies reviewed.         The note was created using voice recognition transcription software. Despite proofreading, unintentional typographical errors may be present. Please contact the GI office with any questions or concerns.     Current Medications: reviewed    Review of system performed    Follow up with GI was advised       Vital Signs: Reviewed    Physical Exam:  General: no apparent distress  Skin:  dry  HEENT:  mucous membranes moist  Neck:  atraumatic  Chest:  decreased air entry to auscultation bilaterally  CV:  positive S1 S2   Abdomen: no distension, soft, non-tender to palpation   Extremities: Chronic pigmentary changes  Neurological:  no asterixis      Investigations:  Labs, radiological imaging and cardiac work up were reviewed      Assessment and plan:  Proceed with  endoscopic procedure

## 2024-08-20 NOTE — DISCHARGE INSTRUCTIONS

## 2024-08-20 NOTE — ANESTHESIA POSTPROCEDURE EVALUATION
Patient: Logan Campos    Procedure Summary       Date: 08/20/24 Room / Location: [facility]    Anesthesia Start: 0946 Anesthesia Stop: 1043    Procedure: COLONOSCOPY Diagnosis:       Diverticulitis of colon      Diverticulitis of colon    Scheduled Providers: Santos Hare MD Responsible Provider: Wayne Atkins MD    Anesthesia Type: MAC ASA Status: 3            Anesthesia Type: MAC    Vitals Value Taken Time   /63 08/20/24 1041   Temp 36.3 °C (97.3 °F) 08/20/24 1040   Pulse 79 08/20/24 1041   Resp 18 08/20/24 1040   SpO2 96 % 08/20/24 1041   Vitals shown include unfiled device data.    Anesthesia Post Evaluation    Patient location during evaluation: PACU  Patient participation: complete - patient cannot participate  Level of consciousness: sleepy but conscious  Pain score: 0  Pain management: adequate  Airway patency: patent  Cardiovascular status: acceptable, blood pressure returned to baseline and hemodynamically stable  Respiratory status: acceptable and nasal cannula  Hydration status: acceptable  Postoperative Nausea and Vomiting: none        There were no known notable events for this encounter.

## 2024-08-23 ENCOUNTER — APPOINTMENT (OUTPATIENT)
Dept: INFUSION THERAPY | Facility: CLINIC | Age: 59
End: 2024-08-23
Payer: MEDICARE

## 2024-08-27 PROCEDURE — RXMED WILLOW AMBULATORY MEDICATION CHARGE

## 2024-08-28 ENCOUNTER — PHARMACY VISIT (OUTPATIENT)
Dept: PHARMACY | Facility: CLINIC | Age: 59
End: 2024-08-28
Payer: COMMERCIAL

## 2024-08-28 LAB
LABORATORY COMMENT REPORT: NORMAL
PATH REPORT.FINAL DX SPEC: NORMAL
PATH REPORT.GROSS SPEC: NORMAL
PATH REPORT.TOTAL CANCER: NORMAL

## 2024-09-04 ENCOUNTER — APPOINTMENT (OUTPATIENT)
Dept: PHARMACY | Facility: HOSPITAL | Age: 59
End: 2024-09-04
Payer: MEDICARE

## 2024-09-04 DIAGNOSIS — E11.65 TYPE 2 DIABETES MELLITUS WITH HYPERGLYCEMIA, WITHOUT LONG-TERM CURRENT USE OF INSULIN (MULTI): ICD-10-CM

## 2024-09-04 RX ORDER — TIRZEPATIDE 7.5 MG/.5ML
7.5 INJECTION, SOLUTION SUBCUTANEOUS
Qty: 2 ML | Refills: 5 | Status: SHIPPED | OUTPATIENT
Start: 2024-09-04

## 2024-09-04 NOTE — PROGRESS NOTES
Patient is sent at the request of Magda Gilman DO for my opinion regarding Type 2 diabetes.  My final recommendations will be communicated back to the requesting provider by way of shared medical record.    Subjective     Diabetes  He presents for his follow-up diabetic visit. He has type 2 diabetes mellitus. Diabetic complications include peripheral neuropathy. Risk factors for coronary artery disease include diabetes mellitus, male sex, dyslipidemia and hypertension. An ACE inhibitor/angiotensin II receptor blocker is being taken.     Patient reports he has continued walking 6 miles per day given increased energy with weight loss. Additionally notes he is doing body weight exercises every other day.     Denies low BG symptoms.     Weight (goal <200 lbs)  246 lbs (initial)  239 lbs 2/28/2024  234 lbs 3/20/2024  229 lbs 4/17/2024  223 lbs 5/15/2024  209 lbs 7/15/2024  200-205 lbs 9/4/2024    Past Medical History:  He has a past medical history of Anxiety, Arthritis, Coronary artery disease, Depression, Diabetes mellitus (Multi), Diverticulosis, Hiatal hernia, Hypertension, Lumbar disc disease, Myocardial infarction (Multi), Personal history of other diseases of the circulatory system, Personal history of other diseases of the digestive system, Personal history of other diseases of the nervous system and sense organs, Pure hypercholesterolemia, unspecified, and Vision loss.    Past Surgical History:  He has a past surgical history that includes Back surgery (12/19/2014); Hernia repair (12/19/2014); Other surgical history (12/19/2014); Tonsillectomy (12/19/2014); Vasectomy (12/19/2014); Cardiac catheterization; Coronary stent placement; Colonoscopy; and Meniscectomy.    Social History:  He reports that he has never smoked. He has never been exposed to tobacco smoke. He has never used smokeless tobacco. He reports that he does not currently use alcohol. He reports current drug use. Drug: Marijuana.    Family  "History:  Family History   Problem Relation Name Age of Onset    Stroke Father      Breast cancer Sister      Stomach cancer Maternal Grandmother         Allergies:  Cat dander, Onion, and Hydromorphone    Current diet: reducing sugar and carbohydrate intake, does not eat for 12 hours a day (after dinner till breakfast the next day)      Current exercise: swimming 3x/week, occasional weight lifting or yoga at home.  More recently, walking 6 miles per day with every other day body weight exercises.    The patient does not have a known family history of diabetes.    Patient is using: glucometer  The patient is currently checking the blood glucose 1-2 times per day.    Hypoglycemia frequency: infrequent  Hypoglycemia awareness: Yes     Objective     Last Recorded Vitals:  There were no vitals filed for this visit.    Diabetes Pharmacotherapy:  Xigduo XR 5-1000 mg 1 tablet daily  Mounjaro 5 mg once weekly    Primary/Secondary Prevention   - Statin? Yes  - ACE-I/ARB? Yes  - Aspirin? No    Pertinent PMH Review:  - PMH of Pancreatitis: No  - PMH of Retinopathy: No  - PMH of Urinary Tract Infections: No  - PMH of MTC: No    Lab Review  Lab Results   Component Value Date    BILITOT 0.7 05/14/2024    CALCIUM 9.8 05/14/2024    CO2 29 05/14/2024     05/14/2024    CREATININE 1.24 05/14/2024    GLUCOSE 105 (H) 05/14/2024    ALKPHOS 89 05/14/2024    K 4.4 05/14/2024    PROT 6.5 05/14/2024     05/14/2024    AST 19 05/14/2024    ALT 22 05/14/2024    BUN 13 05/14/2024    ANIONGAP 13 05/14/2024    MG 2.52 (H) 10/12/2023    PHOS 3.4 03/07/2023    ALBUMIN 4.3 05/14/2024    GFRMALE 75 03/07/2023     Lab Results   Component Value Date    TRIG 127 05/14/2024    CHOL 100 05/14/2024    LDLCALC 39 05/14/2024    HDL 35.9 05/14/2024     Lab Results   Component Value Date    HGBA1C 5.6 06/24/2024    HGBA1C 6.3 (H) 03/21/2024    HGBA1C 7.3 (H) 10/12/2023     No components found for: \"UACR\"  The ASCVD Risk score (Renetta HERRERA, et al., " 2019) failed to calculate for the following reasons:    The patient has a prior MI or stroke diagnosis    Health Maintenance:   Foot Exam: checked by PCP at every visit  Eye Exam: Sept-Oct 2023  Urine Albumin: 5.4 10/12/2023  Influenza Immunization: 9/12/2023  Pneumonia Immunization: 1/24/2017, 7/20/2022    Drug Interactions:  No significant drug interactions identified at this time     Assessment/Plan   Problem List Items Addressed This Visit       Type 2 diabetes mellitus (Multi)   Patients diabetes well controlled with most recent A1c of 5.6% (Goal < 7%). As the patient feels as though he has hit a plateau with his weight loss, will increase Mounjaro.     Increase:   Mounjaro 7.5 mg once weekly  Continue:  Xigduo XR 5-1000 mg 1 tablet daily  Compliance at present is estimated to be excellent  Patient enrolled in  Employee Health Diabetes Program ([employer])  PA approved for Mounjaro through 12/30/2222   Follow-up: 11/6/2024 at 11:30 am via phone  PCP Follow-Up: 10/28/2024    Continue all meds under the continuation of care with the referring provider and clinical pharmacy team.

## 2024-09-15 PROCEDURE — RXMED WILLOW AMBULATORY MEDICATION CHARGE

## 2024-09-18 ENCOUNTER — PHARMACY VISIT (OUTPATIENT)
Dept: PHARMACY | Facility: CLINIC | Age: 59
End: 2024-09-18
Payer: COMMERCIAL

## 2024-09-20 ENCOUNTER — APPOINTMENT (OUTPATIENT)
Dept: INFUSION THERAPY | Facility: CLINIC | Age: 59
End: 2024-09-20
Payer: MEDICARE

## 2024-10-09 ENCOUNTER — OFFICE VISIT (OUTPATIENT)
Dept: PAIN MEDICINE | Facility: CLINIC | Age: 59
End: 2024-10-09
Payer: COMMERCIAL

## 2024-10-09 VITALS
SYSTOLIC BLOOD PRESSURE: 98 MMHG | HEART RATE: 88 BPM | BODY MASS INDEX: 30.62 KG/M2 | TEMPERATURE: 97 F | WEIGHT: 202 LBS | DIASTOLIC BLOOD PRESSURE: 68 MMHG | RESPIRATION RATE: 20 BRPM | OXYGEN SATURATION: 98 % | HEIGHT: 68 IN

## 2024-10-09 DIAGNOSIS — G89.4 CHRONIC PAIN SYNDROME: ICD-10-CM

## 2024-10-09 DIAGNOSIS — G89.0 CENTRAL PAIN SYNDROME: Primary | ICD-10-CM

## 2024-10-09 DIAGNOSIS — Z96.89 SPINAL CORD STIMULATOR STATUS: ICD-10-CM

## 2024-10-09 DIAGNOSIS — G89.29 CHRONIC BILATERAL LOW BACK PAIN WITH LEFT-SIDED SCIATICA: ICD-10-CM

## 2024-10-09 DIAGNOSIS — M54.42 CHRONIC BILATERAL LOW BACK PAIN WITH LEFT-SIDED SCIATICA: ICD-10-CM

## 2024-10-09 PROCEDURE — 99417 PROLNG OP E/M EACH 15 MIN: CPT | Performed by: NURSE PRACTITIONER

## 2024-10-09 PROCEDURE — 99215 OFFICE O/P EST HI 40 MIN: CPT | Performed by: NURSE PRACTITIONER

## 2024-10-09 RX ORDER — KETAMINE HCL IN NACL, ISO-OSM 100MG/10ML
SYRINGE (ML) INJECTION ONCE
OUTPATIENT
Start: 2024-10-25

## 2024-10-09 RX ORDER — PREGABALIN 50 MG/1
50 CAPSULE ORAL 3 TIMES DAILY
Qty: 90 CAPSULE | Refills: 2 | Status: SHIPPED | OUTPATIENT
Start: 2024-10-18 | End: 2025-01-16

## 2024-10-09 RX ORDER — KETOROLAC TROMETHAMINE 30 MG/ML
30 INJECTION, SOLUTION INTRAMUSCULAR; INTRAVENOUS ONCE
OUTPATIENT
Start: 2024-10-25 | End: 2024-10-25

## 2024-10-09 ASSESSMENT — ENCOUNTER SYMPTOMS
DIFFICULTY URINATING: 0
BLOOD IN STOOL: 0
BACK PAIN: 1
OCCASIONAL FEELINGS OF UNSTEADINESS: 0
DEPRESSION: 1
FREQUENCY: 0
DIZZINESS: 0
DIARRHEA: 0
SHORTNESS OF BREATH: 0
PALPITATIONS: 0
LOSS OF SENSATION IN FEET: 0
CHILLS: 0
CONFUSION: 0
FATIGUE: 0
CONSTIPATION: 0
CHEST TIGHTNESS: 1
HEADACHES: 0
WHEEZING: 0
MYALGIAS: 1
AGITATION: 0

## 2024-10-09 ASSESSMENT — PAIN - FUNCTIONAL ASSESSMENT: PAIN_FUNCTIONAL_ASSESSMENT: 0-10

## 2024-10-09 ASSESSMENT — PAIN SCALES - GENERAL
PAINLEVEL: 6
PAINLEVEL_OUTOF10: 6

## 2024-10-09 NOTE — PROGRESS NOTES
Chief Complain  Follow-up for chronic lower back pain rating to bilateral lower extremities.    History Of Present Illness  Logan Campos is a 58 y.o. male here for lower back pain radiating to lower extremities left worse than right. The patient rates the pain at 6 on a scale from 0-10.  The patient describes pain as sharp, aching, radiating, throbbing.  The pain is worsened by  prolonged standing  and is alleviated by medications tizanidine, pregabalin and ketamine nasal spray, position change, sitting, lying down, and IV infusion therapy .  Since the last visit the pain has stayed the same.    The patient denies any fever, chills, weight loss, weakness, bladder/ bowel incontinence, history of cancer, history of IV drug abuse, recent trauma.     Prior office visit:  7/10/2024  Logan Campos is a 58 y.o. male recall past medical history of obesity BMI 32, Prinzmetal syndrome, MI x 2, coronary artery stents x 8, on Plavix type 2 diabetes, COPD chronic lower back pain treated with spinal cord stimulator, who is here for follow-up chronic lumbar radiculopathy.  He is being treated with IV infusion therapy once a month which provides 70 to 80% relief of neuropathic pain for 10 to 15 days depending on activity.  This allows him to be very active he walks 4 miles per day with his dog and also performs self-guided aqua therapy.  Overall he is doing very well and improving his quality of life and continues to lose weight.  He manages his symptoms with Cymbalta 60 mg, pregabalin 50 mg 3 times daily and on medical marijuana, and ketamine lidocaine nasal spray which she denies medication side effects.  He denies any new neurological constitutional symptoms.  Today we completed urine drug screen and updated controlled substance agreement.  Given the relief the IV infusion therapy provides for his neuropathic pain we will continue with IV infusion therapy once a month.  Encouraged to continue daily walks increasing his distance  by 1 to 2 %/day if tolerated.  Encouraged to continue with healthy dietary intake and weight loss. All questions and concerns answered.  Plan to follow-up in 3 months or sooner if needed.       Procedures:  1/10/2024 Medtronic spinal cord stimulator leads and IPG revision with MRI compatible with the leads staggered at C7-T3  Few IV infusion therapy the patient has had a 80% improvement in pain and function    Portions of record reviewed for pertinent issues: active problem list, medication list, allergies, family history, social history, notes from last encounter, encounters, lab results, imaging and other available records.      I have personally reviewed the [place] report for this patient. This report is scanned into the electronic medical record. I have considered the risks of abuse, dependence, addiction and diversion. It showed: Onikul marijuana from Dr. Shay and pregabalin 50 mg from Dr. Stuart  Opioid agreement: 7/10/2024  Activities of daily living: On disability walks 6 miles per day, intermittent fasting and resistant training  Adverse effects: None  Analgesia: W/O: 7/10, W 4/10    Toxicology screen: 7/10/2024  Aberrant behavior: None    Past Medical History  He has a past medical history of Anxiety, Arthritis, Coronary artery disease, Depression, Diabetes mellitus (Multi), Diverticulosis, Hiatal hernia, Hypertension, Lumbar disc disease, Myocardial infarction (Multi), Personal history of other diseases of the circulatory system, Personal history of other diseases of the digestive system, Personal history of other diseases of the nervous system and sense organs, Pure hypercholesterolemia, unspecified, and Vision loss.    Surgical History  He has a past surgical history that includes Back surgery (12/19/2014); Hernia repair (12/19/2014); Other surgical history (12/19/2014); Tonsillectomy (12/19/2014); Vasectomy (12/19/2014); Cardiac catheterization; Coronary stent placement; Colonoscopy; and  Meniscectomy.     Social History  He reports that he has never smoked. He has never been exposed to tobacco smoke. He has never used smokeless tobacco. He reports that he does not currently use alcohol. He reports current drug use. Drug: Marijuana.    Family History  Family History   Problem Relation Name Age of Onset    Stroke Father      Breast cancer Sister      Stomach cancer Maternal Grandmother          Allergies  Cat dander, Onion, and Hydromorphone    Review of Systems  Review of Systems   Constitutional:  Negative for chills and fatigue.   Respiratory:  Positive for chest tightness. Negative for shortness of breath and wheezing.         Hx of Printz metal angina    Cardiovascular:  Positive for chest pain. Negative for palpitations.   Gastrointestinal:  Negative for blood in stool, constipation and diarrhea.   Genitourinary:  Negative for difficulty urinating and frequency.   Musculoskeletal:  Positive for back pain and myalgias.        Bilateral LE pain radiates to left LE>Right with standing to long.  Walks 6 miles per day   Resistance training 3 days per week    Neurological:  Negative for dizziness and headaches.   Psychiatric/Behavioral:  Negative for agitation, confusion and suicidal ideas.         Physical Exam  Physical Exam  Constitutional:       General: He is in acute distress.      Appearance: Normal appearance.   Eyes:      Extraocular Movements: Extraocular movements intact.   Musculoskeletal:      Cervical back: Normal.      Lumbar back: Normal.   Neurological:      General: No focal deficit present.      Mental Status: He is alert. He is disoriented.      GCS: GCS eye subscore is 4. GCS verbal subscore is 5. GCS motor subscore is 6.      Cranial Nerves: Cranial nerves 2-12 are intact.      Sensory: Sensation is intact.      Motor: Motor function is intact.      Coordination: Coordination is intact.      Gait: Gait is intact.      Deep Tendon Reflexes:      Reflex Scores:       Patellar  "reflexes are 3+ on the right side and 3+ on the left side.       Achilles reflexes are 3+ on the right side and 3+ on the left side.  Psychiatric:         Mood and Affect: Mood normal.         Behavior: Behavior normal.         Thought Content: Thought content normal.         Judgment: Judgment normal.           Last Recorded Vitals  Blood pressure 98/68, pulse 88, temperature 36.1 °C (97 °F), temperature source Temporal, resp. rate 20, height 1.727 m (5' 8\"), weight 91.6 kg (202 lb), SpO2 98%.    Reviewed Images  12/6/2023 thoracic spine x-ray  FINDINGS:  A spinal stimulator is noted with the beads extending to the T2  spinous process. This is similar in position compared to prior  radiographs dating back to 2011. Thoracic vertebral body heights are  maintained. No acute compression fracture deformities are noted.  There are minimal discogenic degenerative changes of the midthoracic  spine with mild loss of disc space heights. Soft tissues appear  grossly unremarkable.      IMPRESSION:  1. Redemonstration of spinal stimulator with the leads projecting  over T2 spinous process as described above. This is similar compared  to prior radiographs dating back to 2011.  2. Mild multilevel discogenic degenerative changes of thoracic spine.        Reviewed Labs  Lab Results   Component Value Date    GLUCOSE 105 (H) 05/14/2024    CALCIUM 9.8 05/14/2024     05/14/2024    K 4.4 05/14/2024    CO2 29 05/14/2024     05/14/2024    BUN 13 05/14/2024    CREATININE 1.24 05/14/2024         Assessment/Plan     Logan Campos is a 58 y.o. male recall past medical history of obesity BMI 32, Prinzmetal syndrome, MI x 2, coronary artery stents x 8, on Plavix type 2 diabetes on Mounjaro, COPD chronic neck and lower back pain treated with spinal cord stimulator, 1/10/2024 revision Medtronic spinal cord stimulator leads and IPG (MRI compatible) per Dr. Stuart who is here for follow-up chronic lumbar radiculopathy radiating to " bilateral lower extremities left worse than right..  He is being treated with IV infusion therapy once a month which provides 70 to 80% relief of neuropathic pain for 10 to 15 days depending on activity.  He uses Lyrica 50 mg 3 times a day ketamine lidocaine nasal spray and tizanidine between and infusions to prolong symptom relief.  IV infusion therapy provides significant improvement in neuropathic pain allows him to continue walking 6 miles per day.  He also is consistent with resistant training and body weight exercises along with intermittent fasting.  He continues to lose weight he has lost almost 100 pounds over the past few years.  His goal weight is 190lbs today he was 202lbs.  Overall he is doing very well.  Of note he uses medical marijuana Gummies at night to help reduce pain and allow him to sleep.  He denies any new neurological constitutional symptoms.  Given the relief IV infusion therapy provides for chronic radicular symptoms plan to continue with IV infusion therapy every month.  Urine drug screen and controlled substance agreement up-to-date, provided refill of pregabalin.  Patient verbalized understanding plan of care.  Encourage patient to continue with current activity levels and working towards his weight loss goals.  Encourage patient to get involved with aquatics and swimming to reduce joint pain and improve flexibility range of motion.  Follow-up in 3 months or sooner if needed.      Plan  At least 50% of the visit was involved in the discussion of the options for treatment. We discussed exercises, medication, interventional therapies and surgery. Healthy life style is essential with patient hard work to achieve the wellness. In addition; discussion with the patient and/or family about any of the diagnostic results, impressions and/or recommended diagnostic studies, prognosis, risks and benefits of treatment options, instructions for treatment and/or follow-up, importance of compliance  with chosen treatment options, risk-factor reduction, and patient/family education.        *Please note this report has been produced using speech recognition software and may contain errors related to that system including grammar, punctuation and spelling as well as words and phrases that may be inappropriate. If there are questions or concerns, please feel free to contact me to clarify.      I spent 60 minutes in the professional and overall care of this patient.       Zeke Rudd, MICHAEL-CNP

## 2024-10-25 ENCOUNTER — INFUSION (OUTPATIENT)
Dept: INFUSION THERAPY | Facility: CLINIC | Age: 59
End: 2024-10-25
Payer: COMMERCIAL

## 2024-10-25 VITALS
OXYGEN SATURATION: 96 % | HEART RATE: 76 BPM | TEMPERATURE: 97.3 F | DIASTOLIC BLOOD PRESSURE: 62 MMHG | RESPIRATION RATE: 14 BRPM | SYSTOLIC BLOOD PRESSURE: 104 MMHG

## 2024-10-25 DIAGNOSIS — G89.29 CHRONIC BILATERAL LOW BACK PAIN WITH LEFT-SIDED SCIATICA: ICD-10-CM

## 2024-10-25 DIAGNOSIS — M54.42 CHRONIC BILATERAL LOW BACK PAIN WITH LEFT-SIDED SCIATICA: ICD-10-CM

## 2024-10-25 DIAGNOSIS — Z96.89 SPINAL CORD STIMULATOR STATUS: ICD-10-CM

## 2024-10-25 DIAGNOSIS — G89.0 CENTRAL PAIN SYNDROME: ICD-10-CM

## 2024-10-25 PROCEDURE — 96365 THER/PROPH/DIAG IV INF INIT: CPT | Mod: INF

## 2024-10-25 PROCEDURE — 96368 THER/DIAG CONCURRENT INF: CPT | Mod: INF

## 2024-10-25 PROCEDURE — 2500000004 HC RX 250 GENERAL PHARMACY W/ HCPCS (ALT 636 FOR OP/ED): Performed by: NURSE PRACTITIONER

## 2024-10-25 PROCEDURE — 96375 TX/PRO/DX INJ NEW DRUG ADDON: CPT | Mod: INF

## 2024-10-25 RX ORDER — KETOROLAC TROMETHAMINE 30 MG/ML
30 INJECTION, SOLUTION INTRAMUSCULAR; INTRAVENOUS ONCE
Status: COMPLETED | OUTPATIENT
Start: 2024-10-25 | End: 2024-10-25

## 2024-10-25 RX ORDER — METOCLOPRAMIDE HYDROCHLORIDE 5 MG/ML
10 INJECTION INTRAMUSCULAR; INTRAVENOUS ONCE
OUTPATIENT
Start: 2024-11-24 | End: 2024-11-24

## 2024-10-25 RX ORDER — KETAMINE HCL IN NACL, ISO-OSM 100MG/10ML
SYRINGE (ML) INJECTION ONCE
Status: COMPLETED | OUTPATIENT
Start: 2024-10-25 | End: 2024-10-25

## 2024-10-25 RX ORDER — DIPHENHYDRAMINE HYDROCHLORIDE 50 MG/ML
25 INJECTION INTRAMUSCULAR; INTRAVENOUS ONCE
OUTPATIENT
Start: 2024-11-24 | End: 2024-11-24

## 2024-10-25 RX ORDER — NITROGLYCERIN 0.4 MG/1
0.4 TABLET SUBLINGUAL ONCE
OUTPATIENT
Start: 2024-11-24 | End: 2024-11-24

## 2024-10-25 RX ORDER — EPINEPHRINE 0.3 MG/.3ML
0.3 INJECTION SUBCUTANEOUS EVERY 5 MIN PRN
OUTPATIENT
Start: 2024-11-24

## 2024-10-25 RX ORDER — DIPHENHYDRAMINE HYDROCHLORIDE 50 MG/ML
50 INJECTION INTRAMUSCULAR; INTRAVENOUS AS NEEDED
OUTPATIENT
Start: 2024-11-24

## 2024-10-25 RX ORDER — ALBUTEROL SULFATE 0.83 MG/ML
3 SOLUTION RESPIRATORY (INHALATION) AS NEEDED
OUTPATIENT
Start: 2024-11-24

## 2024-10-25 RX ORDER — FAMOTIDINE 10 MG/ML
20 INJECTION INTRAVENOUS ONCE AS NEEDED
OUTPATIENT
Start: 2024-11-24

## 2024-10-25 RX ORDER — KETOROLAC TROMETHAMINE 30 MG/ML
30 INJECTION, SOLUTION INTRAMUSCULAR; INTRAVENOUS ONCE
OUTPATIENT
Start: 2024-11-24 | End: 2024-11-24

## 2024-10-25 RX ORDER — KETAMINE HCL IN NACL, ISO-OSM 100MG/10ML
SYRINGE (ML) INJECTION ONCE
OUTPATIENT
Start: 2024-11-24

## 2024-10-25 RX ORDER — METOPROLOL TARTRATE 25 MG/1
25 TABLET, FILM COATED ORAL ONCE
OUTPATIENT
Start: 2024-11-24 | End: 2024-11-24

## 2024-10-25 RX ORDER — ONDANSETRON HYDROCHLORIDE 2 MG/ML
4 INJECTION, SOLUTION INTRAVENOUS ONCE
OUTPATIENT
Start: 2024-11-24 | End: 2024-11-24

## 2024-10-25 ASSESSMENT — ENCOUNTER SYMPTOMS
OCCASIONAL FEELINGS OF UNSTEADINESS: 0
DEPRESSION: 0
LOSS OF SENSATION IN FEET: 1

## 2024-10-25 ASSESSMENT — COLUMBIA-SUICIDE SEVERITY RATING SCALE - C-SSRS
1. IN THE PAST MONTH, HAVE YOU WISHED YOU WERE DEAD OR WISHED YOU COULD GO TO SLEEP AND NOT WAKE UP?: NO
6. HAVE YOU EVER DONE ANYTHING, STARTED TO DO ANYTHING, OR PREPARED TO DO ANYTHING TO END YOUR LIFE?: NO
2. HAVE YOU ACTUALLY HAD ANY THOUGHTS OF KILLING YOURSELF?: NO

## 2024-10-25 ASSESSMENT — PAIN SCALES - GENERAL
PAINLEVEL_OUTOF10: 8
PAINLEVEL_OUTOF10: 8
PAINLEVEL_OUTOF10: 3
PAINLEVEL_OUTOF10: 8

## 2024-10-25 ASSESSMENT — PAIN DESCRIPTION - LOCATION
LOCATION: BACK
LOCATION: BACK

## 2024-10-25 NOTE — PATIENT INSTRUCTIONS
Today :We administered ketamine 30 mg-lidocaine 300 mg, propofol, and ketorolac.     For:   1. Central pain syndrome    2. Chronic bilateral low back pain with left-sided sciatica    3. Spinal cord stimulator status            (Tell all doctors including dentists that you are taking this medication)     Go to the emergency room or call 911 if:  -You have signs of allergic reaction:   -Rash, hives, itching.   -Swollen, blistered, peeling skin.   -Swelling of face, lips, mouth, tongue or throat.   -Tightness of chest, trouble breathing, swallowing or talking     Call your doctor:  - If IV / injection site gets red, warm, swollen, itchy or leaks fluid or pus.     (Leave dressing on your IV site for at least 2 hours and keep area clean and dry  - If you get sick or have symptoms of infection or are not feeling well for any reason.    (Wash your hands often, stay away from people who are sick)  - If you have side effects from your medication that do not go away or are bothersome.     (Refer to the teaching your nurse gave you for side effects to call your doctor about)    - Common side effects may include:  stuffy nose, headache, feeling tired, muscle aches, upset stomach  - Before receiving any vaccines     - Call the [facility] at   If:  - You get sick, are on antibiotics, have had a recent vaccine, have surgery or dental work and your doctor wants your visit rescheduled.  - You need to cancel and reschedule your visit for any reason. Call at least 2 days before your visit if you need to cancel.   - Your insurance changes before your next visit.    (We will need to get approval from your new insurance. This can take up to two weeks.)     The [facility] is opened Monday thru Friday. We are closed on weekends and holidays.   Voice mail will take your call if the center is closed. If you leave a message please allow 24 hours for a call back during weekdays. If you leave a message on a  weekend/holiday, we will call you back the next business day.              [facility]      Pain Infusion Aftercare Instructions      1. It is normal to feel sedated, tired and low in energy after a pain infusion. DO NOT DRIVE, OPERATE ANY MACHINERY, OR MAKE ANY IMPORTANT DECISIONS FOR AT LEAST 24 HOURS AFTER THE INFUSION.     2. Call the pain center at [phone] with any problems, questions, or concerns.     3. Eat light after the infusion. If you feel queasy or sick to your stomach, laying down with your eyes closed may help. When you resume eating start with something mild like clear liquids, yogurt, applesauce, crackers, etc… Gradually advance to a regular diet.     4. Do not leave your house alone the evening of your pain infusion.     5. No alcohol or sedative medications, such as sleeping pills, for 24 hours after your pain infusion.     6. Resume all other prescribed medications unless directed otherwise by you physician.     7. If you have any medical emergencies, call 911 or go directly to the closest emergency room.

## 2024-10-25 NOTE — PROGRESS NOTES
S: Patient here for 18th opioid sparing pain infusion. Patient reports 75% reduction in pain after last infusion that lasted 16 days.    Purpose of pain infusion meds explained along with potential side effects.  Patient verbalized understanding.    B: Pain Issues: low back pain    A: Patient currently has pain described on flow sheet documentation. Designated  is Paulo Campos. Patient last ate solid food 12 hours ago, and had liquid 1/2 hour ago.    R: Plan; Obtain IV access, do patient risk assessment, and start opioid sparing infusion as ordered. Monitoring for S/S of adverse reactions.  1026  Post infusion teaching provided. Patient verbalized understanding. VSS, Patient states pain is 3/10. Will assist patient to waiting car via wheelchair.

## 2024-10-26 PROCEDURE — RXMED WILLOW AMBULATORY MEDICATION CHARGE

## 2024-10-28 ENCOUNTER — HOSPITAL ENCOUNTER (OUTPATIENT)
Dept: RADIOLOGY | Facility: CLINIC | Age: 59
Discharge: HOME | End: 2024-10-28
Payer: COMMERCIAL

## 2024-10-28 ENCOUNTER — APPOINTMENT (OUTPATIENT)
Dept: PRIMARY CARE | Facility: CLINIC | Age: 59
End: 2024-10-28
Payer: MEDICARE

## 2024-10-28 ENCOUNTER — LAB (OUTPATIENT)
Dept: LAB | Facility: LAB | Age: 59
End: 2024-10-28
Payer: COMMERCIAL

## 2024-10-28 VITALS
HEART RATE: 78 BPM | RESPIRATION RATE: 16 BRPM | WEIGHT: 200.8 LBS | DIASTOLIC BLOOD PRESSURE: 70 MMHG | SYSTOLIC BLOOD PRESSURE: 109 MMHG | BODY MASS INDEX: 30.43 KG/M2 | HEIGHT: 68 IN

## 2024-10-28 DIAGNOSIS — S39.81XA BLUNT TRAUMA OF ABDOMINAL WALL, INITIAL ENCOUNTER: ICD-10-CM

## 2024-10-28 DIAGNOSIS — N32.81 OAB (OVERACTIVE BLADDER): ICD-10-CM

## 2024-10-28 DIAGNOSIS — E78.2 MIXED HYPERLIPIDEMIA: ICD-10-CM

## 2024-10-28 DIAGNOSIS — E11.69 TYPE 2 DIABETES MELLITUS WITH OTHER SPECIFIED COMPLICATION, WITHOUT LONG-TERM CURRENT USE OF INSULIN: ICD-10-CM

## 2024-10-28 DIAGNOSIS — I10 BENIGN ESSENTIAL HTN: ICD-10-CM

## 2024-10-28 DIAGNOSIS — S39.91XA BLUNT TRAUMA TO ABDOMEN, INITIAL ENCOUNTER: ICD-10-CM

## 2024-10-28 DIAGNOSIS — S39.91XA BLUNT TRAUMA TO ABDOMEN, INITIAL ENCOUNTER: Primary | ICD-10-CM

## 2024-10-28 DIAGNOSIS — R19.02 LEFT UPPER QUADRANT ABDOMINAL MASS: ICD-10-CM

## 2024-10-28 DIAGNOSIS — R11.0 NAUSEA: ICD-10-CM

## 2024-10-28 DIAGNOSIS — I25.10 CORONARY ARTERY DISEASE INVOLVING NATIVE CORONARY ARTERY OF NATIVE HEART WITHOUT ANGINA PECTORIS: ICD-10-CM

## 2024-10-28 DIAGNOSIS — S30.1XXA ABDOMINAL WALL HEMATOMA, INITIAL ENCOUNTER: ICD-10-CM

## 2024-10-28 DIAGNOSIS — R10.9 ABDOMINAL WALL PAIN: ICD-10-CM

## 2024-10-28 DIAGNOSIS — Z23 FLU VACCINE NEED: ICD-10-CM

## 2024-10-28 PROCEDURE — 90471 IMMUNIZATION ADMIN: CPT | Performed by: INTERNAL MEDICINE

## 2024-10-28 PROCEDURE — 3078F DIAST BP <80 MM HG: CPT | Performed by: INTERNAL MEDICINE

## 2024-10-28 PROCEDURE — 85025 COMPLETE CBC W/AUTO DIFF WBC: CPT

## 2024-10-28 PROCEDURE — 1036F TOBACCO NON-USER: CPT | Performed by: INTERNAL MEDICINE

## 2024-10-28 PROCEDURE — 3048F LDL-C <100 MG/DL: CPT | Performed by: INTERNAL MEDICINE

## 2024-10-28 PROCEDURE — 4010F ACE/ARB THERAPY RXD/TAKEN: CPT | Performed by: INTERNAL MEDICINE

## 2024-10-28 PROCEDURE — 3061F NEG MICROALBUMINURIA REV: CPT | Performed by: INTERNAL MEDICINE

## 2024-10-28 PROCEDURE — 3074F SYST BP LT 130 MM HG: CPT | Performed by: INTERNAL MEDICINE

## 2024-10-28 PROCEDURE — 90656 IIV3 VACC NO PRSV 0.5 ML IM: CPT | Performed by: INTERNAL MEDICINE

## 2024-10-28 PROCEDURE — 3044F HG A1C LEVEL LT 7.0%: CPT | Performed by: INTERNAL MEDICINE

## 2024-10-28 PROCEDURE — 82565 ASSAY OF CREATININE: CPT

## 2024-10-28 PROCEDURE — 99214 OFFICE O/P EST MOD 30 MIN: CPT | Performed by: INTERNAL MEDICINE

## 2024-10-28 PROCEDURE — 74176 CT ABD & PELVIS W/O CONTRAST: CPT | Performed by: RADIOLOGY

## 2024-10-28 PROCEDURE — 74176 CT ABD & PELVIS W/O CONTRAST: CPT

## 2024-10-28 PROCEDURE — 3008F BODY MASS INDEX DOCD: CPT | Performed by: INTERNAL MEDICINE

## 2024-10-28 PROCEDURE — 83036 HEMOGLOBIN GLYCOSYLATED A1C: CPT

## 2024-10-28 RX ORDER — LOSARTAN POTASSIUM 50 MG/1
50 TABLET ORAL DAILY
Qty: 90 TABLET | Refills: 3 | Status: SHIPPED | OUTPATIENT
Start: 2024-10-28

## 2024-10-28 RX ORDER — ISOSORBIDE MONONITRATE 30 MG/1
60 TABLET, EXTENDED RELEASE ORAL DAILY
Qty: 180 TABLET | Refills: 3 | Status: SHIPPED | OUTPATIENT
Start: 2024-10-28

## 2024-10-28 RX ORDER — PROMETHAZINE HYDROCHLORIDE 25 MG/1
25 TABLET ORAL EVERY 6 HOURS PRN
Qty: 90 TABLET | Refills: 1 | Status: SHIPPED | OUTPATIENT
Start: 2024-10-28

## 2024-10-28 RX ORDER — ATORVASTATIN CALCIUM 80 MG/1
80 TABLET, FILM COATED ORAL DAILY
Qty: 90 TABLET | Refills: 3 | Status: SHIPPED | OUTPATIENT
Start: 2024-10-28

## 2024-10-28 RX ORDER — OXYCODONE AND ACETAMINOPHEN 5; 325 MG/1; MG/1
1 TABLET ORAL EVERY 4 HOURS PRN
Qty: 12 TABLET | Refills: 0 | Status: SHIPPED | OUTPATIENT
Start: 2024-10-28

## 2024-10-28 ASSESSMENT — PATIENT HEALTH QUESTIONNAIRE - PHQ9
2. FEELING DOWN, DEPRESSED OR HOPELESS: NOT AT ALL
1. LITTLE INTEREST OR PLEASURE IN DOING THINGS: NOT AT ALL
SUM OF ALL RESPONSES TO PHQ9 QUESTIONS 1 AND 2: 0

## 2024-10-28 ASSESSMENT — PAIN SCALES - GENERAL: PAINLEVEL_OUTOF10: 0-NO PAIN

## 2024-10-29 ENCOUNTER — PHARMACY VISIT (OUTPATIENT)
Dept: PHARMACY | Facility: CLINIC | Age: 59
End: 2024-10-29
Payer: COMMERCIAL

## 2024-10-29 LAB
BASOPHILS # BLD AUTO: 0.05 X10*3/UL (ref 0–0.1)
BASOPHILS NFR BLD AUTO: 0.5 %
CREAT SERPL-MCNC: 1.29 MG/DL (ref 0.5–1.3)
EGFRCR SERPLBLD CKD-EPI 2021: 65 ML/MIN/1.73M*2
EOSINOPHIL # BLD AUTO: 0.17 X10*3/UL (ref 0–0.7)
EOSINOPHIL NFR BLD AUTO: 1.8 %
ERYTHROCYTE [DISTWIDTH] IN BLOOD BY AUTOMATED COUNT: 12.2 % (ref 11.5–14.5)
EST. AVERAGE GLUCOSE BLD GHB EST-MCNC: 105 MG/DL
HBA1C MFR BLD: 5.3 %
HCT VFR BLD AUTO: 42.9 % (ref 41–52)
HGB BLD-MCNC: 14.1 G/DL (ref 13.5–17.5)
IMM GRANULOCYTES # BLD AUTO: 0.02 X10*3/UL (ref 0–0.7)
IMM GRANULOCYTES NFR BLD AUTO: 0.2 % (ref 0–0.9)
LYMPHOCYTES # BLD AUTO: 2.16 X10*3/UL (ref 1.2–4.8)
LYMPHOCYTES NFR BLD AUTO: 22.9 %
MCH RBC QN AUTO: 30.5 PG (ref 26–34)
MCHC RBC AUTO-ENTMCNC: 32.9 G/DL (ref 32–36)
MCV RBC AUTO: 93 FL (ref 80–100)
MONOCYTES # BLD AUTO: 0.81 X10*3/UL (ref 0.1–1)
MONOCYTES NFR BLD AUTO: 8.6 %
NEUTROPHILS # BLD AUTO: 6.23 X10*3/UL (ref 1.2–7.7)
NEUTROPHILS NFR BLD AUTO: 66 %
NRBC BLD-RTO: 0 /100 WBCS (ref 0–0)
PLATELET # BLD AUTO: 225 X10*3/UL (ref 150–450)
RBC # BLD AUTO: 4.62 X10*6/UL (ref 4.5–5.9)
WBC # BLD AUTO: 9.4 X10*3/UL (ref 4.4–11.3)

## 2024-11-02 DIAGNOSIS — E11.65 TYPE 2 DIABETES MELLITUS WITH HYPERGLYCEMIA, WITHOUT LONG-TERM CURRENT USE OF INSULIN: ICD-10-CM

## 2024-11-04 RX ORDER — DAPAGLIFLOZIN AND METFORMIN HYDROCHLORIDE 5; 1000 MG/1; MG/1
1 TABLET, FILM COATED, EXTENDED RELEASE ORAL DAILY
Qty: 90 TABLET | Refills: 3 | Status: SHIPPED | OUTPATIENT
Start: 2024-11-04 | End: 2024-11-06 | Stop reason: SDUPTHER

## 2024-11-06 ENCOUNTER — APPOINTMENT (OUTPATIENT)
Dept: PHARMACY | Facility: HOSPITAL | Age: 59
End: 2024-11-06
Payer: COMMERCIAL

## 2024-11-06 DIAGNOSIS — E11.65 TYPE 2 DIABETES MELLITUS WITH HYPERGLYCEMIA, WITHOUT LONG-TERM CURRENT USE OF INSULIN: ICD-10-CM

## 2024-11-06 RX ORDER — TIRZEPATIDE 7.5 MG/.5ML
7.5 INJECTION, SOLUTION SUBCUTANEOUS
Qty: 6 ML | Refills: 4 | Status: SHIPPED | OUTPATIENT
Start: 2024-11-06

## 2024-11-06 RX ORDER — DAPAGLIFLOZIN AND METFORMIN HYDROCHLORIDE 5; 1000 MG/1; MG/1
1 TABLET, FILM COATED, EXTENDED RELEASE ORAL DAILY
Qty: 90 TABLET | Refills: 3 | Status: SHIPPED | OUTPATIENT
Start: 2024-11-06

## 2024-11-06 NOTE — PROGRESS NOTES
Patient is sent at the request of Magda Gilman DO for my opinion regarding Type 2 diabetes.  My final recommendations will be communicated back to the requesting provider by way of shared medical record.    Subjective     Diabetes  He presents for his follow-up diabetic visit. He has type 2 diabetes mellitus. Diabetic complications include peripheral neuropathy. Risk factors for coronary artery disease include diabetes mellitus, male sex, dyslipidemia and hypertension. An ACE inhibitor/angiotensin II receptor blocker is being taken.     Patient reports BG is stable. A few low BG symptoms. Continued walking and added kettle bell and dumbbell. Patient feels weight loss has slowed d/t building muscle.     Weight (goal <200 lbs)  246 lbs (initial)  239 lbs 2/28/2024  234 lbs 3/20/2024  229 lbs 4/17/2024  223 lbs 5/15/2024  209 lbs 7/15/2024  200-205 lbs 9/4/2024  197 lbs 11/6/2024    Past Medical History:  He has a past medical history of Anxiety, Arthritis, Coronary artery disease, Depression, Diabetes mellitus (Multi), Diverticulosis, Hiatal hernia, Hypertension, Lumbar disc disease, Myocardial infarction (Multi), Personal history of other diseases of the circulatory system, Personal history of other diseases of the digestive system, Personal history of other diseases of the nervous system and sense organs, Pure hypercholesterolemia, unspecified, and Vision loss.    Past Surgical History:  He has a past surgical history that includes Back surgery (12/19/2014); Hernia repair (12/19/2014); Other surgical history (12/19/2014); Tonsillectomy (12/19/2014); Vasectomy (12/19/2014); Cardiac catheterization; Coronary stent placement; Colonoscopy; and Meniscectomy.    Social History:  He reports that he has never smoked. He has never been exposed to tobacco smoke. He has never used smokeless tobacco. He reports that he does not currently use alcohol. He reports current drug use. Drug: Marijuana.    Family History:  Family  "History   Problem Relation Name Age of Onset    Stroke Father      Breast cancer Sister      Stomach cancer Maternal Grandmother         Allergies:  Cat dander, Onion, and Hydromorphone    Current diet: reducing sugar and carbohydrate intake, does not eat for 12 hours a day (after dinner till breakfast the next day)      Current exercise: swimming 3x/week, occasional weight lifting or yoga at home.  More recently, walking 6 miles per day with every other day body weight exercises.    The patient does not have a known family history of diabetes.    Patient is using: glucometer  The patient is currently checking the blood glucose 1-2 times per day.    Hypoglycemia frequency: infrequent  Hypoglycemia awareness: Yes     Objective     Last Recorded Vitals:  There were no vitals filed for this visit.    Diabetes Pharmacotherapy:  Xigduo XR 5-1000 mg 1 tablet daily  Mounjaro 7.5 mg once weekly    Primary/Secondary Prevention   - Statin? Yes  - ACE-I/ARB? Yes  - Aspirin? No    Pertinent PMH Review:  - PMH of Pancreatitis: No  - PMH of Retinopathy: No  - PMH of Urinary Tract Infections: No  - PMH of MTC: No    Lab Review  Lab Results   Component Value Date    BILITOT 0.7 05/14/2024    CALCIUM 9.8 05/14/2024    CO2 29 05/14/2024     05/14/2024    CREATININE 1.29 10/28/2024    GLUCOSE 105 (H) 05/14/2024    ALKPHOS 89 05/14/2024    K 4.4 05/14/2024    PROT 6.5 05/14/2024     05/14/2024    AST 19 05/14/2024    ALT 22 05/14/2024    BUN 13 05/14/2024    ANIONGAP 13 05/14/2024    MG 2.52 (H) 10/12/2023    PHOS 3.4 03/07/2023    ALBUMIN 4.3 05/14/2024    GFRMALE 75 03/07/2023     Lab Results   Component Value Date    TRIG 127 05/14/2024    CHOL 100 05/14/2024    LDLCALC 39 05/14/2024    HDL 35.9 05/14/2024     Lab Results   Component Value Date    HGBA1C 5.3 10/28/2024    HGBA1C 5.6 06/24/2024    HGBA1C 6.3 (H) 03/21/2024     No components found for: \"UACR\"  The ASCVD Risk score (Renetta HERRERA, et al., 2019) failed to " calculate for the following reasons:    Risk score cannot be calculated because patient has a medical history suggesting prior/existing ASCVD    Health Maintenance:   Foot Exam: checked by PCP at every visit  Eye Exam: Sept-Oct 2023  Urine Albumin: 5.4   Influenza Immunization: 9/12/2023  Pneumonia Immunization: 1/24/2017, 7/20/2022    Drug Interactions:  No significant drug interactions identified at this time      Employee Health Diabetes Program (VBID)  - Patient enrolled in  Employee diabetes program for $0 co-pays on diabetes medications/supplies. Enrollment should be active in 2-4 weeks  - Requested VBID enrollment date: 11/6/24   - PharmD Management Level: 4  -  Pharmacy Fill Location: [facility] for home delivery     Assessment/Plan   Problem List Items Addressed This Visit       Type 2 diabetes mellitus    Relevant Medications    tirzepatide (Mounjaro) 7.5 mg/0.5 mL pen injector    dapaglifloz propaned-metformin (Xigduo XR) 5-1,000 mg    Other Relevant Orders    Referral to Clinical Pharmacy   Patients diabetes well controlled with most recent A1c of 5.6% (Goal < 7%). Weight and BG remain stable, patient tolerating therapy. Continue current therapy.     Continue:   Mounjaro 7.5 mg once weekly  Xigduo XR 5-1000 mg 1 tablet daily  Compliance at present is estimated to be excellent  Patient enrolled in  Employee Health Diabetes Program (ID)  PA approved for Mounjaro through 12/30/2222   Follow-up: 11/19/2025 at 11:30 am via phone  PCP Follow-Up: 3/17/2025    Continue all meds under the continuation of care with the referring provider and clinical pharmacy team.

## 2024-11-07 DIAGNOSIS — N32.81 OAB (OVERACTIVE BLADDER): ICD-10-CM

## 2024-11-07 RX ORDER — TAMSULOSIN HYDROCHLORIDE 0.4 MG/1
0.4 CAPSULE ORAL DAILY
Qty: 90 CAPSULE | Refills: 3 | Status: SHIPPED | OUTPATIENT
Start: 2024-11-07

## 2024-11-07 RX ORDER — TAMSULOSIN HYDROCHLORIDE 0.4 MG/1
0.4 CAPSULE ORAL DAILY
Qty: 90 CAPSULE | Refills: 3 | OUTPATIENT
Start: 2024-11-07

## 2024-11-19 ENCOUNTER — APPOINTMENT (OUTPATIENT)
Dept: PHARMACY | Facility: HOSPITAL | Age: 59
End: 2024-11-19
Payer: COMMERCIAL

## 2024-11-20 ENCOUNTER — HOSPITAL ENCOUNTER (OUTPATIENT)
Dept: RADIOLOGY | Facility: CLINIC | Age: 59
Discharge: HOME | End: 2024-11-20
Payer: COMMERCIAL

## 2024-11-20 DIAGNOSIS — E11.65 TYPE 2 DIABETES MELLITUS WITH HYPERGLYCEMIA, WITHOUT LONG-TERM CURRENT USE OF INSULIN: ICD-10-CM

## 2024-11-20 DIAGNOSIS — N28.1 RENAL CYST: ICD-10-CM

## 2024-11-20 PROCEDURE — 76770 US EXAM ABDO BACK WALL COMP: CPT

## 2024-11-20 PROCEDURE — 76770 US EXAM ABDO BACK WALL COMP: CPT | Performed by: STUDENT IN AN ORGANIZED HEALTH CARE EDUCATION/TRAINING PROGRAM

## 2024-11-20 PROCEDURE — RXMED WILLOW AMBULATORY MEDICATION CHARGE

## 2024-11-20 NOTE — TELEPHONE ENCOUNTER
Please refill    dapaglifloz propaned-metformin (Xigduo XR) 5-1,000 mg   1 tablet, Daily   [facility] [place]

## 2024-11-21 ENCOUNTER — PHARMACY VISIT (OUTPATIENT)
Dept: PHARMACY | Facility: CLINIC | Age: 59
End: 2024-11-21
Payer: COMMERCIAL

## 2024-11-21 DIAGNOSIS — Z96.89 SPINAL CORD STIMULATOR STATUS: ICD-10-CM

## 2024-11-21 DIAGNOSIS — G89.0 CENTRAL PAIN SYNDROME: Primary | ICD-10-CM

## 2024-11-21 DIAGNOSIS — G89.29 CHRONIC BILATERAL LOW BACK PAIN WITH LEFT-SIDED SCIATICA: ICD-10-CM

## 2024-11-21 DIAGNOSIS — M54.42 CHRONIC BILATERAL LOW BACK PAIN WITH LEFT-SIDED SCIATICA: ICD-10-CM

## 2024-11-21 RX ORDER — DAPAGLIFLOZIN AND METFORMIN HYDROCHLORIDE 5; 1000 MG/1; MG/1
1 TABLET, FILM COATED, EXTENDED RELEASE ORAL DAILY
Qty: 90 TABLET | Refills: 3 | Status: SHIPPED | OUTPATIENT
Start: 2024-11-21

## 2024-11-21 RX ORDER — KETAMINE HCL IN NACL, ISO-OSM 100MG/10ML
SYRINGE (ML) INJECTION ONCE
OUTPATIENT
Start: 2024-11-26

## 2024-11-21 RX ORDER — KETOROLAC TROMETHAMINE 30 MG/ML
30 INJECTION, SOLUTION INTRAMUSCULAR; INTRAVENOUS ONCE
OUTPATIENT
Start: 2024-11-26 | End: 2024-11-26

## 2024-11-25 ENCOUNTER — APPOINTMENT (OUTPATIENT)
Dept: CARDIOLOGY | Facility: CLINIC | Age: 59
End: 2024-11-25
Payer: COMMERCIAL

## 2024-11-26 ENCOUNTER — APPOINTMENT (OUTPATIENT)
Dept: CARDIOLOGY | Facility: CLINIC | Age: 59
End: 2024-11-26
Payer: COMMERCIAL

## 2024-11-26 ENCOUNTER — INFUSION (OUTPATIENT)
Dept: INFUSION THERAPY | Facility: CLINIC | Age: 59
End: 2024-11-26
Payer: COMMERCIAL

## 2024-11-26 VITALS
SYSTOLIC BLOOD PRESSURE: 98 MMHG | TEMPERATURE: 97.2 F | RESPIRATION RATE: 14 BRPM | DIASTOLIC BLOOD PRESSURE: 60 MMHG | OXYGEN SATURATION: 96 % | HEART RATE: 79 BPM

## 2024-11-26 DIAGNOSIS — G89.29 CHRONIC BILATERAL LOW BACK PAIN WITH LEFT-SIDED SCIATICA: ICD-10-CM

## 2024-11-26 DIAGNOSIS — M54.42 CHRONIC BILATERAL LOW BACK PAIN WITH LEFT-SIDED SCIATICA: ICD-10-CM

## 2024-11-26 DIAGNOSIS — Z96.89 SPINAL CORD STIMULATOR STATUS: ICD-10-CM

## 2024-11-26 DIAGNOSIS — G89.0 CENTRAL PAIN SYNDROME: ICD-10-CM

## 2024-11-26 PROCEDURE — 96375 TX/PRO/DX INJ NEW DRUG ADDON: CPT | Mod: INF

## 2024-11-26 PROCEDURE — 2500000004 HC RX 250 GENERAL PHARMACY W/ HCPCS (ALT 636 FOR OP/ED): Performed by: NURSE PRACTITIONER

## 2024-11-26 PROCEDURE — 96365 THER/PROPH/DIAG IV INF INIT: CPT | Mod: INF

## 2024-11-26 PROCEDURE — 96368 THER/DIAG CONCURRENT INF: CPT | Mod: INF

## 2024-11-26 RX ORDER — METOPROLOL TARTRATE 25 MG/1
25 TABLET, FILM COATED ORAL ONCE
OUTPATIENT
Start: 2024-12-26 | End: 2024-12-26

## 2024-11-26 RX ORDER — FAMOTIDINE 10 MG/ML
20 INJECTION INTRAVENOUS ONCE AS NEEDED
OUTPATIENT
Start: 2024-12-26

## 2024-11-26 RX ORDER — METOCLOPRAMIDE HYDROCHLORIDE 5 MG/ML
10 INJECTION INTRAMUSCULAR; INTRAVENOUS ONCE
OUTPATIENT
Start: 2024-12-26 | End: 2024-12-26

## 2024-11-26 RX ORDER — DIPHENHYDRAMINE HYDROCHLORIDE 50 MG/ML
25 INJECTION INTRAMUSCULAR; INTRAVENOUS ONCE
OUTPATIENT
Start: 2024-12-26 | End: 2024-12-26

## 2024-11-26 RX ORDER — KETAMINE HCL IN NACL, ISO-OSM 100MG/10ML
SYRINGE (ML) INJECTION ONCE
OUTPATIENT
Start: 2024-12-26

## 2024-11-26 RX ORDER — KETOROLAC TROMETHAMINE 30 MG/ML
30 INJECTION, SOLUTION INTRAMUSCULAR; INTRAVENOUS ONCE
Status: COMPLETED | OUTPATIENT
Start: 2024-11-26 | End: 2024-11-26

## 2024-11-26 RX ORDER — ONDANSETRON HYDROCHLORIDE 2 MG/ML
4 INJECTION, SOLUTION INTRAVENOUS ONCE
OUTPATIENT
Start: 2024-12-26 | End: 2024-12-26

## 2024-11-26 RX ORDER — KETAMINE HCL IN NACL, ISO-OSM 100MG/10ML
SYRINGE (ML) INJECTION ONCE
Status: COMPLETED | OUTPATIENT
Start: 2024-11-26 | End: 2024-11-26

## 2024-11-26 RX ORDER — DIPHENHYDRAMINE HYDROCHLORIDE 50 MG/ML
50 INJECTION INTRAMUSCULAR; INTRAVENOUS AS NEEDED
OUTPATIENT
Start: 2024-12-26

## 2024-11-26 RX ORDER — NITROGLYCERIN 0.4 MG/1
0.4 TABLET SUBLINGUAL ONCE
OUTPATIENT
Start: 2024-12-26 | End: 2024-12-26

## 2024-11-26 RX ORDER — KETOROLAC TROMETHAMINE 30 MG/ML
30 INJECTION, SOLUTION INTRAMUSCULAR; INTRAVENOUS ONCE
OUTPATIENT
Start: 2024-12-26 | End: 2024-12-26

## 2024-11-26 RX ORDER — ALBUTEROL SULFATE 0.83 MG/ML
3 SOLUTION RESPIRATORY (INHALATION) AS NEEDED
OUTPATIENT
Start: 2024-12-26

## 2024-11-26 RX ORDER — EPINEPHRINE 0.3 MG/.3ML
0.3 INJECTION SUBCUTANEOUS EVERY 5 MIN PRN
OUTPATIENT
Start: 2024-12-26

## 2024-11-26 ASSESSMENT — PAIN SCALES - GENERAL
PAINLEVEL_OUTOF10: 7
PAINLEVEL_OUTOF10: 2
PAINLEVEL_OUTOF10: 7
PAINLEVEL_OUTOF10: 7

## 2024-11-26 ASSESSMENT — ENCOUNTER SYMPTOMS
LOSS OF SENSATION IN FEET: 1
DEPRESSION: 0
OCCASIONAL FEELINGS OF UNSTEADINESS: 0

## 2024-11-26 NOTE — PROGRESS NOTES
S: Patient here for 19th opioid sparing pain infusion. Patient reports 80% reduction in pain after last infusion that lasted 18 days.    Purpose of pain infusion meds explained along with potential side effects.  Patient verbalized understanding.    B: Pain Issues. Low back    Is Patient breast feeding: N/A    A: Patient currently has pain described on flow sheet documentation. Designated  is Paulo. Patient last ate solid food >12 hours ago, and had liquid 1 hours ago.    R: Plan; Obtain IV access, do patient risk assessment, and start opioid sparing infusion as ordered. Monitoring for S/S of adverse reactions.    Post infusion teaching provided. Patient verbalized understanding. VSS, Patient states pain is 2/10. Will assist patient to waiting car via wheelchair.

## 2024-11-26 NOTE — PATIENT INSTRUCTIONS
Today :We administered ketamine 30 mg-lidocaine 300 mg, propofol, and ketorolac.     For:   1. Central pain syndrome    2. Chronic bilateral low back pain with left-sided sciatica    3. Spinal cord stimulator status       (Tell all doctors including dentists that you are taking this medication)     Go to the emergency room or call 911 if:  -You have signs of allergic reaction:   -Rash, hives, itching.   -Swollen, blistered, peeling skin.   -Swelling of face, lips, mouth, tongue or throat.   -Tightness of chest, trouble breathing, swallowing or talking     Call your doctor:  - If IV / injection site gets red, warm, swollen, itchy or leaks fluid or pus.     (Leave dressing on your IV site for at least 2 hours and keep area clean and dry  - If you get sick or have symptoms of infection or are not feeling well for any reason.    (Wash your hands often, stay away from people who are sick)  - If you have side effects from your medication that do not go away or are bothersome.     (Refer to the teaching your nurse gave you for side effects to call your doctor about)    - Common side effects may include:  stuffy nose, headache, feeling tired, muscle aches, upset stomach  - Before receiving any vaccines     - Call [facility] at   If:  - You get sick, are on antibiotics, have had a recent vaccine, have surgery or dental work and your doctor wants your visit rescheduled.  - You need to cancel and reschedule your visit for any reason. Call at least 2 days before your visit if you need to cancel.   - Your insurance changes before your next visit.    (We will need to get approval from your new insurance. This can take up to two weeks.)     The [facility] is opened Monday thru Friday. We are closed on weekends and holidays.   Voice mail will take your call if the center is closed. If you leave a message please allow 24 hours for a call back during weekdays. If you leave a message on a weekend/holiday, we  will call you back the next business day.    A pharmacist is available Monday - Friday from 8:30AM to 3:30PM to help answer any questions you may have about your prescriptions(s). Please call pharmacy at:    [facility]: [phone]  [facility]: [phone]  [facility]: [phone]              [facility]      Pain Infusion Aftercare Instructions      1. It is normal to feel sedated, tired and low in energy after a pain infusion. DO NOT DRIVE, OPERATE ANY MACHINERY, OR MAKE ANY IMPORTANT DECISIONS FOR AT LEAST 24 HOURS AFTER THE INFUSION.     2. Call the pain center at [phone] with any problems, questions, or concerns.     3. Eat light after the infusion. If you feel queasy or sick to your stomach, laying down with your eyes closed may help. When you resume eating start with something mild like clear liquids, yogurt, applesauce, crackers, etc… Gradually advance to a regular diet.     4. Do not leave your house alone the evening of your pain infusion.     5. No alcohol or sedative medications, such as sleeping pills, for 24 hours after your pain infusion.     6. Resume all other prescribed medications unless directed otherwise by you physician.     7. If you have any medical emergencies, call 911 or go directly to the closest emergency room.

## 2024-11-27 ENCOUNTER — APPOINTMENT (OUTPATIENT)
Dept: CARDIOLOGY | Facility: CLINIC | Age: 59
End: 2024-11-27
Payer: COMMERCIAL

## 2024-11-27 DIAGNOSIS — I25.118 CORONARY ARTERY DISEASE OF NATIVE ARTERY OF NATIVE HEART WITH STABLE ANGINA PECTORIS: Primary | ICD-10-CM

## 2024-11-27 DIAGNOSIS — I10 ESSENTIAL HYPERTENSION: ICD-10-CM

## 2024-11-27 DIAGNOSIS — I25.2 H/O ACUTE MYOCARDIAL INFARCTION: ICD-10-CM

## 2024-11-27 DIAGNOSIS — E78.2 MIXED HYPERLIPIDEMIA: ICD-10-CM

## 2024-11-27 DIAGNOSIS — Z95.5 CORONARY STENT PATENT: ICD-10-CM

## 2024-11-27 NOTE — PROGRESS NOTES
Chief Complaint:   No chief complaint on file.     History Of Present Illness:    Logan Campos is a 58 y.o. male with PMHx of MI x2, CAD s/p stents x8, DM2, COPD, and chronic back pain presenting for follow up.    He previously underwent a left heart catheterization which revealed normal coronaries and patent stents. He has recently lost 25 lbs and is now exercising (walking and swimming) with no complaints.  He does occasionally have episodes of angina, but admits this is not as frequent as 6 months ago.  He denies any complaints of shortness of breath.  He also notes marked improvement in his back pain since he lost weight.      Last Recorded Vitals:  There were no vitals filed for this visit.    Past Medical History:  He has a past medical history of Anxiety, Arthritis, Coronary artery disease, Depression, Diabetes mellitus (Multi), Diverticulosis, Hiatal hernia, Hypertension, Lumbar disc disease, Myocardial infarction (Multi), Personal history of other diseases of the circulatory system, Personal history of other diseases of the digestive system, Personal history of other diseases of the nervous system and sense organs, Pure hypercholesterolemia, unspecified, and Vision loss.    Past Surgical History:  He has a past surgical history that includes Back surgery (12/19/2014); Hernia repair (12/19/2014); Other surgical history (12/19/2014); Tonsillectomy (12/19/2014); Vasectomy (12/19/2014); Cardiac catheterization; Coronary stent placement; Colonoscopy; and Meniscectomy.      Social History:  He reports that he has never smoked. He has never been exposed to tobacco smoke. He has never used smokeless tobacco. He reports that he does not currently use alcohol. He reports current drug use. Drug: Marijuana.    Family History:  Family History   Problem Relation Name Age of Onset    Stroke Father      Breast cancer Sister      Stomach cancer Maternal Grandmother          Allergies:  Cat dander, Onion, and  Hydromorphone    Outpatient Medications:  Current Outpatient Medications   Medication Instructions    aspirin 325 mg tablet 1 tablet, Daily    atorvastatin (LIPITOR) 80 mg, oral, Daily    cholecalciferol (Vitamin D-3) 5,000 Units tablet 1 tablet, Daily    chrm/vineg/bit-orang peel/gr t (APPLE CIDER VINEGAR PLUS ORAL) Daily    clopidogrel (PLAVIX) 75 mg, oral, Daily    dapaglifloz propaned-metformin (Xigduo XR) 5-1,000 mg 1 tablet, oral, Daily, Take with food.    DULoxetine (CYMBALTA) 60 mg, oral, Daily    EPINEPHrine (EpiPen) 0.3 mg/0.3 mL injection syringe 0.3 mL    fish oil concentrate (Omega-3) 120-180 mg capsule 6,000 mg, Daily    hydrOXYzine pamoate (VISTARIL) 50 mg, Once    isosorbide mononitrate ER (IMDUR) 60 mg, oral, Daily    ketamine HCl (ketamine, bulk,) 100 % powder Ketamine 100 mg/mL + lidocaine 40 mg/mL 1 puff 4 times daily as needed, DSP#20 mL x 5 refills    lidocaine HCl, bulk, 100 % powder powder In ketamine nasal spray    losartan (COZAAR) 50 mg, oral, Daily    Mounjaro 7.5 mg, subcutaneous, Every 7 days    Myrbetriq 50 mg, oral, Daily    nitroglycerin (Nitrostat) 0.4 mg SL tablet Place under the tongue.    NON FORMULARY Alive veggie based vitamins    NON FORMULARY Medical Marijuana    oxyCODONE-acetaminophen (Percocet) 5-325 mg tablet 1 tablet, oral, Every 4 hours PRN    pantoprazole (PROTONIX) 40 mg, oral, Daily, Do not crush, chew, or split.    pregabalin (LYRICA) 50 mg, oral, 3 times daily    promethazine (PHENERGAN) 25 mg, oral, Every 6 hours PRN    rOPINIRole (REQUIP) 1 mg, oral, Nightly    sertraline (ZOLOFT) 150 mg, Daily    tamsulosin (FLOMAX) 0.4 mg, oral, Daily    Tiadylt  mg, oral, Daily    tiZANidine (ZANAFLEX) 4 mg, oral, 3 times daily PRN    traZODone (DESYREL) 100 mg, oral, Nightly, Take one half or one whole tablet about 20 min before bedtime    VITAMIN B COMPLEX ORAL 1 tablet, Daily       Physical Exam:  ***     Last Labs:  CBC -  Lab Results   Component Value Date    WBC  9.4 10/28/2024    HGB 14.1 10/28/2024    HCT 42.9 10/28/2024    MCV 93 10/28/2024     10/28/2024       CMP -  Lab Results   Component Value Date    CALCIUM 9.8 05/14/2024    PHOS 3.4 03/07/2023    PROT 6.5 05/14/2024    ALBUMIN 4.3 05/14/2024    AST 19 05/14/2024    ALT 22 05/14/2024    ALKPHOS 89 05/14/2024    BILITOT 0.7 05/14/2024       LIPID PANEL -   Lab Results   Component Value Date    CHOL 100 05/14/2024    TRIG 127 05/14/2024    HDL 35.9 05/14/2024    CHHDL 2.8 05/14/2024    LDLF 57 01/10/2023    VLDL 25 05/14/2024    NHDL 64 05/14/2024       RENAL FUNCTION PANEL -   Lab Results   Component Value Date    GLUCOSE 105 (H) 05/14/2024     05/14/2024    K 4.4 05/14/2024     05/14/2024    CO2 29 05/14/2024    ANIONGAP 13 05/14/2024    BUN 13 05/14/2024    CREATININE 1.29 10/28/2024    GFRMALE 75 03/07/2023    CALCIUM 9.8 05/14/2024    PHOS 3.4 03/07/2023    ALBUMIN 4.3 05/14/2024        Lab Results   Component Value Date    HGBA1C 5.3 10/28/2024    HGBA1C 5.6 06/24/2024       Last Cardiology Tests:  ECG:  ECG 12 lead 01/09/2024  NSR    Echo:  Transthoracic Echo (TTE) Complete 10/31/2023   1. Left ventricular systolic function is normal with a 60% estimated ejection fraction.   2. Spectral Doppler shows an impaired relaxation pattern of left ventricular diastolic filling.    Cath:  [facility] 3/15/23  1. Non-obstructive coronary artery disease.   2. Patent stents.      Stress Test:  Nuclear Stress Test 01/18/2023  Abnormal Lexiscan Myoview cardiac perfusion stress test.  Mild small anteroapical myocardial ischemia by perfusion imaging.  No myocardial infarction by perfusion imaging.  Abnormal left ventricular systolic function.  Left ventricular ejection fraction 49 %.  Compared prior study of April 13, 2015 small anteroapical defect previously described as fixed is now appear reversible which may suggest artifact, LVEF was previously reported at 63%.    Cardiac Imaging:  Carotid Duplex  12/21/2022  Right Carotid: < 50% stenosis of the right proximal ICA.   Left Carotid: < 50% stenosis of the left proximal ICA.       I have personally reviewed most recent PCP, cardiology, vascular, studies and/or documentation.      Assessment/Plan   CAD, s/p PCI with x8 stents. Denies any chest pain today. He is taking Imdur 30mg daily. Continue high intensity statin, aspirin, and plavix.     HLD, 5/14/24 LDL 39, HDL 36, tirg 127, on atorvastatin 80mg daily. Goal LDL <70. Labs monitored by PCP.    HTN, well controlled. Continue current GDMT.     DM2, well controlled with most recent A1c of 5.6%. He is on Maunjaor, and has lost some weight. This is followed by his PCP and he is enrolled in  employee health diabetes program.     Follow up with me in 6 months.     Coreen Byers, APRN-CNP

## 2024-12-04 ENCOUNTER — APPOINTMENT (OUTPATIENT)
Dept: UROLOGY | Facility: CLINIC | Age: 59
End: 2024-12-04
Payer: COMMERCIAL

## 2024-12-04 VITALS
DIASTOLIC BLOOD PRESSURE: 76 MMHG | HEIGHT: 68 IN | WEIGHT: 205.25 LBS | HEART RATE: 88 BPM | SYSTOLIC BLOOD PRESSURE: 109 MMHG | RESPIRATION RATE: 16 BRPM | BODY MASS INDEX: 31.11 KG/M2

## 2024-12-04 DIAGNOSIS — N28.1 RENAL CYST: ICD-10-CM

## 2024-12-04 DIAGNOSIS — N32.81 OAB (OVERACTIVE BLADDER): ICD-10-CM

## 2024-12-04 DIAGNOSIS — R35.0 FREQUENCY OF URINATION: ICD-10-CM

## 2024-12-04 DIAGNOSIS — R35.0 FREQUENCY OF URINATION: Primary | ICD-10-CM

## 2024-12-04 PROCEDURE — 3008F BODY MASS INDEX DOCD: CPT | Performed by: UROLOGY

## 2024-12-04 PROCEDURE — 3044F HG A1C LEVEL LT 7.0%: CPT | Performed by: UROLOGY

## 2024-12-04 PROCEDURE — 3061F NEG MICROALBUMINURIA REV: CPT | Performed by: UROLOGY

## 2024-12-04 PROCEDURE — 3074F SYST BP LT 130 MM HG: CPT | Performed by: UROLOGY

## 2024-12-04 PROCEDURE — G2211 COMPLEX E/M VISIT ADD ON: HCPCS | Performed by: UROLOGY

## 2024-12-04 PROCEDURE — 3078F DIAST BP <80 MM HG: CPT | Performed by: UROLOGY

## 2024-12-04 PROCEDURE — 1036F TOBACCO NON-USER: CPT | Performed by: UROLOGY

## 2024-12-04 PROCEDURE — 3048F LDL-C <100 MG/DL: CPT | Performed by: UROLOGY

## 2024-12-04 PROCEDURE — 99214 OFFICE O/P EST MOD 30 MIN: CPT | Performed by: UROLOGY

## 2024-12-04 PROCEDURE — 4010F ACE/ARB THERAPY RXD/TAKEN: CPT | Performed by: UROLOGY

## 2024-12-04 RX ORDER — TROSPIUM CHLORIDE ER 60 MG/1
60 CAPSULE ORAL DAILY
Qty: 90 CAPSULE | Refills: 3 | Status: SHIPPED | OUTPATIENT
Start: 2024-12-04 | End: 2025-11-29

## 2024-12-04 RX ORDER — TAMSULOSIN HYDROCHLORIDE 0.4 MG/1
0.4 CAPSULE ORAL DAILY
Qty: 90 CAPSULE | Refills: 3 | Status: SHIPPED | OUTPATIENT
Start: 2024-12-04

## 2024-12-04 ASSESSMENT — PAIN SCALES - GENERAL: PAINLEVEL_OUTOF10: 0-NO PAIN

## 2024-12-04 ASSESSMENT — ENCOUNTER SYMPTOMS
FREQUENCY: 1
DIFFICULTY URINATING: 0
HEMATURIA: 0
DYSURIA: 0

## 2024-12-04 NOTE — PROGRESS NOTES
Provider Impressions     59 year-old white male, his wife Paulo, is a registered nurse at [employer]. 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 He is presently on PLAVIX. He is disabled due to his heart disease. He also takes Cymbalta. Patient has a positive family history for prostate cancer. He is never smoked cigarettes.      01/17/14 CYSTOSCOPY WITH URODYNAMICS. Myrbetriq 25 mg was prescribed.     04/11/14, nocturia x6 has decreased to x2. His urgency also decreased.     12/19/14 renal ultrasound negative, PSA 0.8. Flow rate is low but there was no volume. Postvoid residual was small. Patient states that his nocturia and frequency are down and he is starting to experience some new urgency. He does not wish to pursue that at this time.     12/21/2015â€“established patient here today for review of testing. Pt states that insurance would not cover his Myrbetriq so he has not taken since June of 2015. States is continuing to have frequency, urgency, urine stream starts and stops, he also is having dribbling at any time, nocturia 2-3 times nightly. PSA 0.5, flow rate 18.6/PVR 28 mL, renal ultrasound shows simple right renal cyst slightly increased in size and left cystic lesion 2 small to characterize.     12/29/2016- spoke with Mr. Campos via telephone. Apparently [employer] requires a prior authorization. We have decided to start with a step process instructed to start oxybutynin 5 mg every 12 hours by mouth. Patient verbalizes an understanding. Patient will reschedule follow-up for 4 weeks. If this does not prove beneficial we may increase oxybutynin to 3 times daily.     1/27/2016â€“established patient here today for follow-up. Flow rate 24.9, PVR 60 mL Pt indicates frequency much better, urgency better but still there. Starting and stopping of urine stream continues. Nocturia 2-3 times nightly. No other concerns     2/24/2016- Established pt here today for f/u on meds, flow rate, and PVR. Pt indicates since starting the TID  "regimen of Oxybutynin he is only waking at night 1x. States urgency is better and he is able to make it to the bathroom. States the starting and stopping of urine stream is better.. States the dribbling is better also. States life is better \"I can sit through a Movie, which was impossible before\". No adverse effects. FR 2.3 PVR 0ml, voided urine was 23.7 ml.     2/14/2017â€“Established pt here today for annual follow up and review of testing.Including renal, ultrasound, and PSA. Also flow rate and PVR. Patient voices no concerns. Reports nocturia Ã--1. Also indicates urgency is better. He feels that he empties completely most of the time. PSA 1.2. Renal ultrasoundâ€“1.6 cm anechoic lesion, compatible with cyst on right kidney. Subcentimeter hypoechoic lesion likely represent she represents a cyst on left kidney both lesions remain unchanged from prior exam. No hydronephrosis. Flow rate 32.8, PVR 52 mL.     04/17/18, patient states his frequency has increased slightly and he would like to increase his oxybutynin once again to 5 mg every 8 hours. PVR only 18 now. Nocturia Ã--1. He continues on daily Flomax. Ultrasound indicates stable 1.7 cm cyst in the right upper pole and a 0.9 cm left upper pole cyst. PSA is normal at 1.6. He will return in December.     PATIENT DISAPPEARED     Patient under the care of Dr. Simmons at [facility]     November 18, 2022, patient calls to reestablish care.     February 9, 2023, patient calls to obtain prescription for Flomax prior to follow-up visit.     March 1, 2023, patient arrives alone. He is providing care for his 13-year-old daughter Beata. He states that he has a good flow of stream however does have occasional urgency and frequency. We will continue his daily Flomax and increase his Myrbetriq to 50 mg. Today's flow rate 21 cc/s, total volume 202 cc, PVR 40 cc. Urinalysis is negative for blood. Urine culture no growth. Urine cytology is negative for malignant cells. PSA is " stable at 1.95. Renal ultrasound identifies bilateral simple cysts unchanged. He will return in December.     January 10, 2024, Dr. Rivas, neurostimulator sensor replaced     February 6, 2024, patient arrives alone.  He has no new urologic complaints.  He continues on daily Flomax and Myrbetriq 50 mg.  Flow rate today is 31 cc/s, total volume 591 cc, PVR 93 cc.  PSA was not obtained.  Renal ultrasound is unremarkable.  Stable bilateral simple cysts.  He will return in December.    April 2024, neurostimulator replaced.    December 4, 2024, patient arrives alone.  He has no new urologic complaints however, he has discontinued Myrbetriq as his insurance has changed and it is too expensive.  We will now substitute Sanctura XR 60 mg daily.  Today's PVR is 153 cc.  He continues on daily Flomax.  PSA again was not obtained.  Renal ultrasound shows stable bilateral simple renal cysts.  No evidence of hydronephrosis or stones.  He will return in December.     PLAN:     #1 Flomax 0.4 mg and Sanctura XR 60 mg both p.o. daily     #2 patient will return in December of 2025. with a PSA, post void residual, and renal ultrasound.     Physical Exam  Vitals and nursing note reviewed.   Constitutional:       Appearance: Normal appearance.   HENT:      Head: Normocephalic and atraumatic.   Pulmonary:      Effort: Pulmonary effort is normal.   Abdominal:      Palpations: Abdomen is soft.      Tenderness: There is no abdominal tenderness.   Musculoskeletal:         General: Normal range of motion.      Cervical back: Normal range of motion and neck supple.   Neurological:      General: No focal deficit present.      Mental Status: He is alert and oriented to person, place, and time.   Psychiatric:         Mood and Affect: Mood normal.         Behavior: Behavior normal.        This note was created with voice-recognition software and was not corrected for typographical or grammatical errors.

## 2024-12-04 NOTE — PROGRESS NOTES
Patient denies any pain today. Patient states he had nuerostim replaced in back April 2024. Patient denies any concerns about falling or safety. Patient has occasional frequency, urgency, double voiding. Patient taking Flomax as directed, myrbetriq was too expensive and they stopped the discount program. CV    Review of Systems   Genitourinary:  Positive for frequency and urgency. Negative for difficulty urinating, dysuria and hematuria.   All other systems reviewed and are negative.

## 2024-12-04 NOTE — PATIENT INSTRUCTIONS
Patient Discussion/Summary     It was very nice to see you today. The ultrasound of your kidneys shows that you still have simple cysts in each kidney that are unchanged. We will continue to follow. We will continue your daily Flomax.  Evidently, the Myrbetriq is no longer covered by your insurance.  Therefore we will substitute Sanctura XR.  We will have you return in December of 2025 with the same testing including a renal ultrasound, PSA, and Post void residual. Please come to the office with a full bladder. If you have any further questions or concerns please do not hesitate to call the office.      This note was created with voice-recognition software and was not corrected for typographical or grammatical errors.

## 2024-12-04 NOTE — LETTER
December 4, 2024     Magda Gilman DO  [address]  [facility], [address]  [facility] [id number]    Patient: Logan Campos   YOB: 1965   Date of Visit: 12/4/2024       Dear Dr. Magda Gilman DO:    Thank you for referring Logan Campos to me for evaluation. Below are my notes for this consultation.  If you have questions, please do not hesitate to call me. I look forward to following your patient along with you.       Sincerely,     Eric Preston MD      CC: No Recipients  ______________________________________________________________________________________      Provider Impressions     59 year-old white male, his wife Paulo, is a registered nurse at [employer]. 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 He is presently on PLAVIX. He is disabled due to his heart disease. He also takes Cymbalta. Patient has a positive family history for prostate cancer. He is never smoked cigarettes.      01/17/14 CYSTOSCOPY WITH URODYNAMICS. Myrbetriq 25 mg was prescribed.     04/11/14, nocturia x6 has decreased to x2. His urgency also decreased.     12/19/14 renal ultrasound negative, PSA 0.8. Flow rate is low but there was no volume. Postvoid residual was small. Patient states that his nocturia and frequency are down and he is starting to experience some new urgency. He does not wish to pursue that at this time.     12/21/2015â€“established patient here today for review of testing. Pt states that insurance would not cover his Myrbetriq so he has not taken since June of 2015. States is continuing to have frequency, urgency, urine stream starts and stops, he also is having dribbling at any time, nocturia 2-3 times nightly. PSA 0.5, flow rate 18.6/PVR 28 mL, renal ultrasound shows simple right renal cyst slightly increased in size and left cystic lesion 2 small to characterize.     12/29/2016- spoke with Mr. Campos via telephone. Apparently [employer] requires a prior authorization. We have decided to start with a step  "process instructed to start oxybutynin 5 mg every 12 hours by mouth. Patient verbalizes an understanding. Patient will reschedule follow-up for 4 weeks. If this does not prove beneficial we may increase oxybutynin to 3 times daily.     1/27/2016â€“established patient here today for follow-up. Flow rate 24.9, PVR 60 mL Pt indicates frequency much better, urgency better but still there. Starting and stopping of urine stream continues. Nocturia 2-3 times nightly. No other concerns     2/24/2016- Established pt here today for f/u on meds, flow rate, and PVR. Pt indicates since starting the TID regimen of Oxybutynin he is only waking at night 1x. States urgency is better and he is able to make it to the bathroom. States the starting and stopping of urine stream is better.. States the dribbling is better also. States life is better \"I can sit through a Movie, which was impossible before\". No adverse effects. FR 2.3 PVR 0ml, voided urine was 23.7 ml.     2/14/2017â€“Established pt here today for annual follow up and review of testing.Including renal, ultrasound, and PSA. Also flow rate and PVR. Patient voices no concerns. Reports nocturia Ã--1. Also indicates urgency is better. He feels that he empties completely most of the time. PSA 1.2. Renal ultrasoundâ€“1.6 cm anechoic lesion, compatible with cyst on right kidney. Subcentimeter hypoechoic lesion likely represent she represents a cyst on left kidney both lesions remain unchanged from prior exam. No hydronephrosis. Flow rate 32.8, PVR 52 mL.     04/17/18, patient states his frequency has increased slightly and he would like to increase his oxybutynin once again to 5 mg every 8 hours. PVR only 18 now. Nocturia Ã--1. He continues on daily Flomax. Ultrasound indicates stable 1.7 cm cyst in the right upper pole and a 0.9 cm left upper pole cyst. PSA is normal at 1.6. He will return in December.     PATIENT DISAPPEARED     Patient under the care of Dr. Simmons at [facility] " General     November 18, 2022, patient calls to reestablish care.     February 9, 2023, patient calls to obtain prescription for Flomax prior to follow-up visit.     March 1, 2023, patient arrives alone. He is providing care for his 13-year-old daughter Beata. He states that he has a good flow of stream however does have occasional urgency and frequency. We will continue his daily Flomax and increase his Myrbetriq to 50 mg. Today's flow rate 21 cc/s, total volume 202 cc, PVR 40 cc. Urinalysis is negative for blood. Urine culture no growth. Urine cytology is negative for malignant cells. PSA is stable at 1.95. Renal ultrasound identifies bilateral simple cysts unchanged. He will return in December.     January 10, 2024, Dr. Rivas, neurostimulator sensor replaced     February 6, 2024, patient arrives alone.  He has no new urologic complaints.  He continues on daily Flomax and Myrbetriq 50 mg.  Flow rate today is 31 cc/s, total volume 591 cc, PVR 93 cc.  PSA was not obtained.  Renal ultrasound is unremarkable.  Stable bilateral simple cysts.  He will return in December.    April 2024, neurostimulator replaced.    December 4, 2024, patient arrives alone.  He has no new urologic complaints however, he has discontinued Myrbetriq as his insurance has changed and it is too expensive.  We will now substitute Sanctura XR 60 mg daily.  Today's PVR is 153 cc.  He continues on daily Flomax.  PSA again was not obtained.  Renal ultrasound shows stable bilateral simple renal cysts.  No evidence of hydronephrosis or stones.  He will return in December.     PLAN:     #1 Flomax 0.4 mg and Sanctura XR 60 mg both p.o. daily     #2 patient will return in December of 2025. with a PSA, post void residual, and renal ultrasound.     Physical Exam  Vitals and nursing note reviewed.   Constitutional:       Appearance: Normal appearance.   HENT:      Head: Normocephalic and atraumatic.   Pulmonary:      Effort: Pulmonary effort is normal.    Abdominal:      Palpations: Abdomen is soft.      Tenderness: There is no abdominal tenderness.   Musculoskeletal:         General: Normal range of motion.      Cervical back: Normal range of motion and neck supple.   Neurological:      General: No focal deficit present.      Mental Status: He is alert and oriented to person, place, and time.   Psychiatric:         Mood and Affect: Mood normal.         Behavior: Behavior normal.        This note was created with voice-recognition software and was not corrected for typographical or grammatical errors.

## 2024-12-18 ENCOUNTER — OFFICE VISIT (OUTPATIENT)
Dept: PAIN MEDICINE | Facility: CLINIC | Age: 59
End: 2024-12-18
Payer: COMMERCIAL

## 2024-12-18 ENCOUNTER — OFFICE VISIT (OUTPATIENT)
Dept: CARDIOLOGY | Facility: CLINIC | Age: 59
End: 2024-12-18
Payer: COMMERCIAL

## 2024-12-18 VITALS
BODY MASS INDEX: 29.7 KG/M2 | TEMPERATURE: 97.3 F | WEIGHT: 196 LBS | HEART RATE: 94 BPM | DIASTOLIC BLOOD PRESSURE: 55 MMHG | OXYGEN SATURATION: 96 % | HEIGHT: 68 IN | SYSTOLIC BLOOD PRESSURE: 91 MMHG

## 2024-12-18 VITALS
WEIGHT: 198 LBS | HEART RATE: 80 BPM | OXYGEN SATURATION: 98 % | BODY MASS INDEX: 30.01 KG/M2 | HEIGHT: 68 IN | DIASTOLIC BLOOD PRESSURE: 80 MMHG | SYSTOLIC BLOOD PRESSURE: 115 MMHG

## 2024-12-18 DIAGNOSIS — E78.2 MIXED HYPERLIPIDEMIA: ICD-10-CM

## 2024-12-18 DIAGNOSIS — M47.817 LUMBOSACRAL SPONDYLOSIS WITHOUT MYELOPATHY: ICD-10-CM

## 2024-12-18 DIAGNOSIS — I10 ESSENTIAL HYPERTENSION: ICD-10-CM

## 2024-12-18 DIAGNOSIS — I20.1 PRINZMETAL VARIANT ANGINA (CMS-HCC): ICD-10-CM

## 2024-12-18 DIAGNOSIS — G61.9 INFLAMMATORY NEUROPATHY (MULTI): ICD-10-CM

## 2024-12-18 DIAGNOSIS — Z96.89 SPINAL CORD STIMULATOR STATUS: ICD-10-CM

## 2024-12-18 DIAGNOSIS — G89.4 CHRONIC PAIN SYNDROME: ICD-10-CM

## 2024-12-18 DIAGNOSIS — I25.10 CORONARY ARTERY DISEASE, UNSPECIFIED VESSEL OR LESION TYPE, UNSPECIFIED WHETHER ANGINA PRESENT, UNSPECIFIED WHETHER NATIVE OR TRANSPLANTED HEART: Primary | ICD-10-CM

## 2024-12-18 DIAGNOSIS — Z95.5 CORONARY STENT PATENT: ICD-10-CM

## 2024-12-18 DIAGNOSIS — I65.29 CAROTID ATHEROSCLEROSIS, UNSPECIFIED LATERALITY: ICD-10-CM

## 2024-12-18 DIAGNOSIS — I25.2 H/O ACUTE MYOCARDIAL INFARCTION: ICD-10-CM

## 2024-12-18 DIAGNOSIS — M54.17 LUMBOSACRAL RADICULOPATHY: Primary | ICD-10-CM

## 2024-12-18 PROCEDURE — 99212 OFFICE O/P EST SF 10 MIN: CPT | Performed by: NURSE PRACTITIONER

## 2024-12-18 PROCEDURE — 3061F NEG MICROALBUMINURIA REV: CPT | Performed by: NURSE PRACTITIONER

## 2024-12-18 PROCEDURE — 3044F HG A1C LEVEL LT 7.0%: CPT | Performed by: NURSE PRACTITIONER

## 2024-12-18 PROCEDURE — 4010F ACE/ARB THERAPY RXD/TAKEN: CPT | Performed by: NURSE PRACTITIONER

## 2024-12-18 PROCEDURE — 99214 OFFICE O/P EST MOD 30 MIN: CPT

## 2024-12-18 PROCEDURE — 1036F TOBACCO NON-USER: CPT | Performed by: NURSE PRACTITIONER

## 2024-12-18 PROCEDURE — 3044F HG A1C LEVEL LT 7.0%: CPT

## 2024-12-18 PROCEDURE — 3078F DIAST BP <80 MM HG: CPT | Performed by: NURSE PRACTITIONER

## 2024-12-18 PROCEDURE — 3008F BODY MASS INDEX DOCD: CPT | Performed by: NURSE PRACTITIONER

## 2024-12-18 PROCEDURE — 4010F ACE/ARB THERAPY RXD/TAKEN: CPT

## 2024-12-18 PROCEDURE — 3074F SYST BP LT 130 MM HG: CPT

## 2024-12-18 PROCEDURE — 3048F LDL-C <100 MG/DL: CPT | Performed by: NURSE PRACTITIONER

## 2024-12-18 PROCEDURE — 3079F DIAST BP 80-89 MM HG: CPT

## 2024-12-18 PROCEDURE — 3048F LDL-C <100 MG/DL: CPT

## 2024-12-18 PROCEDURE — 3008F BODY MASS INDEX DOCD: CPT

## 2024-12-18 PROCEDURE — 3074F SYST BP LT 130 MM HG: CPT | Performed by: NURSE PRACTITIONER

## 2024-12-18 PROCEDURE — 3061F NEG MICROALBUMINURIA REV: CPT

## 2024-12-18 RX ORDER — KETAMINE HCL 100 %
POWDER (GRAM) MISCELLANEOUS
Qty: 20 G | Refills: 5 | Status: SHIPPED | OUTPATIENT
Start: 2024-12-18

## 2024-12-18 ASSESSMENT — ENCOUNTER SYMPTOMS
MYALGIAS: 1
DIFFICULTY URINATING: 0
HEADACHES: 0
AGITATION: 0
FATIGUE: 0
CONSTIPATION: 0
DIZZINESS: 0
FREQUENCY: 0
CONFUSION: 0
OCCASIONAL FEELINGS OF UNSTEADINESS: 0
NUMBNESS: 1
DEPRESSION: 0
PALPITATIONS: 0
DIARRHEA: 0
LOSS OF SENSATION IN FEET: 0
CHILLS: 0
SHORTNESS OF BREATH: 0
CHEST TIGHTNESS: 1
NAUSEA: 0
WHEEZING: 0
BACK PAIN: 1

## 2024-12-18 ASSESSMENT — PAIN DESCRIPTION - DESCRIPTORS: DESCRIPTORS: ACHING;BURNING;STABBING;SPASM

## 2024-12-18 ASSESSMENT — LIFESTYLE VARIABLES: TOTAL SCORE: 5

## 2024-12-18 ASSESSMENT — PAIN - FUNCTIONAL ASSESSMENT: PAIN_FUNCTIONAL_ASSESSMENT: 0-10

## 2024-12-18 ASSESSMENT — PAIN SCALES - GENERAL
PAINLEVEL_OUTOF10: 4
PAINLEVEL_OUTOF10: 4

## 2024-12-18 NOTE — PROGRESS NOTES
Chief Complain  Follow-up for widespread muscle and joint pain with lower back pain radiating to bilateral lower extremities.  And chronic pain medication management.      History Of Present Illness  Logan Campos is a 59 y.o. male here for lower back pain radiating to bilateral lower extremities. The patient rates the pain at 4 on a scale from 0-10.  The patient describes pain as aching, burning stabbing with muscle spasms.  The pain is worsened by bending forward, standing, prolonged sitting, walking, lifting, twisting, and squatting and is alleviated by ice, medications nonsteroidal anti-inflammatory drugs and prescribed pain medications, position change, sitting, lying down, and IV infusion therapy .  Since the last visit the pain has stayed the same.    The patient denies any fever, chills, weight loss, weakness, bladder/ bowel incontinence, history of cancer, history of IV drug abuse, recent trauma.     Prior office visit:  10/9/2024  Logan Campos is a 58 y.o. male recall past medical history of obesity BMI 32, Prinzmetal syndrome, MI x 2, coronary artery stents x 8, on Plavix type 2 diabetes on Mounjaro, COPD chronic neck and lower back pain treated with spinal cord stimulator, 1/10/2024 revision Medtronic spinal cord stimulator leads and IPG (MRI compatible) per Dr. Stuart who is here for follow-up chronic lumbar radiculopathy radiating to bilateral lower extremities left worse than right..  He is being treated with IV infusion therapy once a month which provides 70 to 80% relief of neuropathic pain for 10 to 15 days depending on activity.  He uses Lyrica 50 mg 3 times a day ketamine lidocaine nasal spray and tizanidine between and infusions to prolong symptom relief.  IV infusion therapy provides significant improvement in neuropathic pain allows him to continue walking 6 miles per day.  He also is consistent with resistant training and body weight exercises along with intermittent fasting.  He continues to  lose weight he has lost almost 100 pounds over the past few years.  His goal weight is 190lbs today he was 202lbs.  Overall he is doing very well.  Of note he uses medical marijuana Gummies at night to help reduce pain and allow him to sleep.  He denies any new neurological constitutional symptoms.  Given the relief IV infusion therapy provides for chronic radicular symptoms plan to continue with IV infusion therapy every month.  Urine drug screen and controlled substance agreement up-to-date, provided refill of pregabalin.  Patient verbalized understanding plan of care.  Encourage patient to continue with current activity levels and working towards his weight loss goals.  Encourage patient to get involved with aquatics and swimming to reduce joint pain and improve flexibility range of motion.  Follow-up in 3 months or sooner if needed.    Procedures:  1/10/2024 Medtronic spinal cord stimulator leads and IPG revision with MRI compatible with the leads staggered at C7-T3  Few IV infusion therapy the patient has had a 80% improvement in pain and function      Portions of record reviewed for pertinent issues: active problem list, medication list, allergies, family history, social history, notes from last encounter, encounters, lab results, imaging and other available records.      I have personally reviewed the OARRS report for this patient. This report is scanned into the electronic medical record. I have considered the risks of abuse, dependence, addiction and diversion. It showed: marijuana from Dr. Shay and pregabalin 50 mg from Dr. Stuart  Opioid agreement: 7/10/2024  Activities of daily living: On disability walks 6 miles per day, intermittent fasting and resistant training  Adverse effects: None  Analgesia: W/O: 7/10, W 4/10    Toxicology screen: 7/10/2024  Aberrant behavior: None    Past Medical History  He has a past medical history of Anxiety, Arthritis, Coronary artery disease, Depression, Diabetes  mellitus (Multi), Diverticulosis, Hiatal hernia, Hypertension, Lumbar disc disease, Myocardial infarction (Multi), Personal history of other diseases of the circulatory system, Personal history of other diseases of the digestive system, Personal history of other diseases of the nervous system and sense organs, Pure hypercholesterolemia, unspecified, and Vision loss.    Surgical History  He has a past surgical history that includes Back surgery (12/19/2014); Hernia repair (12/19/2014); Other surgical history (12/19/2014); Tonsillectomy (12/19/2014); Vasectomy (12/19/2014); Cardiac catheterization; Coronary stent placement; Colonoscopy; and Meniscectomy.     Social History  He reports that he has never smoked. He has never been exposed to tobacco smoke. He has never used smokeless tobacco. He reports that he does not currently use alcohol. He reports current drug use. Drug: Marijuana.    Family History  Family History   Problem Relation Name Age of Onset    Stroke Father      Breast cancer Sister      Stomach cancer Maternal Grandmother          Allergies  Cat dander, Onion, and Hydromorphone    Review of Systems  Review of Systems   Constitutional:  Negative for chills and fatigue.   Respiratory:  Positive for chest tightness. Negative for shortness of breath and wheezing.    Cardiovascular:  Negative for chest pain and palpitations.   Gastrointestinal:  Negative for constipation, diarrhea and nausea.   Genitourinary:  Negative for difficulty urinating and frequency.   Musculoskeletal:  Positive for back pain and myalgias.        Lower back pain rating to bilateral lower extremities left worse than right.  Patient is very active walks 5 to 6 miles per day, continues to do weight training.  Continues to work on weight loss journey.   Neurological:  Positive for numbness. Negative for dizziness and headaches.   Psychiatric/Behavioral:  Negative for agitation, confusion and suicidal ideas.         Physical  "Exam  Physical Exam  Constitutional:       General: He is not in acute distress.     Appearance: Normal appearance.   HENT:      Head: Normocephalic.   Eyes:      Extraocular Movements: Extraocular movements intact.   Musculoskeletal:         General: Tenderness present.      Lumbar back: Tenderness present. Decreased range of motion.   Neurological:      General: No focal deficit present.      Mental Status: He is alert and oriented to person, place, and time.      GCS: GCS eye subscore is 4. GCS verbal subscore is 5. GCS motor subscore is 6.      Cranial Nerves: Cranial nerves 2-12 are intact.      Sensory: Sensation is intact.      Motor: Motor function is intact.      Coordination: Coordination is intact.      Gait: Gait is intact.   Psychiatric:         Mood and Affect: Mood normal.         Behavior: Behavior normal.         Thought Content: Thought content normal.         Judgment: Judgment normal.           Last Recorded Vitals  Blood pressure 91/55, pulse 94, temperature 36.3 °C (97.3 °F), temperature source Tympanic, height 1.727 m (5' 8\"), weight 88.9 kg (196 lb), SpO2 96%.    Reviewed Images  12/6/2023 thoracic spine x-ray  FINDINGS:  A spinal stimulator is noted with the beads extending to the T2  spinous process. This is similar in position compared to prior  radiographs dating back to 2011. Thoracic vertebral body heights are  maintained. No acute compression fracture deformities are noted.  There are minimal discogenic degenerative changes of the midthoracic  spine with mild loss of disc space heights. Soft tissues appear  grossly unremarkable.      IMPRESSION:  1. Redemonstration of spinal stimulator with the leads projecting  over T2 spinous process as described above. This is similar compared  to prior radiographs dating back to 2011.  2. Mild multilevel discogenic degenerative changes of thoracic spine.        Reviewed Labs  Lab Results   Component Value Date    GLUCOSE 105 (H) 05/14/2024    " CALCIUM 9.8 05/14/2024     05/14/2024    K 4.4 05/14/2024    CO2 29 05/14/2024     05/14/2024    BUN 13 05/14/2024    CREATININE 1.29 10/28/2024           Assessment/Plan     Logan Campos is a very pleasant 59 y.o. male recall past medical history of obesity BMI 32, Prinzmetal syndrome, MI x 2, coronary artery stents x 8, on Plavix type 2 diabetes on Mounjaro, COPD chronic neck and lower back pain treated with spinal cord stimulator, 1/10/2024 revision Medtronic spinal cord stimulator leads and IPG (MRI compatible) per Dr. Stuart who is here for follow-up chronic lumbar radiculopathy radiating to bilateral lower extremities left worse than right.  He is receiving IV infusion therapy once a month which provides 70 to 80% relief of neuropathic pain for roughly 12 to 17 days.  He manages his symptoms with Lyrica 50 mg 3 times a day and ketamine lidocaine nasal spray, tizanidine and medical marijuana which provide significant relief of symptoms and allow him to continue progressing with his ADLs and weight loss journey.  Patient is very active, he walks 6 miles per day and is does weight training and body weight exercises 3-5 times a week.  He continues to work towards his weight loss goal of 190lbs.  Today he is 196lbs.  He denies any new neurological constitutional symptoms.  Plan to continue with IV infusion therapy once a month for chronic lumbar radicular symptoms.  Provided refill of ketamine lidocaine nasal spray.  Urine drug screen and controlled substance agreement up-to-date.  Encourage patient to continue with home exercise and stretching regiment.  Plan to continue with IV infusion therapy once a month, new orders placed.  Plan to follow-up in 3 months or sooner if needed.      Plan  At least 50% of the visit was involved in the discussion of the options for treatment. We discussed exercises, medication, interventional therapies and surgery. Healthy life style is essential with patient hard work  to achieve the wellness. In addition; discussion with the patient and/or family about any of the diagnostic results, impressions and/or recommended diagnostic studies, prognosis, risks and benefits of treatment options, instructions for treatment and/or follow-up, importance of compliance with chosen treatment options, risk-factor reduction, and patient/family education.     Plan to continue with IV infusion therapy for lumbar radicular pain once a month.  Pool therapy, walking in the pool, at least 3x per week for 30 minutes  Continue self-directed physical exercise and stretching regiment.  Encouraged to continue setting goals and improving his overall health and wellness.  Healthy lifestyle and anti-inflammatory diet in addition to weight control discussed with the patient  Alternative chronic pain therapies was discussed, encouraged and information was handed  Return to Clinic 3 months or sooner if needed.     *Please note this report has been produced using speech recognition software and may contain errors related to that system including grammar, punctuation and spelling as well as words and phrases that may be inappropriate. If there are questions or concerns, please feel free to contact me to clarify.      I spent 18 minutes in the professional and overall care of this patient.       Zeke Rudd, APRN-CNP

## 2024-12-18 NOTE — PROGRESS NOTES
"Chief Complaint:   6-month Follow-up     History Of Present Illness:    Logan Campos is a 59 y.o. male with PMHx of CAD with hx of x2 MI and x8 coronary stents (on plavix), prinzmetal varian angina, T2DM, COPD, dysmetabolic syndrome X, and chronic back pain presenting today for 6-month follow-up.  Patient reports he has been feeling well since last seen in our office.  He denies any new cardiac symptoms or complaints.  He continues to have occasional chest discomfort that is related to his known Prinzmetal variant angina, he is on Imdur for this and has a neurostimulator.   He denies any palpitations, lightheadedness, dizziness, syncope, orthopnea, PND, lower extremity edema.  He reports he has lost a total of 103 pounds since 2020, and his BMI today is 30.1.  He is physically active and is walking 6 miles/day, which consist of three 2 mile walks/day, and he tells me he also does body weigh exercises.     Last Recorded Vitals:  Vitals:    12/18/24 1538   BP: 115/80   BP Location: Left arm   Patient Position: Sitting   BP Cuff Size: Adult   Pulse: 80   SpO2: 98%   Weight: 89.8 kg (198 lb)   Height: 1.727 m (5' 8\")     Past Medical History:  He has a past medical history of Anxiety, Arthritis, Coronary artery disease, Depression, Diabetes mellitus (Multi), Diverticulosis, Hiatal hernia, Hypertension, Lumbar disc disease, Myocardial infarction (Multi), Personal history of other diseases of the circulatory system, Personal history of other diseases of the digestive system, Personal history of other diseases of the nervous system and sense organs, Pure hypercholesterolemia, unspecified, and Vision loss.    Past Surgical History:  He has a past surgical history that includes Back surgery (12/19/2014); Hernia repair (12/19/2014); Other surgical history (12/19/2014); Tonsillectomy (12/19/2014); Vasectomy (12/19/2014); Cardiac catheterization; Coronary stent placement; Colonoscopy; and Meniscectomy.      Social History:  He " reports that he has never smoked. He has never been exposed to tobacco smoke. He has never used smokeless tobacco. He reports that he does not currently use alcohol. He reports current drug use. Drug: Marijuana.    Family History:  Family History   Problem Relation Name Age of Onset    Stroke Father      Breast cancer Sister      Stomach cancer Maternal Grandmother       Allergies:  Cat dander, Onion, and Hydromorphone    Outpatient Medications:  Current Outpatient Medications   Medication Instructions    aspirin 325 mg tablet 1 tablet, Daily    atorvastatin (LIPITOR) 80 mg, oral, Daily    cholecalciferol (Vitamin D-3) 5,000 Units tablet 1 tablet, Daily    chrm/vineg/bit-orang peel/gr t (APPLE CIDER VINEGAR PLUS ORAL) Daily    clopidogrel (PLAVIX) 75 mg, oral, Daily    dapaglifloz propaned-metformin (Xigduo XR) 5-1,000 mg 1 tablet, oral, Daily, Take with food.    DULoxetine (CYMBALTA) 60 mg, oral, Daily    EPINEPHrine (EpiPen) 0.3 mg/0.3 mL injection syringe 0.3 mL    fish oil concentrate (Omega-3) 120-180 mg capsule 6,000 mg, Daily    hydrOXYzine pamoate (VISTARIL) 50 mg, Once    isosorbide mononitrate ER (IMDUR) 60 mg, oral, Daily    ketamine HCl (ketamine, bulk,) 100 % powder Ketamine 100 mg/mL + lidocaine 40 mg/mL 1 puff 4 times daily as needed, DSP#20 mL x 5 refills    lidocaine HCl, bulk, 100 % powder powder In ketamine nasal spray    losartan (COZAAR) 50 mg, oral, Daily    Mounjaro 7.5 mg, subcutaneous, Every 7 days    Myrbetriq 50 mg, oral, Daily    nitroglycerin (Nitrostat) 0.4 mg SL tablet Place under the tongue.    NON FORMULARY Alive veggie based vitamins    NON FORMULARY Medical Marijuana    oxyCODONE-acetaminophen (Percocet) 5-325 mg tablet 1 tablet, oral, Every 4 hours PRN    pantoprazole (PROTONIX) 40 mg, oral, Daily, Do not crush, chew, or split.    pregabalin (LYRICA) 50 mg, oral, 3 times daily    promethazine (PHENERGAN) 25 mg, oral, Every 6 hours PRN    rOPINIRole (REQUIP) 1 mg, oral, Nightly     sertraline (ZOLOFT) 150 mg, Daily    tamsulosin (FLOMAX) 0.4 mg, oral, Daily    Tiadylt  mg, oral, Daily    tiZANidine (ZANAFLEX) 4 mg, oral, 3 times daily PRN    traZODone (DESYREL) 100 mg, oral, Nightly, Take one half or one whole tablet about 20 min before bedtime    trospium (SANCTURA XR) 60 mg, oral, Daily    VITAMIN B COMPLEX ORAL 1 tablet, Daily     Physical Exam:  General: no acute distress  HEENT: EOMI, no scleral icterus.  Lungs: Clear to auscultation bilaterally without wheezing, rales, or rhonchi.  Cardiovascular: Regular rhythm and rate. Normal S1 and S2. No murmurs, rubs, or gallops are appreciated. JVP normal.  Abdomen: Soft, nontender, nondistended. Bowel sounds present.  Extremities: Warm and well perfused with equal 2+ pulses bilaterally.  No edema.  Neurologic: Alert and oriented x3.      Last Labs:  CBC -  Lab Results   Component Value Date    WBC 9.4 10/28/2024    HGB 14.1 10/28/2024    HCT 42.9 10/28/2024    MCV 93 10/28/2024     10/28/2024     CMP -  Lab Results   Component Value Date    CALCIUM 9.8 05/14/2024    PHOS 3.4 03/07/2023    PROT 6.5 05/14/2024    ALBUMIN 4.3 05/14/2024    AST 19 05/14/2024    ALT 22 05/14/2024    ALKPHOS 89 05/14/2024    BILITOT 0.7 05/14/2024     LIPID PANEL -   Lab Results   Component Value Date    CHOL 100 05/14/2024    TRIG 127 05/14/2024    HDL 35.9 05/14/2024    CHHDL 2.8 05/14/2024    LDLF 57 01/10/2023    VLDL 25 05/14/2024    NHDL 64 05/14/2024     RENAL FUNCTION PANEL -   Lab Results   Component Value Date    GLUCOSE 105 (H) 05/14/2024     05/14/2024    K 4.4 05/14/2024     05/14/2024    CO2 29 05/14/2024    ANIONGAP 13 05/14/2024    BUN 13 05/14/2024    CREATININE 1.29 10/28/2024    GFRMALE 75 03/07/2023    CALCIUM 9.8 05/14/2024    PHOS 3.4 03/07/2023    ALBUMIN 4.3 05/14/2024      Lab Results   Component Value Date    HGBA1C 5.3 10/28/2024    HGBA1C 5.6 06/24/2024     Last Cardiology Tests:  ECG:  ECG 12 lead 01/09/2024  Normal  sinus rhythm  Nonspecific T wave abnormality    Echo:  Transthoracic Echo (TTE) Complete 10/31/2023   1. Left ventricular systolic function is normal with a 60% estimated ejection fraction.   2. Spectral Doppler shows an impaired relaxation pattern of left ventricular diastolic filling.    Cath:  [facility] 3/8/2023  1. Non-obstructive coronary artery disease.     Stress Test:  Nuclear Stress Test 01/18/2023  Abnormal Lexiscan Myoview cardiac perfusion stress test.  Mild small anteroapical myocardial ischemia by perfusion imaging.  No myocardial infarction by perfusion imaging.  Abnormal left ventricular systolic function.  Left ventricular ejection fraction 49 %.  Compared prior study of April 13, 2015 small anteroapical defect  previously described as fixed is now appear reversible which may  suggest artifact, LVEF was previously reported at 63%.    Cardiac Imaging:  Carotid artery duplex 12/21/2022  Right Carotid: Findings are consistent with less than 50% stenosis of the right proximal ICA. No evidence of hemodynamically significant stenosis of the right common carotid artery. The right vertebral artery is patent with antegrade flow.  Left Carotid: Findings are consistent with less than 50% stenosis of the left proximal ICA. No evidence of hemodynamically significant stenosis of the left common carotid artery. The left vertebral artery is patent with antegrade flow.  Right Plaque Morph: The proximal right internal carotid artery demonstrates calcified plaque.  Left Plaque Morph: No plaque identified in the left carotid artery.    I have personally reviewed most recent PCP, cardiology, vascular, studies and/or documentation.      Assessment/Plan   CAD, hx of x2 MI and x8 coronary stents. Non-obstructive coronary artery disease noted on [facility] 3/18/2023.  Continue Plavix, aspirin, and high intensity statin therapy.    Prinzmetal variant angina, he does report occasional symptoms of angina, but reports this is chronic and is  not new.  He did undergo LHC that showed non-obstructive coronary artery disease (3/8/2023). He is also taking isosorbide 60 mg daily.    HTN, well-controlled, 115/80 today.  Continue current medications.    HLD, 5/14/2024 LDL 39, HDL 35.9, trig 127, he is on atorvastatin 80 mg daily.  Goal LDL <55.  Labs with his PCP.    Carotid arthrosclerosis, right internal coronary artery demonstrated calcified plaque on carotid duplex (12/21/2022).  Patient denies any symptoms today.  Will repeat carotid duplex for surveillance.    Chronic back pain, he is s/p 1/10/2024 [employer] spinal cord stimulator leads and does follow-up with pain management.      Follow-up with me in 6 months.    Coreen Byers, MICHAEL-CNP

## 2024-12-19 DIAGNOSIS — Z96.89 SPINAL CORD STIMULATOR STATUS: ICD-10-CM

## 2024-12-19 DIAGNOSIS — G89.0 CENTRAL PAIN SYNDROME: Primary | ICD-10-CM

## 2024-12-19 DIAGNOSIS — G89.29 CHRONIC BILATERAL LOW BACK PAIN WITH LEFT-SIDED SCIATICA: ICD-10-CM

## 2024-12-19 DIAGNOSIS — M54.42 CHRONIC BILATERAL LOW BACK PAIN WITH LEFT-SIDED SCIATICA: ICD-10-CM

## 2024-12-19 PROBLEM — I65.29 CAROTID ATHEROSCLEROSIS: Status: ACTIVE | Noted: 2024-12-19

## 2024-12-19 RX ORDER — KETOROLAC TROMETHAMINE 30 MG/ML
30 INJECTION, SOLUTION INTRAMUSCULAR; INTRAVENOUS ONCE
OUTPATIENT
Start: 2024-12-27 | End: 2024-12-27

## 2024-12-19 RX ORDER — KETAMINE HCL IN NACL, ISO-OSM 100MG/10ML
SYRINGE (ML) INJECTION ONCE
OUTPATIENT
Start: 2024-12-27

## 2024-12-20 ENCOUNTER — APPOINTMENT (OUTPATIENT)
Dept: INFUSION THERAPY | Facility: CLINIC | Age: 59
End: 2024-12-20
Payer: COMMERCIAL

## 2024-12-21 DIAGNOSIS — G89.0 CENTRAL PAIN SYNDROME: ICD-10-CM

## 2024-12-23 RX ORDER — TIZANIDINE 4 MG/1
4 TABLET ORAL 3 TIMES DAILY PRN
Qty: 90 TABLET | Refills: 11 | Status: SHIPPED | OUTPATIENT
Start: 2024-12-23

## 2024-12-26 ENCOUNTER — OFFICE VISIT (OUTPATIENT)
Dept: URGENT CARE | Age: 59
End: 2024-12-26
Payer: COMMERCIAL

## 2024-12-26 ENCOUNTER — TELEPHONE (OUTPATIENT)
Dept: PRIMARY CARE | Facility: CLINIC | Age: 59
End: 2024-12-26
Payer: COMMERCIAL

## 2024-12-26 VITALS
OXYGEN SATURATION: 98 % | DIASTOLIC BLOOD PRESSURE: 71 MMHG | TEMPERATURE: 97.1 F | RESPIRATION RATE: 16 BRPM | WEIGHT: 195 LBS | SYSTOLIC BLOOD PRESSURE: 110 MMHG | HEART RATE: 87 BPM | BODY MASS INDEX: 29.65 KG/M2

## 2024-12-26 DIAGNOSIS — R05.1 ACUTE COUGH: ICD-10-CM

## 2024-12-26 DIAGNOSIS — H93.19 TINNITUS, UNSPECIFIED LATERALITY: ICD-10-CM

## 2024-12-26 DIAGNOSIS — H66.019 ACUTE SUPPURATIVE OTITIS MEDIA WITH SPONTANEOUS RUPTURE OF EAR DRUM, RECURRENCE NOT SPECIFIED, UNSPECIFIED LATERALITY: Primary | ICD-10-CM

## 2024-12-26 DIAGNOSIS — R09.82 PND (POST-NASAL DRIP): ICD-10-CM

## 2024-12-26 DIAGNOSIS — H66.011 NON-RECURRENT ACUTE SUPPURATIVE OTITIS MEDIA OF RIGHT EAR WITH SPONTANEOUS RUPTURE OF TYMPANIC MEMBRANE: Primary | ICD-10-CM

## 2024-12-26 PROCEDURE — 3044F HG A1C LEVEL LT 7.0%: CPT

## 2024-12-26 PROCEDURE — 3078F DIAST BP <80 MM HG: CPT

## 2024-12-26 PROCEDURE — 3061F NEG MICROALBUMINURIA REV: CPT

## 2024-12-26 PROCEDURE — 99203 OFFICE O/P NEW LOW 30 MIN: CPT

## 2024-12-26 PROCEDURE — 3048F LDL-C <100 MG/DL: CPT

## 2024-12-26 PROCEDURE — 3074F SYST BP LT 130 MM HG: CPT

## 2024-12-26 PROCEDURE — 4010F ACE/ARB THERAPY RXD/TAKEN: CPT

## 2024-12-26 PROCEDURE — 1036F TOBACCO NON-USER: CPT

## 2024-12-26 RX ORDER — BENZONATATE 200 MG/1
200 CAPSULE ORAL 3 TIMES DAILY PRN
Qty: 21 CAPSULE | Refills: 0 | Status: SHIPPED | OUTPATIENT
Start: 2024-12-26 | End: 2025-01-02

## 2024-12-26 RX ORDER — CETIRIZINE HYDROCHLORIDE 10 MG/1
10 TABLET ORAL DAILY
Qty: 30 TABLET | Refills: 0 | Status: SHIPPED | OUTPATIENT
Start: 2024-12-26 | End: 2025-01-25

## 2024-12-26 RX ORDER — FLUTICASONE PROPIONATE 50 MCG
1 SPRAY, SUSPENSION (ML) NASAL DAILY
Qty: 16 G | Refills: 0 | Status: SHIPPED | OUTPATIENT
Start: 2024-12-26 | End: 2025-01-25

## 2024-12-26 RX ORDER — AMOXICILLIN 875 MG/1
875 TABLET, FILM COATED ORAL 2 TIMES DAILY
Qty: 14 TABLET | Refills: 0 | Status: SHIPPED | OUTPATIENT
Start: 2024-12-26 | End: 2025-01-02

## 2024-12-26 ASSESSMENT — ENCOUNTER SYMPTOMS
PSYCHIATRIC NEGATIVE: 1
NEUROLOGICAL NEGATIVE: 1
MUSCULOSKELETAL NEGATIVE: 1
SINUS PRESSURE: 1
GASTROINTESTINAL NEGATIVE: 1
EYES NEGATIVE: 1
CONSTITUTIONAL NEGATIVE: 1
RESPIRATORY NEGATIVE: 1
SINUS PAIN: 0
CARDIOVASCULAR NEGATIVE: 1
DIZZINESS: 0
HEADACHES: 0
LIGHT-HEADEDNESS: 0

## 2024-12-26 NOTE — TELEPHONE ENCOUNTER
Also, he can try Dr Maury Fox who is in network for , he is in [place]. His number is [phone] . I can fax a copy of ENT referral to dr Fox if needed.

## 2024-12-26 NOTE — TELEPHONE ENCOUNTER
Patient called and states that he went to Urgent Care today and they told him that he has a sinus infection and his right ear is bleeding. They advised him to call his family doctor about the ear bleeding which may be the eardrum.      Logan Campos  [phone]

## 2024-12-26 NOTE — TELEPHONE ENCOUNTER
Spoke with Logan he stated:    He is having pain in the affected ear, 7 out of 10 on pain scale  Constant ringing and is still bleeding  Is taking Motrin and his Medicinal Marijuana   Would like ENT referral possibly stat.

## 2024-12-26 NOTE — PROGRESS NOTES
Subjective   Patient ID: Logan Campos is a 59 y.o. male. They present today with a chief complaint of Nasal Congestion (Congestion for 2 days, right ear bleeding/Negative covid at home).    History of Present Illness  2 days of R ear pain and last felt relief and had blood out of R ear canal. Has not happened before. Has tried mucinex and dayqil with mild relief.  Came in for evaluation. Patient denies any CP, SOB, HA, fever, abdominal pain, nausea/vomiting, and headache otherwise.       History provided by:  Patient      Past Medical History  Allergies as of 12/26/2024 - Reviewed 12/26/2024   Allergen Reaction Noted    Cat dander Shortness of breath 01/30/2023    Onion Anaphylaxis 01/30/2023    Hydromorphone Rash and Other 01/30/2023       (Not in a hospital admission)       Past Medical History:   Diagnosis Date    Anxiety     Arthritis     Coronary artery disease     Depression     Diabetes mellitus (Multi)     Diverticulosis     Hiatal hernia     Hypertension     Lumbar disc disease     Myocardial infarction (Multi)     Personal history of other diseases of the circulatory system     History of coronary atherosclerosis    Personal history of other diseases of the digestive system     History of gastroesophageal reflux (GERD)    Personal history of other diseases of the nervous system and sense organs     History of neuropathy    Pure hypercholesterolemia, unspecified     High cholesterol    Vision loss        Past Surgical History:   Procedure Laterality Date    BACK SURGERY  12/19/2014    Back Surgery    CARDIAC CATHETERIZATION      COLONOSCOPY      CORONARY STENT PLACEMENT      HERNIA REPAIR  12/19/2014    Hernia Repair    MENISCECTOMY      OTHER SURGICAL HISTORY  12/19/2014    Arterial Catheterization    TONSILLECTOMY  12/19/2014    Tonsillectomy    VASECTOMY  12/19/2014    Surgery Vas Deferens Vasectomy        reports that he has never smoked. He has never been exposed to tobacco smoke. He has never used  smokeless tobacco. He reports that he does not currently use alcohol. He reports current drug use. Drug: Marijuana.    Review of Systems  Review of Systems   Constitutional: Negative.    HENT:  Positive for ear discharge, ear pain, postnasal drip, sinus pressure and tinnitus. Negative for sinus pain.    Eyes: Negative.    Respiratory: Negative.     Cardiovascular: Negative.    Gastrointestinal: Negative.    Musculoskeletal: Negative.    Skin: Negative.    Neurological: Negative.  Negative for dizziness, syncope, light-headedness and headaches.   Psychiatric/Behavioral: Negative.     All other systems reviewed and are negative.                                 Objective    Vitals:    12/26/24 1001   BP: 110/71   Pulse: 87   Resp: 16   Temp: 36.2 °C (97.1 °F)   SpO2: 98%   Weight: 88.5 kg (195 lb)     No LMP for male patient.    Physical Exam  Vitals and nursing note reviewed.   Constitutional:       General: He is not in acute distress.     Appearance: Normal appearance. He is not ill-appearing.   HENT:      Head: Normocephalic and atraumatic.      Right Ear: Decreased hearing noted. Drainage present. There is no impacted cerumen. No foreign body. Tympanic membrane is perforated.      Left Ear: Tympanic membrane and ear canal normal.      Nose: Nose normal.      Mouth/Throat:      Mouth: Mucous membranes are moist.      Pharynx: Oropharynx is clear. Posterior oropharyngeal erythema present.   Eyes:      Extraocular Movements: Extraocular movements intact.      Pupils: Pupils are equal, round, and reactive to light.   Cardiovascular:      Rate and Rhythm: Normal rate and regular rhythm.      Heart sounds: Normal heart sounds.   Pulmonary:      Effort: Pulmonary effort is normal.      Breath sounds: Normal breath sounds.   Abdominal:      General: Bowel sounds are normal.   Skin:     General: Skin is warm and dry.   Neurological:      Mental Status: He is alert and oriented to person, place, and time.   Psychiatric:          Mood and Affect: Mood normal.         Behavior: Behavior normal.         Procedures    Point of Care Test & Imaging Results from this visit  No results found for this visit on 12/26/24.   No results found.    Diagnostic study results (if any) were reviewed by AMOS Melchor.    Assessment/Plan   Allergies, medications, history, and pertinent labs/EKGs/Imaging reviewed by AMOS Melchor.     Medical Decision Making  Pos blood in R ear, R TM rupture. Will send abx to treat since middle ear is open. ENT referral given for evaluation asap. Sending cetrizine and Flonase for PND. Benzonatate for cough. Discussed pushing fluids, rest, OTC symptoms management PRN. Patient verbalized understanding and agreed with the plan of care.    Orders and Diagnoses  Diagnoses and all orders for this visit:  Non-recurrent acute suppurative otitis media of right ear with spontaneous rupture of tympanic membrane  -     amoxicillin (Amoxil) 875 mg tablet; Take 1 tablet (875 mg) by mouth 2 times a day for 7 days.  -     Referral to ENT; Future  PND (post-nasal drip)  -     fluticasone (Flonase) 50 mcg/actuation nasal spray; Administer 1 spray into each nostril once daily. Shake gently. Before first use, prime pump. After use, clean tip and replace cap.  -     benzonatate (Tessalon) 200 mg capsule; Take 1 capsule (200 mg) by mouth 3 times a day as needed for cough for up to 7 days. Do not crush or chew.  Acute cough  -     cetirizine (ZyrTEC) 10 mg tablet; Take 1 tablet (10 mg) by mouth once daily.      Medical Admin Record      Patient disposition: Home    Electronically signed by AMOS Melchor  3:24 PM

## 2024-12-26 NOTE — TELEPHONE ENCOUNTER
Spoke with Logan and updated on Dr Gilman's message/recommendations.    Numbers were provided for scheduling and asked to left office know if ENT referral needs to be faxed.

## 2024-12-27 ENCOUNTER — INFUSION (OUTPATIENT)
Dept: INFUSION THERAPY | Facility: CLINIC | Age: 59
End: 2024-12-27
Payer: COMMERCIAL

## 2024-12-27 VITALS
TEMPERATURE: 96.8 F | RESPIRATION RATE: 18 BRPM | SYSTOLIC BLOOD PRESSURE: 93 MMHG | OXYGEN SATURATION: 94 % | HEART RATE: 85 BPM | DIASTOLIC BLOOD PRESSURE: 58 MMHG

## 2024-12-27 DIAGNOSIS — G89.0 CENTRAL PAIN SYNDROME: ICD-10-CM

## 2024-12-27 DIAGNOSIS — Z96.89 SPINAL CORD STIMULATOR STATUS: ICD-10-CM

## 2024-12-27 DIAGNOSIS — G89.29 CHRONIC BILATERAL LOW BACK PAIN WITH LEFT-SIDED SCIATICA: ICD-10-CM

## 2024-12-27 DIAGNOSIS — M54.42 CHRONIC BILATERAL LOW BACK PAIN WITH LEFT-SIDED SCIATICA: ICD-10-CM

## 2024-12-27 PROCEDURE — 2500000004 HC RX 250 GENERAL PHARMACY W/ HCPCS (ALT 636 FOR OP/ED): Performed by: NURSE PRACTITIONER

## 2024-12-27 PROCEDURE — 96365 THER/PROPH/DIAG IV INF INIT: CPT | Mod: INF

## 2024-12-27 PROCEDURE — 96368 THER/DIAG CONCURRENT INF: CPT | Mod: INF

## 2024-12-27 PROCEDURE — 96375 TX/PRO/DX INJ NEW DRUG ADDON: CPT | Mod: INF

## 2024-12-27 RX ORDER — METOPROLOL TARTRATE 25 MG/1
25 TABLET, FILM COATED ORAL ONCE
OUTPATIENT
Start: 2025-01-26 | End: 2025-01-26

## 2024-12-27 RX ORDER — KETOROLAC TROMETHAMINE 30 MG/ML
30 INJECTION, SOLUTION INTRAMUSCULAR; INTRAVENOUS ONCE
OUTPATIENT
Start: 2025-01-26 | End: 2025-01-26

## 2024-12-27 RX ORDER — KETAMINE HCL IN NACL, ISO-OSM 100MG/10ML
SYRINGE (ML) INJECTION ONCE
Status: COMPLETED | OUTPATIENT
Start: 2024-12-27 | End: 2024-12-27

## 2024-12-27 RX ORDER — DIPHENHYDRAMINE HYDROCHLORIDE 50 MG/ML
25 INJECTION INTRAMUSCULAR; INTRAVENOUS ONCE
OUTPATIENT
Start: 2025-01-26 | End: 2025-01-26

## 2024-12-27 RX ORDER — ONDANSETRON HYDROCHLORIDE 2 MG/ML
4 INJECTION, SOLUTION INTRAVENOUS ONCE
OUTPATIENT
Start: 2025-01-26 | End: 2025-01-26

## 2024-12-27 RX ORDER — KETAMINE HCL IN NACL, ISO-OSM 100MG/10ML
SYRINGE (ML) INJECTION ONCE
OUTPATIENT
Start: 2025-01-26

## 2024-12-27 RX ORDER — METOCLOPRAMIDE HYDROCHLORIDE 5 MG/ML
10 INJECTION INTRAMUSCULAR; INTRAVENOUS ONCE
OUTPATIENT
Start: 2025-01-26 | End: 2025-01-26

## 2024-12-27 RX ORDER — EPINEPHRINE 0.3 MG/.3ML
0.3 INJECTION SUBCUTANEOUS EVERY 5 MIN PRN
OUTPATIENT
Start: 2025-01-26

## 2024-12-27 RX ORDER — ALBUTEROL SULFATE 0.83 MG/ML
3 SOLUTION RESPIRATORY (INHALATION) AS NEEDED
OUTPATIENT
Start: 2025-01-26

## 2024-12-27 RX ORDER — DIPHENHYDRAMINE HYDROCHLORIDE 50 MG/ML
50 INJECTION INTRAMUSCULAR; INTRAVENOUS AS NEEDED
OUTPATIENT
Start: 2025-01-26

## 2024-12-27 RX ORDER — KETOROLAC TROMETHAMINE 30 MG/ML
30 INJECTION, SOLUTION INTRAMUSCULAR; INTRAVENOUS ONCE
Status: COMPLETED | OUTPATIENT
Start: 2024-12-27 | End: 2024-12-27

## 2024-12-27 RX ORDER — NITROGLYCERIN 0.4 MG/1
0.4 TABLET SUBLINGUAL ONCE
OUTPATIENT
Start: 2025-01-26 | End: 2025-01-26

## 2024-12-27 RX ORDER — FAMOTIDINE 10 MG/ML
20 INJECTION INTRAVENOUS ONCE AS NEEDED
OUTPATIENT
Start: 2025-01-26

## 2024-12-27 ASSESSMENT — PAIN SCALES - GENERAL: PAINLEVEL_OUTOF10: 7

## 2024-12-27 ASSESSMENT — ENCOUNTER SYMPTOMS
DEPRESSION: 0
LOSS OF SENSATION IN FEET: 1
OCCASIONAL FEELINGS OF UNSTEADINESS: 0

## 2024-12-27 NOTE — PATIENT INSTRUCTIONS
Today :We administered ketamine 30 mg-lidocaine 300 mg, propofol, and ketorolac.     For:   1. Central pain syndrome    2. Chronic bilateral low back pain with left-sided sciatica    3. Spinal cord stimulator status            (Tell all doctors including dentists that you are taking this medication)     Go to the emergency room or call 911 if:  -You have signs of allergic reaction:   -Rash, hives, itching.   -Swollen, blistered, peeling skin.   -Swelling of face, lips, mouth, tongue or throat.   -Tightness of chest, trouble breathing, swallowing or talking     Call your doctor:  - If IV / injection site gets red, warm, swollen, itchy or leaks fluid or pus.     (Leave dressing on your IV site for at least 2 hours and keep area clean and dry  - If you get sick or have symptoms of infection or are not feeling well for any reason.    (Wash your hands often, stay away from people who are sick)  - If you have side effects from your medication that do not go away or are bothersome.     (Refer to the teaching your nurse gave you for side effects to call your doctor about)    - Common side effects may include:  stuffy nose, headache, feeling tired, muscle aches, upset stomach  - Before receiving any vaccines     - Call the [facility] at   If:  - You get sick, are on antibiotics, have had a recent vaccine, have surgery or dental work and your doctor wants your visit rescheduled.  - You need to cancel and reschedule your visit for any reason. Call at least 2 days before your visit if you need to cancel.   - Your insurance changes before your next visit.    (We will need to get approval from your new insurance. This can take up to two weeks.)     The [facility] is opened Monday thru Friday. We are closed on weekends and holidays.   Voice mail will take your call if the center is closed. If you leave a message please allow 24 hours for a call back during weekdays. If you leave a message on a  weekend/holiday, we will call you back the next business day.    A pharmacist is available Monday - Friday from 8:30AM to 3:30PM to help answer any questions you may have about your prescriptions(s). Please call pharmacy at:    [facility]: [phone]  [facility]: [phone]  [facility]: [phone]              [facility]      Pain Infusion Aftercare Instructions      1. It is normal to feel sedated, tired and low in energy after a pain infusion. DO NOT DRIVE, OPERATE ANY MACHINERY, OR MAKE ANY IMPORTANT DECISIONS FOR AT LEAST 24 HOURS AFTER THE INFUSION.     2. Call the pain center at [phone] with any problems, questions, or concerns.     3. Eat light after the infusion. If you feel queasy or sick to your stomach, laying down with your eyes closed may help. When you resume eating start with something mild like clear liquids, yogurt, applesauce, crackers, etc… Gradually advance to a regular diet.     4. Do not leave your house alone the evening of your pain infusion.     5. No alcohol or sedative medications, such as sleeping pills, for 24 hours after your pain infusion.     6. Resume all other prescribed medications unless directed otherwise by you physician.     7. If you have any medical emergencies, call 911 or go directly to the closest emergency room.

## 2024-12-27 NOTE — PROGRESS NOTES
S: Patient here for 20th opioid sparing pain infusion. Patient reports 75% reduction in pain after last infusion that lasted 19 days.    Purpose of pain infusion meds explained along with potential side effects.  Patient verbalized understanding.    B: Pain Issues: low back pain    A: Patient currently has pain described on flow sheet documentation. Designated  is Paulo. Patient last ate solid food 12 hours ago, and had liquid 1 hours ago.    Post infusion teaching provided. Patient verbalized understanding. VSS, Patient states pain is 2/10. Will assist patient to waiting car via wheelchair.  R: Plan; Obtain IV access, do patient risk assessment, and start opioid sparing infusion as ordered. Monitoring for S/S of adverse reactions.

## 2025-01-06 ENCOUNTER — HOSPITAL ENCOUNTER (OUTPATIENT)
Dept: VASCULAR MEDICINE | Facility: CLINIC | Age: 60
Discharge: HOME | End: 2025-01-06
Payer: COMMERCIAL

## 2025-01-06 DIAGNOSIS — I65.23 OCCLUSION AND STENOSIS OF BILATERAL CAROTID ARTERIES: ICD-10-CM

## 2025-01-06 DIAGNOSIS — I65.29 CAROTID ATHEROSCLEROSIS, UNSPECIFIED LATERALITY: ICD-10-CM

## 2025-01-06 PROCEDURE — 93880 EXTRACRANIAL BILAT STUDY: CPT | Performed by: SURGERY

## 2025-01-06 PROCEDURE — 93880 EXTRACRANIAL BILAT STUDY: CPT

## 2025-01-07 DIAGNOSIS — R10.9 ABDOMINAL PAIN, UNSPECIFIED ABDOMINAL LOCATION: ICD-10-CM

## 2025-01-09 RX ORDER — PANTOPRAZOLE SODIUM 40 MG/1
40 TABLET, DELAYED RELEASE ORAL DAILY
Qty: 90 TABLET | Refills: 3 | Status: SHIPPED | OUTPATIENT
Start: 2025-01-09

## 2025-01-14 ENCOUNTER — APPOINTMENT (OUTPATIENT)
Dept: AUDIOLOGY | Facility: CLINIC | Age: 60
End: 2025-01-14
Payer: COMMERCIAL

## 2025-01-14 DIAGNOSIS — H90.A31 MIXED CONDUCTIVE AND SENSORINEURAL HEARING LOSS OF RIGHT EAR WITH RESTRICTED HEARING OF LEFT EAR: Primary | ICD-10-CM

## 2025-01-14 DIAGNOSIS — H93.13 TINNITUS OF BOTH EARS: ICD-10-CM

## 2025-01-14 DIAGNOSIS — H69.93 EUSTACHIAN TUBE DYSFUNCTION, BILATERAL: ICD-10-CM

## 2025-01-14 PROCEDURE — 92557 COMPREHENSIVE HEARING TEST: CPT | Performed by: AUDIOLOGIST

## 2025-01-14 PROCEDURE — 92567 TYMPANOMETRY: CPT | Performed by: AUDIOLOGIST

## 2025-01-14 NOTE — PROGRESS NOTES
COMPREHENSIVE AUDIOMETRIC EVALUATION      Name:  Logan Campos  :  1965  Age:  59 y.o.  Date of Evaluation:  25   Referring Provider:  Andrew Jenkins PA-C     History:  Mr. Campos was seen today for an evaluation of hearing.  Patient reported right tympanic membrane perforation on Abby following blowing his nose/sneezing resulting in bleeding from his right ear.  Patient reported otalgia and decreased hearing sensitivity.  He additionally reported dizziness following right otologic event.  When asked, patient denied left otologic involvement.    See audiometric evaluation at end of this report or scanned under media tab    OTOSCOPY:       Right Ear: Scabbing near tympanic membrane       Left Ear: Clear canal    226 Hz TYMPANOMETRY:       Right Ear: Type As: low compliance, normal peak pressure and normal ear canal volume, which may be consistent with eustachian tube dysfunction       Left Ear: Type As: low compliance, normal peak pressure and normal ear canal volume, which may be consistent with eustachian tube dysfunction    AUDIOMETRIC EVALUATION (Phones):       Right Ear: Profound rising to moderately severe peak at 1500 Hz sloping to Profound, Mixed hearing loss                 Left Ear: Normal through 2000 Hz sloping to Moderately severe, Sensorineural hearing loss           Test technique:  Standard Audiometry  Reliability:   good/fair    SPEECH RECOGNITION THRESHOLD:       Right Ear:  80 dBHL in good agreement with PTA       Left Ear:  15 dBHL in good agreement with PTA    WORD RECOGNITION:       Right Ear:  90%  excellent (%) at elevated presentation level       Left Ear:   100%  excellent (%) at elevated presentation level    ACOUSTIC REFLEXES: Did not test due to patient report of pain/discomfort with tympanometry    DISCUSSION:   Discussed results and recommendations with patient.  Questions were addressed and the patient was encouraged to contact our department should  concerns arise.    RECOMMENDATIONS:  -Recommend patient return following medical management for repeated audiometric evaluation.    Serge Clement, CCC-A     BRYANT Zhou.  [facility] [employer] Student    Appt: 3:30 - 4:00 PM

## 2025-01-19 ASSESSMENT — ENCOUNTER SYMPTOMS
ABDOMINAL PAIN: 0
RHINORRHEA: 0
NECK PAIN: 0
SORE THROAT: 0
VOMITING: 0
HEADACHES: 1
COUGH: 0
DIARRHEA: 0

## 2025-01-20 ENCOUNTER — APPOINTMENT (OUTPATIENT)
Dept: OTOLARYNGOLOGY | Facility: CLINIC | Age: 60
End: 2025-01-20
Payer: COMMERCIAL

## 2025-01-20 VITALS — DIASTOLIC BLOOD PRESSURE: 70 MMHG | HEART RATE: 98 BPM | SYSTOLIC BLOOD PRESSURE: 100 MMHG

## 2025-01-20 DIAGNOSIS — H66.011 NON-RECURRENT ACUTE SUPPURATIVE OTITIS MEDIA OF RIGHT EAR WITH SPONTANEOUS RUPTURE OF TYMPANIC MEMBRANE: ICD-10-CM

## 2025-01-20 DIAGNOSIS — H61.23 BILATERAL IMPACTED CERUMEN: ICD-10-CM

## 2025-01-20 DIAGNOSIS — H91.21 SUDDEN RIGHT HEARING LOSS: ICD-10-CM

## 2025-01-20 DIAGNOSIS — R42 DIZZINESS AND GIDDINESS: ICD-10-CM

## 2025-01-20 DIAGNOSIS — H90.3 ASYMMETRICAL SENSORINEURAL HEARING LOSS: Primary | ICD-10-CM

## 2025-01-20 PROCEDURE — 4010F ACE/ARB THERAPY RXD/TAKEN: CPT | Performed by: PHYSICIAN ASSISTANT

## 2025-01-20 PROCEDURE — 1036F TOBACCO NON-USER: CPT | Performed by: PHYSICIAN ASSISTANT

## 2025-01-20 PROCEDURE — 69210 REMOVE IMPACTED EAR WAX UNI: CPT | Performed by: PHYSICIAN ASSISTANT

## 2025-01-20 PROCEDURE — 3074F SYST BP LT 130 MM HG: CPT | Performed by: PHYSICIAN ASSISTANT

## 2025-01-20 PROCEDURE — 3078F DIAST BP <80 MM HG: CPT | Performed by: PHYSICIAN ASSISTANT

## 2025-01-20 PROCEDURE — 99203 OFFICE O/P NEW LOW 30 MIN: CPT | Performed by: PHYSICIAN ASSISTANT

## 2025-01-20 RX ORDER — HYDROXYZINE HYDROCHLORIDE 25 MG/1
25 TABLET, FILM COATED ORAL
COMMUNITY
Start: 2024-02-16

## 2025-01-20 RX ORDER — SERTRALINE HYDROCHLORIDE 100 MG/1
100 TABLET, FILM COATED ORAL DAILY
COMMUNITY
Start: 2025-01-19

## 2025-01-20 RX ORDER — PREDNISONE 20 MG/1
20 TABLET ORAL DAILY
Qty: 25 TABLET | Refills: 0 | Status: SHIPPED | OUTPATIENT
Start: 2025-01-20 | End: 2025-02-03

## 2025-01-20 ASSESSMENT — ENCOUNTER SYMPTOMS
SORE THROAT: 0
NECK PAIN: 0
RHINORRHEA: 0
HEADACHES: 1
COUGH: 0
ABDOMINAL PAIN: 0
DIARRHEA: 0
VOMITING: 0

## 2025-01-20 NOTE — PROGRESS NOTES
Logan Campos is a 59 y.o. year old male patient with Ear Injury (Ruptured of tympanic membrane on mely  )     The patient presents to the office today for assessment of his right ear.  Patient reports that around Mely porsche he had nasal congestion with right ear pain and pressure.  He states that he blew his nose on Mely and states that he felt a popping phenomenon in the right ear with some blood present in the right ear canal.  He went to the urgent care and was diagnosed with suspected otitis media with perforation.  He was treated with amoxicillin and referred for ENT evaluation.  Patient denies any current bleeding or pain with states that the right ear is significantly diminished with tinnitus.  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  Audiogram was completed prior to today's visit which demonstrated left ear normal hearing through 2000 Hz with mild sloping to moderately severe sloping sensorineural hearing loss.  The right ear is noted for profound rising to moderate mixed hearing loss through 1500 Hz sloping to profound.  He does have associated dizziness which she states is improved but still present.  Dizziness is described as imbalance mostly when he is up and ambulating.  All other ENT concerns are negative.      Review of Systems   HENT:  Positive for ear pain and hearing loss. Negative for ear discharge, rhinorrhea and sore throat.    Respiratory:  Negative for cough.    Gastrointestinal:  Negative for abdominal pain, diarrhea and vomiting.   Musculoskeletal:  Negative for neck pain.   Skin:  Negative for rash.   Neurological:  Positive for headaches.   All other systems reviewed and are negative.        Physical Exam:   General appearance: No acute distress. Normal facies. Symmetric facial movement. No gross lesions of the face are noted.  The external ear structures appear normal.  Patient noted for  bilateral cerumen.  I did not appreciate dried blood in the right ear canal today.  Both ears debrided under the otomicroscope with the use of Pretty needle and alligator forceps.  Following removal of cerumen the ear canals patent and the tympanic membranes are intact without evidence of air-fluid levels, retraction, or congenital defects.  Anterior rhinoscopy notes essentially a midline nasal septum. Examination is noted for normal healthy mucosal membranes without any evidence of lesions, polyps, or exudate. The tongue is normally mobile. There are no lesions on the gingiva, buccal, or oral mucosa. There are no oral cavity masses.  The neck is negative for mass lymphadenopathy. The trachea and parotid are clear. The thyroid bed is grossly unremarkable. The salivary gland structures are grossly unremarkable.    Procedure:  Patient noted for bilateral cerumen.  I did not appreciate dried blood in the right ear canal today.  Both ears debrided under the otomicroscope with the use of Pretty needle and alligator forceps.     Assessment/Plan   1.  Cerumen impaction  2.  Asymmetric sensorineural hearing loss  3.  Sudden hearing loss right ear    Patient was seen in the office today for follow-up on right ear.  Patient with suspected tympanic membrane perforation right within the last month as well as diminished hearing and balance and audiogram demonstrating asymmetric loss.  Physical exam showed bilateral cerumen today which was removed.  The right ear was noted to have no improvement despite removal of cerumen.  The middle ear space appears well.  Audiogram demonstrates a mixed loss involving the right ear with profound rising to moderate sloping to profound hearing loss in the right ear.  Patient at this time is to be treated like a sudden hearing loss in this case due to sudden onset of symptoms and dizziness.  The patient is a diabetic but states that he is under good control and has tolerated prednisone in the past  therefore will be placed on steroid taper.  The patient will also be set up for MRI IAC for further assessment.  He does have a neurostimulator according to his history which he states is MRI compatible.  This was made aware on the patient's MRI order.  The patient will return next week Friday with Dr. Bautista for f/up which may entail the possibility of intratympanic injection of steroid.    All questions and concerns were answered and addressed. The patient expresses understanding and will follow up as advised.    Thank you again for allowing us to participate in the care of this patient.  Answers submitted by the patient for this visit:  Ear Pain Questionnaire (Submitted on 1/19/2025)  Chief Complaint: Ear pain  Affected ear: right  Chronicity: new  Onset: 1 to 4 weeks ago  Progression since onset: waxing and waning  Frequency: constantly  Fever: no fever  Pain - numeric: 5/10

## 2025-01-30 ENCOUNTER — APPOINTMENT (OUTPATIENT)
Dept: INFUSION THERAPY | Facility: CLINIC | Age: 60
End: 2025-01-30
Payer: COMMERCIAL

## 2025-01-31 ENCOUNTER — APPOINTMENT (OUTPATIENT)
Dept: OTOLARYNGOLOGY | Facility: CLINIC | Age: 60
End: 2025-01-31
Payer: COMMERCIAL

## 2025-02-03 ENCOUNTER — APPOINTMENT (OUTPATIENT)
Dept: OTOLARYNGOLOGY | Facility: CLINIC | Age: 60
End: 2025-02-03
Payer: COMMERCIAL

## 2025-02-03 VITALS — BODY MASS INDEX: 29.55 KG/M2 | WEIGHT: 195 LBS | HEIGHT: 68 IN

## 2025-02-03 DIAGNOSIS — H93.13 TINNITUS OF BOTH EARS: ICD-10-CM

## 2025-02-03 DIAGNOSIS — H90.3 ASYMMETRICAL SENSORINEURAL HEARING LOSS: ICD-10-CM

## 2025-02-03 DIAGNOSIS — R42 VERTIGO: ICD-10-CM

## 2025-02-03 DIAGNOSIS — H91.21 SUDDEN RIGHT HEARING LOSS: Primary | ICD-10-CM

## 2025-02-03 PROCEDURE — 3008F BODY MASS INDEX DOCD: CPT | Performed by: STUDENT IN AN ORGANIZED HEALTH CARE EDUCATION/TRAINING PROGRAM

## 2025-02-03 PROCEDURE — 4010F ACE/ARB THERAPY RXD/TAKEN: CPT | Performed by: STUDENT IN AN ORGANIZED HEALTH CARE EDUCATION/TRAINING PROGRAM

## 2025-02-03 PROCEDURE — 99214 OFFICE O/P EST MOD 30 MIN: CPT | Performed by: STUDENT IN AN ORGANIZED HEALTH CARE EDUCATION/TRAINING PROGRAM

## 2025-02-03 PROCEDURE — 69801 INCISE INNER EAR: CPT | Performed by: STUDENT IN AN ORGANIZED HEALTH CARE EDUCATION/TRAINING PROGRAM

## 2025-02-03 PROCEDURE — 1036F TOBACCO NON-USER: CPT | Performed by: STUDENT IN AN ORGANIZED HEALTH CARE EDUCATION/TRAINING PROGRAM

## 2025-02-03 NOTE — PROGRESS NOTES
HPI  Logan Campos is a 59 y.o. male presenting for evaluation of right hearing loss.  Reports developing right sudden hearing loss on [place] Day, this was associated with right aural fullness and brief episodes of dizziness/vertigo lasting less than a minute.  Reports a long history of bilateral tinnitus which worsened on the right.   States dizziness/vertigo is improving.  Currently completing a prednisone course without significant improvement of his hearing loss.   Denies current otalgia, otorrhea, autophony or history of ear surgeries.  Denies focal neurologic deficits or headaches.       Past Medical History:   Diagnosis Date    Anxiety     Arthritis     Coronary artery disease     Depression     Diabetes mellitus (Multi)     Diverticulosis     Dizziness 12/25/2024    Ear pain 2024    Hiatal hernia     HL (hearing loss) 12/25/2024    Hypertension     Lumbar disc disease     Myocardial infarction (Multi)     Personal history of other diseases of the circulatory system     History of coronary atherosclerosis    Personal history of other diseases of the digestive system     History of gastroesophageal reflux (GERD)    Personal history of other diseases of the nervous system and sense organs     History of neuropathy    Pure hypercholesterolemia, unspecified     High cholesterol    Tinnitus 1992    Vision loss        Past Surgical History:   Procedure Laterality Date    ADENOIDECTOMY  1973    BACK SURGERY  12/19/2014    Back Surgery    CARDIAC CATHETERIZATION      COLONOSCOPY      CORONARY STENT PLACEMENT      HERNIA REPAIR  12/19/2014    Hernia Repair    MENISCECTOMY      OTHER SURGICAL HISTORY  12/19/2014    Arterial Catheterization    TONSILLECTOMY  12/19/2014    Tonsillectomy    VASECTOMY  12/19/2014    Surgery Vas Deferens Vasectomy         Current Outpatient Medications on File Prior to Visit   Medication Sig Dispense Refill    aspirin 325 mg tablet Take 1 tablet (325 mg) by mouth once daily.       atorvastatin (Lipitor) 80 mg tablet Take 1 tablet (80 mg) by mouth once daily. 90 tablet 3    cholecalciferol (Vitamin D-3) 5,000 Units tablet Take 1 tablet (5,000 Units) by mouth once daily.      chrm/vineg/bit-orang peel/gr t (APPLE CIDER VINEGAR PLUS ORAL) Take by mouth once daily.      clopidogrel (Plavix) 75 mg tablet Take 1 tablet (75 mg) by mouth once daily. 90 tablet 3    dapaglifloz propaned-metformin (Xigduo XR) 5-1,000 mg Take 1 tablet by mouth once daily. Take with food. 90 tablet 3    DULoxetine (Cymbalta) 60 mg DR capsule Take 1 capsule (60 mg) by mouth once daily. 30 capsule 11    EPINEPHrine (EpiPen) 0.3 mg/0.3 mL injection syringe 0.3 mL (0.3 mg). Inject into upper leg. Call 911 after use.      fish oil concentrate (Omega-3) 120-180 mg capsule Take 6 capsules (6,000 mg) by mouth once daily.      hydrOXYzine HCL (Atarax) 25 mg tablet Take 1 tablet (25 mg) by mouth.      hydrOXYzine pamoate (Vistaril) 50 mg capsule Take 1 capsule (50 mg) by mouth 1 time.      isosorbide mononitrate ER (Imdur) 30 mg 24 hr tablet Take 2 tablets (60 mg) by mouth once daily. 180 tablet 3    ketamine HCl (ketamine, bulk,) 100 % powder Ketamine 100 mg/mL + lidocaine 40 mg/mL 1 puff 4 times daily as needed, DSP#20 mL x 5 refills 20 g 5    lidocaine HCl, bulk, 100 % powder powder In ketamine nasal spray      losartan (Cozaar) 50 mg tablet Take 1 tablet (50 mg) by mouth once daily. 90 tablet 3    Myrbetriq 50 mg tablet extended release 24 hr 24 hr tablet Take 1 tablet (50 mg) by mouth once daily. 90 tablet 3    nitroglycerin (Nitrostat) 0.4 mg SL tablet Place under the tongue.      NON FORMULARY Alive veggie based vitamins      NON FORMULARY Medical Marijuana      oxyCODONE-acetaminophen (Percocet) 5-325 mg tablet Take 1 tablet by mouth every 4 hours if needed for severe pain (7 - 10). 12 tablet 0    pantoprazole (ProtoNix) 40 mg EC tablet Take 1 tablet (40 mg) by mouth once daily. Do not crush, chew, or split. 90 tablet 3     predniSONE (Deltasone) 20 mg tablet Take 1 tablet (20 mg) by mouth once daily for 14 days. Take 3 tablets a day x 4 days, take 2 tablets a day x 4 days, take 1 tablet a day x 4 days, take 1/2 tablet for 2 days 25 tablet 0    promethazine (Phenergan) 25 mg tablet Take 1 tablet (25 mg) by mouth every 6 hours if needed for nausea. 90 tablet 1    rOPINIRole (Requip) 1 mg tablet TAKE 1 TABLET (1 MG) BY MOUTH ONCE DAILY AT BEDTIME. 90 tablet 1    sertraline (Zoloft) 100 mg tablet Take 1 tablet (100 mg) by mouth once daily.      sertraline (Zoloft) 50 mg tablet Take 3 tablets (150 mg) by mouth once daily.      tamsulosin (Flomax) 0.4 mg 24 hr capsule Take 1 capsule (0.4 mg) by mouth once daily. 90 capsule 3    Tiadylt  mg 24 hr capsule Take 1 capsule (360 mg) by mouth once daily. 90 capsule 3    tirzepatide (Mounjaro) 7.5 mg/0.5 mL pen injector Inject 7.5 mg under the skin every 7 days. 6 mL 4    tiZANidine (Zanaflex) 4 mg tablet TAKE 1 TABLET (4 MG) BY MOUTH 3 TIMES A DAY AS NEEDED FOR MUSCLE SPASMS. 90 tablet 11    traZODone (Desyrel) 100 mg tablet Take 1 tablet (100 mg) by mouth once daily at bedtime. Take one half or one whole tablet about 20 min before bedtime 90 tablet 3    trospium (Sanctura XR) 60 mg 24 hour capsule Take 1 capsule (60 mg) by mouth once daily. 90 capsule 3    VITAMIN B COMPLEX ORAL Take 1 tablet by mouth 1 (one) time each day.      cetirizine (ZyrTEC) 10 mg tablet Take 1 tablet (10 mg) by mouth once daily. 30 tablet 0    fluticasone (Flonase) 50 mcg/actuation nasal spray Administer 1 spray into each nostril once daily. Shake gently. Before first use, prime pump. After use, clean tip and replace cap. 16 g 0    pregabalin (Lyrica) 50 mg capsule Take 1 capsule (50 mg) by mouth 3 times a day. Do not fill before October 18, 2024. 90 capsule 2     No current facility-administered medications on file prior to visit.        Allergies   Allergen Reactions    Cat Dander Shortness of breath    Onion  Anaphylaxis    Hydromorphone Rash and Other     Agent: Dilaudid        Review of Systems  A detailed 12 point ROS was performed and is negative except as noted in the intake form, HPI and/or Past Medical History        Physical Exam   CONSTITUTIONAL: Well developed, well nourished.  VOICE: Normal voice quality  RESPIRATION: Breathing comfortably, no stridor.  CV: No clubbing/cyanosis/edema in hands.  EYES: EOM Intact, sclera normal.  NEURO: Alert and oriented times 3, Cranial nerves V,VII intact and symmetric bilaterally.  HEAD AND FACE: Symmetric facial features, no masses or lesions, sinuses nontender to palpation.  SALIVARY GLANDS: Parotid and submandibular glands normal bilaterally.  EARS: Normal external ears, external auditory canals, and TMs to otoscopy  NOSE: External nose midline, anterior rhinoscopy is normal with limited visualization to the anterior aspect of the interior turbinates. No lesions noted.  ORAL CAVITY/OROPHARYNX/LIPS: Normal mucous membranes, normal floor of mouth/tongue/OP, no masses or lesions are noted.  PHARYNGEAL WALLS AND NASOPHARYNX: No masses noted. Mucosa appears clean and moist  NECK/LYMPH: No LAD, no thyroid masses. Trachea palpably midline  SKIN: Neck skin is without injury  PSYCH: Alert and oriented with appropriate mood and affect     Procedure:   Right transtympanic dexamethasone injection  Diagnosis/indication: Right sudden hearing loss  The procedure was reviewed and explained, multiple treatment alternatives, risks and benefits discussed.  Consent was obtained.  With the use of the microscope and speculum the right ear was examined, a transtympanic dexamethasone injection (10mg/ml) was performed with a 25-gauge spinal needle.  The patient tolerated the procedure well.     Results:   Audiogram 1/14/2025 personally reviewed  Right: Severe /profound sensorineural hearing loss.  Speech discrimination score 90%.  As tympanogram  Left: Normal hearing up to 2000 Hz sloping to  moderate sensorineural hearing loss.  Speech discrimination score 100%.  As tympanogram. .        Assessment  Right sudden hearing loss  Asymmetric sensorineural hearing loss    Plan  59-year-old male developing right sudden hearing loss on [place] Day.  Currently completing a steroid course, reports no significant improvement of his auditory deficiency.  Multiple treatment alternatives, risks and benefits discussed, he elected to proceed with a right transtympanic dexamethasone injection which was performed today.  MRI IAC pending  RTC 6w

## 2025-02-04 ENCOUNTER — APPOINTMENT (OUTPATIENT)
Dept: RADIOLOGY | Facility: CLINIC | Age: 60
End: 2025-02-04
Payer: COMMERCIAL

## 2025-02-12 DIAGNOSIS — E78.2 MIXED HYPERLIPIDEMIA: ICD-10-CM

## 2025-02-12 RX ORDER — CLOPIDOGREL BISULFATE 75 MG/1
75 TABLET ORAL DAILY
Qty: 90 TABLET | Refills: 3 | Status: SHIPPED | OUTPATIENT
Start: 2025-02-12

## 2025-02-16 PROCEDURE — RXMED WILLOW AMBULATORY MEDICATION CHARGE

## 2025-02-17 ENCOUNTER — PHARMACY VISIT (OUTPATIENT)
Dept: PHARMACY | Facility: CLINIC | Age: 60
End: 2025-02-17
Payer: COMMERCIAL

## 2025-02-17 DIAGNOSIS — R11.0 NAUSEA: ICD-10-CM

## 2025-02-18 ENCOUNTER — APPOINTMENT (OUTPATIENT)
Dept: RADIOLOGY | Facility: CLINIC | Age: 60
End: 2025-02-18
Payer: COMMERCIAL

## 2025-02-18 RX ORDER — PROMETHAZINE HYDROCHLORIDE 25 MG/1
25 TABLET ORAL EVERY 6 HOURS PRN
Qty: 90 TABLET | Refills: 1 | Status: SHIPPED | OUTPATIENT
Start: 2025-02-18

## 2025-02-25 ENCOUNTER — HOSPITAL ENCOUNTER (OUTPATIENT)
Dept: RADIOLOGY | Facility: HOSPITAL | Age: 60
Discharge: HOME | End: 2025-02-25
Payer: MEDICARE

## 2025-02-25 DIAGNOSIS — H90.3 ASYMMETRICAL SENSORINEURAL HEARING LOSS: ICD-10-CM

## 2025-02-25 DIAGNOSIS — M54.42 CHRONIC BILATERAL LOW BACK PAIN WITH LEFT-SIDED SCIATICA: ICD-10-CM

## 2025-02-25 DIAGNOSIS — G89.29 CHRONIC BILATERAL LOW BACK PAIN WITH LEFT-SIDED SCIATICA: ICD-10-CM

## 2025-02-25 DIAGNOSIS — Z96.89 SPINAL CORD STIMULATOR STATUS: ICD-10-CM

## 2025-02-25 DIAGNOSIS — G89.0 CENTRAL PAIN SYNDROME: Primary | ICD-10-CM

## 2025-02-25 PROCEDURE — 2550000001 HC RX 255 CONTRASTS: Performed by: PHYSICIAN ASSISTANT

## 2025-02-25 PROCEDURE — 70553 MRI BRAIN STEM W/O & W/DYE: CPT

## 2025-02-25 PROCEDURE — A9575 INJ GADOTERATE MEGLUMI 0.1ML: HCPCS | Performed by: PHYSICIAN ASSISTANT

## 2025-02-25 PROCEDURE — 70553 MRI BRAIN STEM W/O & W/DYE: CPT | Performed by: RADIOLOGY

## 2025-02-25 RX ORDER — GADOTERATE MEGLUMINE 376.9 MG/ML
17 INJECTION INTRAVENOUS
Status: COMPLETED | OUTPATIENT
Start: 2025-02-25 | End: 2025-02-25

## 2025-02-25 RX ORDER — KETAMINE HCL IN NACL, ISO-OSM 100MG/10ML
SYRINGE (ML) INJECTION ONCE
Status: CANCELLED | OUTPATIENT
Start: 2025-02-28

## 2025-02-25 RX ORDER — KETOROLAC TROMETHAMINE 30 MG/ML
30 INJECTION, SOLUTION INTRAMUSCULAR; INTRAVENOUS ONCE
Status: CANCELLED | OUTPATIENT
Start: 2025-02-28 | End: 2025-02-28

## 2025-02-25 RX ADMIN — GADOTERATE MEGLUMINE 17 ML: 376.9 INJECTION INTRAVENOUS at 10:03

## 2025-02-26 DIAGNOSIS — G47.00 INSOMNIA, UNSPECIFIED TYPE: ICD-10-CM

## 2025-02-27 DIAGNOSIS — G47.00 INSOMNIA, UNSPECIFIED TYPE: ICD-10-CM

## 2025-02-27 RX ORDER — TRAZODONE HYDROCHLORIDE 100 MG/1
TABLET ORAL
Qty: 90 TABLET | Refills: 3 | Status: SHIPPED | OUTPATIENT
Start: 2025-02-27 | End: 2025-02-28

## 2025-02-28 ENCOUNTER — INFUSION (OUTPATIENT)
Dept: INFUSION THERAPY | Facility: CLINIC | Age: 60
End: 2025-02-28
Payer: MEDICARE

## 2025-02-28 VITALS
OXYGEN SATURATION: 94 % | HEART RATE: 74 BPM | DIASTOLIC BLOOD PRESSURE: 59 MMHG | TEMPERATURE: 97.2 F | SYSTOLIC BLOOD PRESSURE: 104 MMHG | RESPIRATION RATE: 17 BRPM

## 2025-02-28 DIAGNOSIS — Z96.89 SPINAL CORD STIMULATOR STATUS: ICD-10-CM

## 2025-02-28 DIAGNOSIS — M54.42 CHRONIC BILATERAL LOW BACK PAIN WITH LEFT-SIDED SCIATICA: ICD-10-CM

## 2025-02-28 DIAGNOSIS — G89.0 CENTRAL PAIN SYNDROME: ICD-10-CM

## 2025-02-28 DIAGNOSIS — G89.29 CHRONIC BILATERAL LOW BACK PAIN WITH LEFT-SIDED SCIATICA: ICD-10-CM

## 2025-02-28 PROCEDURE — 2500000004 HC RX 250 GENERAL PHARMACY W/ HCPCS (ALT 636 FOR OP/ED): Performed by: ANESTHESIOLOGY

## 2025-02-28 PROCEDURE — 96368 THER/DIAG CONCURRENT INF: CPT | Mod: INF

## 2025-02-28 PROCEDURE — 96375 TX/PRO/DX INJ NEW DRUG ADDON: CPT | Mod: INF

## 2025-02-28 PROCEDURE — 96365 THER/PROPH/DIAG IV INF INIT: CPT | Mod: INF

## 2025-02-28 RX ORDER — ALBUTEROL SULFATE 0.83 MG/ML
3 SOLUTION RESPIRATORY (INHALATION) AS NEEDED
OUTPATIENT
Start: 2025-03-30

## 2025-02-28 RX ORDER — METOPROLOL TARTRATE 25 MG/1
25 TABLET, FILM COATED ORAL ONCE
OUTPATIENT
Start: 2025-03-30 | End: 2025-03-30

## 2025-02-28 RX ORDER — FAMOTIDINE 10 MG/ML
20 INJECTION, SOLUTION INTRAVENOUS ONCE AS NEEDED
OUTPATIENT
Start: 2025-03-30

## 2025-02-28 RX ORDER — KETAMINE HCL IN NACL, ISO-OSM 100MG/10ML
SYRINGE (ML) INJECTION ONCE
Status: COMPLETED | OUTPATIENT
Start: 2025-02-28 | End: 2025-02-28

## 2025-02-28 RX ORDER — ONDANSETRON HYDROCHLORIDE 2 MG/ML
4 INJECTION, SOLUTION INTRAVENOUS ONCE
OUTPATIENT
Start: 2025-03-30 | End: 2025-03-30

## 2025-02-28 RX ORDER — KETOROLAC TROMETHAMINE 30 MG/ML
30 INJECTION, SOLUTION INTRAMUSCULAR; INTRAVENOUS ONCE
Status: COMPLETED | OUTPATIENT
Start: 2025-02-28 | End: 2025-02-28

## 2025-02-28 RX ORDER — DIPHENHYDRAMINE HYDROCHLORIDE 50 MG/ML
50 INJECTION INTRAMUSCULAR; INTRAVENOUS AS NEEDED
OUTPATIENT
Start: 2025-03-30

## 2025-02-28 RX ORDER — NITROGLYCERIN 0.4 MG/1
0.4 TABLET SUBLINGUAL ONCE
OUTPATIENT
Start: 2025-03-30 | End: 2025-03-30

## 2025-02-28 RX ORDER — KETAMINE HCL IN NACL, ISO-OSM 100MG/10ML
SYRINGE (ML) INJECTION ONCE
OUTPATIENT
Start: 2025-03-30

## 2025-02-28 RX ORDER — KETOROLAC TROMETHAMINE 30 MG/ML
30 INJECTION, SOLUTION INTRAMUSCULAR; INTRAVENOUS ONCE
OUTPATIENT
Start: 2025-03-30 | End: 2025-03-30

## 2025-02-28 RX ORDER — EPINEPHRINE 0.3 MG/.3ML
0.3 INJECTION SUBCUTANEOUS EVERY 5 MIN PRN
OUTPATIENT
Start: 2025-03-30

## 2025-02-28 RX ORDER — DIPHENHYDRAMINE HYDROCHLORIDE 50 MG/ML
25 INJECTION INTRAMUSCULAR; INTRAVENOUS ONCE
OUTPATIENT
Start: 2025-03-30 | End: 2025-03-30

## 2025-02-28 RX ORDER — TRAZODONE HYDROCHLORIDE 100 MG/1
100 TABLET ORAL NIGHTLY
Qty: 90 TABLET | Refills: 3 | Status: SHIPPED | OUTPATIENT
Start: 2025-02-28

## 2025-02-28 RX ORDER — METOCLOPRAMIDE HYDROCHLORIDE 5 MG/ML
10 INJECTION INTRAMUSCULAR; INTRAVENOUS ONCE
OUTPATIENT
Start: 2025-03-30 | End: 2025-03-30

## 2025-02-28 RX ADMIN — KETOROLAC TROMETHAMINE 30 MG: 30 INJECTION, SOLUTION INTRAMUSCULAR at 09:53

## 2025-02-28 RX ADMIN — PROPOFOL 100 MG: 10 INJECTION, EMULSION INTRAVENOUS at 09:52

## 2025-02-28 RX ADMIN — Medication: at 09:53

## 2025-02-28 ASSESSMENT — ENCOUNTER SYMPTOMS
OCCASIONAL FEELINGS OF UNSTEADINESS: 0
LOSS OF SENSATION IN FEET: 0
DEPRESSION: 0

## 2025-02-28 ASSESSMENT — PAIN - FUNCTIONAL ASSESSMENT: PAIN_FUNCTIONAL_ASSESSMENT: 0-10

## 2025-02-28 ASSESSMENT — PAIN SCALES - GENERAL: PAINLEVEL_OUTOF10: 7

## 2025-02-28 ASSESSMENT — PAIN DESCRIPTION - DESCRIPTORS: DESCRIPTORS: ACHING

## 2025-02-28 NOTE — PATIENT INSTRUCTIONS
Today :We administered ketamine 30 mg-lidocaine 300 mg, propofol (Diprivan), and ketorolac.     For:   1. Central pain syndrome    2. Chronic bilateral low back pain with left-sided sciatica    3. Spinal cord stimulator status         Your next appointment is due in:  See AVS        Please read the  Medication Guide that was given to you and reviewed during todays visit.     (Tell all doctors including dentists that you are taking this medication)     Go to the emergency room or call 911 if:  -You have signs of allergic reaction:   -Rash, hives, itching.   -Swollen, blistered, peeling skin.   -Swelling of face, lips, mouth, tongue or throat.   -Tightness of chest, trouble breathing, swallowing or talking     Call your doctor:  - If IV / injection site gets red, warm, swollen, itchy or leaks fluid or pus.     (Leave dressing on your IV site for at least 2 hours and keep area clean and dry  - If you get sick or have symptoms of infection or are not feeling well for any reason.    (Wash your hands often, stay away from people who are sick)  - If you have side effects from your medication that do not go away or are bothersome.     (Refer to the teaching your nurse gave you for side effects to call your doctor about)    - Common side effects may include:  stuffy nose, headache, feeling tired, muscle aches, upset stomach  - Before receiving any vaccines     - Call the [facility] at   If:  - You get sick, are on antibiotics, have had a recent vaccine, have surgery or dental work and your doctor wants your visit rescheduled.  - You need to cancel and reschedule your visit for any reason. Call at least 2 days before your visit if you need to cancel.   - Your insurance changes before your next visit.    (We will need to get approval from your new insurance. This can take up to two weeks.)     The [facility] is opened Monday thru Friday. We are closed on weekends and holidays.   Voice mail  will take your call if the center is closed. If you leave a message please allow 24 hours for a call back during weekdays. If you leave a message on a weekend/holiday, we will call you back the next business day.    A pharmacist is available Monday - Friday from 8:30AM to 3:30PM to help answer any questions you may have about your prescriptions(s). Please call pharmacy at:    [facility]: [phone]  [facility]: [phone]  [facility]: [phone]              [facility]      Pain Infusion Aftercare Instructions      1. It is normal to feel sedated, tired and low in energy after a pain infusion. DO NOT DRIVE, OPERATE ANY MACHINERY, OR MAKE ANY IMPORTANT DECISIONS FOR AT LEAST 24 HOURS AFTER THE INFUSION.     2. Call the pain center at [phone] with any problems, questions, or concerns.     3. Eat light after the infusion. If you feel queasy or sick to your stomach, laying down with your eyes closed may help. When you resume eating start with something mild like clear liquids, yogurt, applesauce, crackers, etc… Gradually advance to a regular diet.     4. Do not leave your house alone the evening of your pain infusion.     5. No alcohol or sedative medications, such as sleeping pills, for 24 hours after your pain infusion.     6. Resume all other prescribed medications unless directed otherwise by you physician.     7. If you have any medical emergencies, call 911 or go directly to the closest emergency room.

## 2025-02-28 NOTE — PROGRESS NOTES
S: Patient here for 21 st opioid sparing pain infusion. Patient reports 75% reduction in pain after last infusion that lasted 17 days.    Purpose of pain infusion meds explained along with potential side effects.  Patient verbalized understanding.    B: Pain Issues. Is Patient breast feeding: N/A    A: Patient currently has pain described on flow sheet documentation. Designated  is Paulo  [phone]. Patient last ate solid food >8 hours ago, and had liquid >1 hours ago.    R: Plan; Obtain IV access, do patient risk assessment, and start opioid sparing infusion as ordered. Monitoring for S/S of adverse reactions.    1010: Patient is restful as of this time, tolerating pain infusion well. Continuing to monitor.    1035: Post infusion teaching provided. Patient verbalized understanding. VSS, Patient states pain is less rated 2/10. Will assist patient to waiting car via wheelchair.

## 2025-03-01 DIAGNOSIS — G25.81 RLS (RESTLESS LEGS SYNDROME): ICD-10-CM

## 2025-03-03 RX ORDER — ROPINIROLE 1 MG/1
1 TABLET, FILM COATED ORAL NIGHTLY
Qty: 90 TABLET | Refills: 3 | Status: SHIPPED | OUTPATIENT
Start: 2025-03-03

## 2025-03-07 DIAGNOSIS — G89.4 CHRONIC PAIN SYNDROME: ICD-10-CM

## 2025-03-11 RX ORDER — PREGABALIN 50 MG/1
50 CAPSULE ORAL 3 TIMES DAILY
Qty: 90 CAPSULE | Refills: 0 | Status: SHIPPED | OUTPATIENT
Start: 2025-03-11 | End: 2025-04-10

## 2025-03-12 DIAGNOSIS — G89.4 CHRONIC PAIN SYNDROME: ICD-10-CM

## 2025-03-12 PROBLEM — Z00.00 ROUTINE ADULT HEALTH MAINTENANCE: Status: ACTIVE | Noted: 2025-03-12

## 2025-03-12 RX ORDER — PREGABALIN 50 MG/1
CAPSULE ORAL
Qty: 90 CAPSULE | Refills: 0 | OUTPATIENT
Start: 2025-03-12

## 2025-03-12 NOTE — PROGRESS NOTES
Subjective   Patient ID: Logan Campos is a 59 y.o. male who presents for annual physical .    HPI     He is doing well today.  He had his blood tests done this morning.    Had a sinus infection with pressure in his head which led to him getting an MRI for hearing loss.  MRI- Unremarkable for other causes of hearing loss. He's been having tinnitus    Still getting infusions for pain.     Seeing urology for urinary incontinence issues.  Taking Flomax.     Lost about 50 lbs since starting on Mounjaro.   Feels well on it still.    No more abdominal pain, felt was due to diverticulitis , said he will avoid eating food with seeds.    Review of Systems   Respiratory:  Negative for shortness of breath.    Cardiovascular:  Positive for chest pain.   Gastrointestinal:  Negative for constipation and diarrhea.   Genitourinary:  Positive for urgency.         Current Outpatient Medications:     aspirin 325 mg tablet, Take 1 tablet (325 mg) by mouth once daily., Disp: , Rfl:     atorvastatin (Lipitor) 80 mg tablet, Take 1 tablet (80 mg) by mouth once daily., Disp: 90 tablet, Rfl: 3    cetirizine (ZyrTEC) 10 mg tablet, Take 1 tablet (10 mg) by mouth once daily., Disp: 30 tablet, Rfl: 0    cholecalciferol (Vitamin D-3) 5,000 Units tablet, Take 1 tablet (5,000 Units) by mouth once daily., Disp: , Rfl:     chrm/vineg/bit-orang peel/gr t (APPLE CIDER VINEGAR PLUS ORAL), Take by mouth once daily., Disp: , Rfl:     clopidogrel (Plavix) 75 mg tablet, Take 1 tablet (75 mg) by mouth once daily., Disp: 90 tablet, Rfl: 3    dapaglifloz propaned-metformin (Xigduo XR) 5-1,000 mg, Take 1 tablet by mouth once daily. Take with food., Disp: 90 tablet, Rfl: 3    DULoxetine (Cymbalta) 60 mg DR capsule, Take 1 capsule (60 mg) by mouth once daily., Disp: 30 capsule, Rfl: 11    EPINEPHrine (EpiPen) 0.3 mg/0.3 mL injection syringe, 0.3 mL (0.3 mg). Inject into upper leg. Call 911 after use., Disp: , Rfl:     fish oil concentrate (Omega-3) 120-180 mg  capsule, Take 6 capsules (6,000 mg) by mouth once daily., Disp: , Rfl:     fluticasone (Flonase) 50 mcg/actuation nasal spray, Administer 1 spray into each nostril once daily. Shake gently. Before first use, prime pump. After use, clean tip and replace cap., Disp: 16 g, Rfl: 0    hydrOXYzine HCL (Atarax) 25 mg tablet, Take 1 tablet (25 mg) by mouth., Disp: , Rfl:     hydrOXYzine pamoate (Vistaril) 50 mg capsule, Take 1 capsule (50 mg) by mouth 1 time., Disp: , Rfl:     isosorbide mononitrate ER (Imdur) 30 mg 24 hr tablet, Take 2 tablets (60 mg) by mouth once daily., Disp: 180 tablet, Rfl: 3    ketamine HCl (ketamine, bulk,) 100 % powder, Ketamine 100 mg/mL + lidocaine 40 mg/mL 1 puff 4 times daily as needed, DSP#20 mL x 5 refills, Disp: 20 g, Rfl: 5    lidocaine HCl, bulk, 100 % powder powder, In ketamine nasal spray, Disp: , Rfl:     losartan (Cozaar) 50 mg tablet, Take 1 tablet (50 mg) by mouth once daily., Disp: 90 tablet, Rfl: 3    Myrbetriq 50 mg tablet extended release 24 hr 24 hr tablet, Take 1 tablet (50 mg) by mouth once daily. (Patient not taking: Reported on 2/28/2025), Disp: 90 tablet, Rfl: 3    nitroglycerin (Nitrostat) 0.4 mg SL tablet, Place under the tongue., Disp: , Rfl:     NON FORMULARY, Alive veggie based vitamins, Disp: , Rfl:     NON FORMULARY, Medical Marijuana, Disp: , Rfl:     oxyCODONE-acetaminophen (Percocet) 5-325 mg tablet, Take 1 tablet by mouth every 4 hours if needed for severe pain (7 - 10)., Disp: 12 tablet, Rfl: 0    pantoprazole (ProtoNix) 40 mg EC tablet, Take 1 tablet (40 mg) by mouth once daily. Do not crush, chew, or split., Disp: 90 tablet, Rfl: 3    pregabalin (Lyrica) 50 mg capsule, Take 1 capsule (50 mg) by mouth 3 times a day., Disp: 90 capsule, Rfl: 0    promethazine (Phenergan) 25 mg tablet, Take 1 tablet (25 mg) by mouth every 6 hours if needed for nausea., Disp: 90 tablet, Rfl: 1    rOPINIRole (Requip) 1 mg tablet, TAKE 1 TABLET (1 MG) BY MOUTH ONCE DAILY AT BEDTIME.,  "Disp: 90 tablet, Rfl: 3    sertraline (Zoloft) 100 mg tablet, Take 1 tablet (100 mg) by mouth once daily., Disp: , Rfl:     sertraline (Zoloft) 50 mg tablet, Take 3 tablets (150 mg) by mouth once daily., Disp: , Rfl:     tamsulosin (Flomax) 0.4 mg 24 hr capsule, Take 1 capsule (0.4 mg) by mouth once daily., Disp: 90 capsule, Rfl: 3    Tiadylt  mg 24 hr capsule, Take 1 capsule (360 mg) by mouth once daily., Disp: 90 capsule, Rfl: 3    tirzepatide (Mounjaro) 7.5 mg/0.5 mL pen injector, Inject 7.5 mg under the skin every 7 days., Disp: 6 mL, Rfl: 4    tiZANidine (Zanaflex) 4 mg tablet, TAKE 1 TABLET (4 MG) BY MOUTH 3 TIMES A DAY AS NEEDED FOR MUSCLE SPASMS., Disp: 90 tablet, Rfl: 11    traZODone (Desyrel) 100 mg tablet, Take 1 tablet (100 mg) by mouth once daily at bedtime., Disp: 90 tablet, Rfl: 3    trospium (Sanctura XR) 60 mg 24 hour capsule, Take 1 capsule (60 mg) by mouth once daily., Disp: 90 capsule, Rfl: 3    VITAMIN B COMPLEX ORAL, Take 1 tablet by mouth 1 (one) time each day., Disp: , Rfl:     Objective   /70   Pulse 80   Resp 16   Ht 1.727 m (5' 8\")   Wt 90 kg (198 lb 6.4 oz)   BMI 30.17 kg/m²     Physical Exam  Constitutional:       Appearance: Normal appearance.   HENT:      Right Ear: Tympanic membrane, ear canal and external ear normal. There is no impacted cerumen.      Left Ear: Tympanic membrane, ear canal and external ear normal. There is no impacted cerumen.      Mouth/Throat:      Mouth: Mucous membranes are moist.      Pharynx: Oropharynx is clear.   Eyes:      Conjunctiva/sclera: Conjunctivae normal.      Pupils: Pupils are equal, round, and reactive to light.   Cardiovascular:      Rate and Rhythm: Normal rate and regular rhythm.      Heart sounds: Normal heart sounds.   Pulmonary:      Effort: Pulmonary effort is normal.      Breath sounds: Normal breath sounds.   Abdominal:      General: Abdomen is flat. There is no distension.      Palpations: Abdomen is soft.      " Tenderness: There is no abdominal tenderness.   Lymphadenopathy:      Cervical: No cervical adenopathy.   Neurological:      General: No focal deficit present.      Mental Status: He is alert and oriented to person, place, and time.         Assessment/Plan   Problem List Items Addressed This Visit          Cardiac and Vasculature    CAD (coronary artery disease)     Remains on clopidogrel 75 mg daily and aspirin 325 daily.   Also remains on atorvastatin 80 and has as needed nitroglycerin.          Mixed hyperlipidemia     Remain on Atorvastatin 80 mg daily          Essential hypertension     Controlled.  Remain on Losartan 50 mg daily             Endocrine/Metabolic    Type 2 diabetes mellitus     Remains on Mounjaro at 7.5 mg daily and also Xigduo 5/1000 mg daily.   Will check fingerstick A1c at next visit.             Gastrointestinal and Abdominal    Gastroesophageal reflux disease     Remain on Pantoprazole 40 mg daily             Health Encounters    Routine adult health maintenance - Primary     Annual physical completed today.  Will check fingerstick A1c at next visit.             Please return to see me in 3-4 months for a 30 minute follow up.     Scribe Attestation  By signing my name below, ISarah Scribe   attest that this documentation has been prepared under the direction and in the presence of Magda Gilman DO.

## 2025-03-12 NOTE — ASSESSMENT & PLAN NOTE
Remains on clopidogrel 75 mg daily and aspirin 325 daily.   Also remains on atorvastatin 80 and has as needed nitroglycerin.

## 2025-03-12 NOTE — ASSESSMENT & PLAN NOTE
Remains on Mounjaro at 7.5 mg daily and also Xigduo 5/1000 mg daily.   Will check fingerstick A1c at next visit.

## 2025-03-17 ENCOUNTER — APPOINTMENT (OUTPATIENT)
Dept: PRIMARY CARE | Facility: CLINIC | Age: 60
End: 2025-03-17
Payer: COMMERCIAL

## 2025-03-17 VITALS
HEIGHT: 68 IN | DIASTOLIC BLOOD PRESSURE: 70 MMHG | BODY MASS INDEX: 30.07 KG/M2 | HEART RATE: 80 BPM | RESPIRATION RATE: 16 BRPM | WEIGHT: 198.4 LBS | SYSTOLIC BLOOD PRESSURE: 111 MMHG

## 2025-03-17 DIAGNOSIS — K21.9 GASTROESOPHAGEAL REFLUX DISEASE, UNSPECIFIED WHETHER ESOPHAGITIS PRESENT: ICD-10-CM

## 2025-03-17 DIAGNOSIS — I25.118 CORONARY ARTERY DISEASE OF NATIVE ARTERY OF NATIVE HEART WITH STABLE ANGINA PECTORIS: ICD-10-CM

## 2025-03-17 DIAGNOSIS — E11.649 TYPE 2 DIABETES MELLITUS WITH HYPOGLYCEMIA WITHOUT COMA, WITHOUT LONG-TERM CURRENT USE OF INSULIN: ICD-10-CM

## 2025-03-17 DIAGNOSIS — I10 ESSENTIAL HYPERTENSION: ICD-10-CM

## 2025-03-17 DIAGNOSIS — E78.2 MIXED HYPERLIPIDEMIA: ICD-10-CM

## 2025-03-17 DIAGNOSIS — Z00.00 ROUTINE ADULT HEALTH MAINTENANCE: Primary | ICD-10-CM

## 2025-03-17 PROCEDURE — 4010F ACE/ARB THERAPY RXD/TAKEN: CPT | Performed by: INTERNAL MEDICINE

## 2025-03-17 PROCEDURE — 3008F BODY MASS INDEX DOCD: CPT | Performed by: INTERNAL MEDICINE

## 2025-03-17 PROCEDURE — 3078F DIAST BP <80 MM HG: CPT | Performed by: INTERNAL MEDICINE

## 2025-03-17 PROCEDURE — 3074F SYST BP LT 130 MM HG: CPT | Performed by: INTERNAL MEDICINE

## 2025-03-17 PROCEDURE — 1036F TOBACCO NON-USER: CPT | Performed by: INTERNAL MEDICINE

## 2025-03-17 PROCEDURE — 99396 PREV VISIT EST AGE 40-64: CPT | Performed by: INTERNAL MEDICINE

## 2025-03-17 ASSESSMENT — ENCOUNTER SYMPTOMS
DIARRHEA: 0
CONSTIPATION: 0
SHORTNESS OF BREATH: 0

## 2025-03-17 ASSESSMENT — PAIN SCALES - GENERAL: PAINLEVEL_OUTOF10: 0-NO PAIN

## 2025-03-17 NOTE — PATIENT INSTRUCTIONS
See me again in 3 to 4 months for 30 min follow up.    We can do fingerstick A1c at that visit ( do not need to fast)

## 2025-03-18 LAB
ALBUMIN SERPL-MCNC: 4.5 G/DL (ref 3.6–5.1)
ALBUMIN/CREAT UR: 11 MG/G CREAT
ALP SERPL-CCNC: 77 U/L (ref 35–144)
ALT SERPL-CCNC: 18 U/L (ref 9–46)
ANION GAP SERPL CALCULATED.4IONS-SCNC: 10 MMOL/L (CALC) (ref 7–17)
AST SERPL-CCNC: 24 U/L (ref 10–35)
BILIRUB SERPL-MCNC: 0.5 MG/DL (ref 0.2–1.2)
BUN SERPL-MCNC: 14 MG/DL (ref 7–25)
CALCIUM SERPL-MCNC: 9.2 MG/DL (ref 8.6–10.3)
CHLORIDE SERPL-SCNC: 105 MMOL/L (ref 98–110)
CHOLEST SERPL-MCNC: 124 MG/DL
CHOLEST/HDLC SERPL: 2 (CALC)
CO2 SERPL-SCNC: 29 MMOL/L (ref 20–32)
CREAT SERPL-MCNC: 1.15 MG/DL (ref 0.7–1.3)
CREAT UR-MCNC: 44 MG/DL (ref 20–320)
EGFRCR SERPLBLD CKD-EPI 2021: 74 ML/MIN/1.73M2
GLUCOSE SERPL-MCNC: 117 MG/DL (ref 65–99)
HDLC SERPL-MCNC: 61 MG/DL
LDLC SERPL CALC-MCNC: 47 MG/DL (CALC)
MICROALBUMIN UR-MCNC: 0.5 MG/DL
NONHDLC SERPL-MCNC: 63 MG/DL (CALC)
POTASSIUM SERPL-SCNC: 4 MMOL/L (ref 3.5–5.3)
PROT SERPL-MCNC: 6.6 G/DL (ref 6.1–8.1)
SODIUM SERPL-SCNC: 144 MMOL/L (ref 135–146)
TRIGL SERPL-MCNC: 77 MG/DL
TSH SERPL-ACNC: 2.02 MIU/L (ref 0.4–4.5)

## 2025-03-31 DIAGNOSIS — G89.0 CENTRAL PAIN SYNDROME: Primary | ICD-10-CM

## 2025-03-31 DIAGNOSIS — G89.29 CHRONIC BILATERAL LOW BACK PAIN WITH LEFT-SIDED SCIATICA: ICD-10-CM

## 2025-03-31 DIAGNOSIS — M54.42 CHRONIC BILATERAL LOW BACK PAIN WITH LEFT-SIDED SCIATICA: ICD-10-CM

## 2025-03-31 DIAGNOSIS — Z96.89 SPINAL CORD STIMULATOR STATUS: ICD-10-CM

## 2025-03-31 RX ORDER — KETAMINE HCL IN NACL, ISO-OSM 100MG/10ML
SYRINGE (ML) INJECTION ONCE
Status: CANCELLED | OUTPATIENT
Start: 2025-04-04

## 2025-03-31 RX ORDER — KETOROLAC TROMETHAMINE 30 MG/ML
30 INJECTION, SOLUTION INTRAMUSCULAR; INTRAVENOUS ONCE
Status: CANCELLED | OUTPATIENT
Start: 2025-04-04 | End: 2025-04-04

## 2025-04-04 ENCOUNTER — INFUSION (OUTPATIENT)
Dept: INFUSION THERAPY | Facility: CLINIC | Age: 60
End: 2025-04-04
Payer: COMMERCIAL

## 2025-04-04 VITALS
DIASTOLIC BLOOD PRESSURE: 63 MMHG | TEMPERATURE: 97 F | RESPIRATION RATE: 17 BRPM | SYSTOLIC BLOOD PRESSURE: 109 MMHG | HEART RATE: 97 BPM | OXYGEN SATURATION: 93 %

## 2025-04-04 DIAGNOSIS — Z96.89 SPINAL CORD STIMULATOR STATUS: ICD-10-CM

## 2025-04-04 DIAGNOSIS — G89.29 CHRONIC BILATERAL LOW BACK PAIN WITH LEFT-SIDED SCIATICA: ICD-10-CM

## 2025-04-04 DIAGNOSIS — G89.0 CENTRAL PAIN SYNDROME: ICD-10-CM

## 2025-04-04 DIAGNOSIS — M54.42 CHRONIC BILATERAL LOW BACK PAIN WITH LEFT-SIDED SCIATICA: ICD-10-CM

## 2025-04-04 PROCEDURE — 96368 THER/DIAG CONCURRENT INF: CPT | Mod: INF

## 2025-04-04 PROCEDURE — 2500000004 HC RX 250 GENERAL PHARMACY W/ HCPCS (ALT 636 FOR OP/ED): Performed by: NURSE PRACTITIONER

## 2025-04-04 PROCEDURE — 96365 THER/PROPH/DIAG IV INF INIT: CPT | Mod: INF

## 2025-04-04 PROCEDURE — 96375 TX/PRO/DX INJ NEW DRUG ADDON: CPT | Mod: INF

## 2025-04-04 RX ORDER — METOPROLOL TARTRATE 25 MG/1
25 TABLET, FILM COATED ORAL ONCE
OUTPATIENT
Start: 2025-05-04 | End: 2025-05-04

## 2025-04-04 RX ORDER — DIPHENHYDRAMINE HYDROCHLORIDE 50 MG/ML
25 INJECTION, SOLUTION INTRAMUSCULAR; INTRAVENOUS ONCE
OUTPATIENT
Start: 2025-05-04 | End: 2025-05-04

## 2025-04-04 RX ORDER — KETAMINE HCL IN NACL, ISO-OSM 100MG/10ML
SYRINGE (ML) INJECTION ONCE
Status: COMPLETED | OUTPATIENT
Start: 2025-04-04 | End: 2025-04-04

## 2025-04-04 RX ORDER — KETOROLAC TROMETHAMINE 30 MG/ML
30 INJECTION, SOLUTION INTRAMUSCULAR; INTRAVENOUS ONCE
Status: COMPLETED | OUTPATIENT
Start: 2025-04-04 | End: 2025-04-04

## 2025-04-04 RX ORDER — KETAMINE HCL IN NACL, ISO-OSM 100MG/10ML
SYRINGE (ML) INJECTION ONCE
Status: CANCELLED | OUTPATIENT
Start: 2025-05-04

## 2025-04-04 RX ORDER — NITROGLYCERIN 0.4 MG/1
0.4 TABLET SUBLINGUAL ONCE
OUTPATIENT
Start: 2025-05-04 | End: 2025-05-04

## 2025-04-04 RX ORDER — METOCLOPRAMIDE HYDROCHLORIDE 5 MG/ML
10 INJECTION INTRAMUSCULAR; INTRAVENOUS ONCE
OUTPATIENT
Start: 2025-05-04 | End: 2025-05-04

## 2025-04-04 RX ORDER — FAMOTIDINE 10 MG/ML
20 INJECTION, SOLUTION INTRAVENOUS ONCE AS NEEDED
OUTPATIENT
Start: 2025-05-04

## 2025-04-04 RX ORDER — ALBUTEROL SULFATE 0.83 MG/ML
3 SOLUTION RESPIRATORY (INHALATION) AS NEEDED
OUTPATIENT
Start: 2025-05-04

## 2025-04-04 RX ORDER — ONDANSETRON HYDROCHLORIDE 2 MG/ML
4 INJECTION, SOLUTION INTRAVENOUS ONCE
OUTPATIENT
Start: 2025-05-04 | End: 2025-05-04

## 2025-04-04 RX ORDER — EPINEPHRINE 0.3 MG/.3ML
0.3 INJECTION SUBCUTANEOUS EVERY 5 MIN PRN
OUTPATIENT
Start: 2025-05-04

## 2025-04-04 RX ORDER — KETOROLAC TROMETHAMINE 30 MG/ML
30 INJECTION, SOLUTION INTRAMUSCULAR; INTRAVENOUS ONCE
OUTPATIENT
Start: 2025-05-04 | End: 2025-05-04

## 2025-04-04 RX ORDER — DIPHENHYDRAMINE HYDROCHLORIDE 50 MG/ML
50 INJECTION, SOLUTION INTRAMUSCULAR; INTRAVENOUS AS NEEDED
OUTPATIENT
Start: 2025-05-04

## 2025-04-04 RX ADMIN — Medication: at 09:43

## 2025-04-04 RX ADMIN — KETOROLAC TROMETHAMINE 30 MG: 30 INJECTION, SOLUTION INTRAMUSCULAR at 09:43

## 2025-04-04 RX ADMIN — PROPOFOL 100 MG: 10 INJECTION, EMULSION INTRAVENOUS at 09:43

## 2025-04-04 ASSESSMENT — ENCOUNTER SYMPTOMS
OCCASIONAL FEELINGS OF UNSTEADINESS: 0
LOSS OF SENSATION IN FEET: 1
DEPRESSION: 0

## 2025-04-04 ASSESSMENT — PAIN SCALES - GENERAL
PAINLEVEL_OUTOF10: 7
PAINLEVEL_OUTOF10: 7
PAINLEVEL_OUTOF10: 2

## 2025-04-04 NOTE — PATIENT INSTRUCTIONS
Today :We administered ketamine 30 mg-lidocaine 300 mg, propofol (Diprivan), and ketorolac.     For:   1. Central pain syndrome    2. Chronic bilateral low back pain with left-sided sciatica    3. Spinal cord stimulator status       (Tell all doctors including dentists that you are taking this medication)     Go to the emergency room or call 911 if:  -You have signs of allergic reaction:   -Rash, hives, itching.   -Swollen, blistered, peeling skin.   -Swelling of face, lips, mouth, tongue or throat.   -Tightness of chest, trouble breathing, swallowing or talking     Call your doctor:  - If IV / injection site gets red, warm, swollen, itchy or leaks fluid or pus.     (Leave dressing on your IV site for at least 2 hours and keep area clean and dry  - If you get sick or have symptoms of infection or are not feeling well for any reason.    (Wash your hands often, stay away from people who are sick)  - If you have side effects from your medication that do not go away or are bothersome.     (Refer to the teaching your nurse gave you for side effects to call your doctor about)    - Common side effects may include:  stuffy nose, headache, feeling tired, muscle aches, upset stomach  - Before receiving any vaccines     - Call [facility] at   If:  - You get sick, are on antibiotics, have had a recent vaccine, have surgery or dental work and your doctor wants your visit rescheduled.  - You need to cancel and reschedule your visit for any reason. Call at least 2 days before your visit if you need to cancel.   - Your insurance changes before your next visit.    (We will need to get approval from your new insurance. This can take up to two weeks.)     The [facility] is opened Monday thru Friday. We are closed on weekends and holidays.   Voice mail will take your call if the center is closed. If you leave a message please allow 24 hours for a call back during weekdays. If you leave a message on a  weekend/holiday, we will call you back the next business day.    A pharmacist is available Monday - Friday from 8:30AM to 3:30PM to help answer any questions you may have about your prescriptions(s). Please call pharmacy at:    [facility]: [phone]  [facility]: [phone]  [facility]: [phone]              [facility]      Pain Infusion Aftercare Instructions      1. It is normal to feel sedated, tired and low in energy after a pain infusion. DO NOT DRIVE, OPERATE ANY MACHINERY, OR MAKE ANY IMPORTANT DECISIONS FOR AT LEAST 24 HOURS AFTER THE INFUSION.     2. Call the pain center at [phone] with any problems, questions, or concerns.     3. Eat light after the infusion. If you feel queasy or sick to your stomach, laying down with your eyes closed may help. When you resume eating start with something mild like clear liquids, yogurt, applesauce, crackers, etc… Gradually advance to a regular diet.     4. Do not leave your house alone the evening of your pain infusion.     5. No alcohol or sedative medications, such as sleeping pills, for 24 hours after your pain infusion.     6. Resume all other prescribed medications unless directed otherwise by you physician.     7. If you have any medical emergencies, call 911 or go directly to the closest emergency room.

## 2025-04-04 NOTE — PROGRESS NOTES
S: Patient here for 22nd opioid sparing pain infusion. Patient reports 75% reduction in pain after last infusion that lasted 18 days.    Purpose of pain infusion meds explained along with potential side effects.  Patient verbalized understanding.    B: Pain Issues. Low back    Is Patient breast feeding: n/a    A: Patient currently has pain described on flow sheet documentation. Designated  is ramakrishna [phone]  . Patient last ate solid food >12 hours ago, and had liquid 1 hours ago.    R: Plan; Obtain IV access, do patient risk assessment, and start opioid sparing infusion as ordered. Monitoring for S/S of adverse reactions.    Post infusion teaching provided. Patient verbalized understanding. VSS, Patient states pain is 2/10. Will assist patient to waiting car via wheelchair.

## 2025-04-08 ENCOUNTER — APPOINTMENT (OUTPATIENT)
Dept: AUDIOLOGY | Facility: CLINIC | Age: 60
End: 2025-04-08
Payer: COMMERCIAL

## 2025-04-08 ENCOUNTER — APPOINTMENT (OUTPATIENT)
Dept: OTOLARYNGOLOGY | Facility: CLINIC | Age: 60
End: 2025-04-08
Payer: COMMERCIAL

## 2025-04-15 ENCOUNTER — TELEPHONE (OUTPATIENT)
Dept: CARDIOLOGY | Facility: CLINIC | Age: 60
End: 2025-04-15
Payer: COMMERCIAL

## 2025-04-15 NOTE — TELEPHONE ENCOUNTER
Patient reports increased incidence of chest pain episodes and chest tightness, increased fatigue. Patient is requesting a cardiac stress test.    LM to discuss.    Patient called back- states CP is different from his daily discomfort that is treated with neurostimulator and Imdur- more a heaviness with noticeable increased fatigue.  Reviewed with MICHAEL Cisneros- have patient go to ER for evaluation.  Spoke with patient and advised to go to ER for eval. Patient agreeable.

## 2025-04-16 ENCOUNTER — HOSPITAL ENCOUNTER (OUTPATIENT)
Facility: HOSPITAL | Age: 60
Setting detail: OBSERVATION
Discharge: HOME | End: 2025-04-17
Attending: STUDENT IN AN ORGANIZED HEALTH CARE EDUCATION/TRAINING PROGRAM | Admitting: INTERNAL MEDICINE
Payer: COMMERCIAL

## 2025-04-16 ENCOUNTER — APPOINTMENT (OUTPATIENT)
Dept: RADIOLOGY | Facility: HOSPITAL | Age: 60
End: 2025-04-16
Payer: COMMERCIAL

## 2025-04-16 ENCOUNTER — APPOINTMENT (OUTPATIENT)
Dept: CARDIOLOGY | Facility: HOSPITAL | Age: 60
End: 2025-04-16
Payer: COMMERCIAL

## 2025-04-16 DIAGNOSIS — R07.9 CHEST PAIN, UNSPECIFIED TYPE: Primary | ICD-10-CM

## 2025-04-16 DIAGNOSIS — I20.0 UNSTABLE ANGINA (MULTI): ICD-10-CM

## 2025-04-16 LAB
ALBUMIN SERPL BCP-MCNC: 4.6 G/DL (ref 3.4–5)
ALP SERPL-CCNC: 68 U/L (ref 33–120)
ALT SERPL W P-5'-P-CCNC: 15 U/L (ref 10–52)
ANION GAP SERPL CALC-SCNC: 12 MMOL/L (ref 10–20)
AST SERPL W P-5'-P-CCNC: 24 U/L (ref 9–39)
BASOPHILS # BLD AUTO: 0.05 X10*3/UL (ref 0–0.1)
BASOPHILS NFR BLD AUTO: 0.6 %
BILIRUB SERPL-MCNC: 0.4 MG/DL (ref 0–1.2)
BNP SERPL-MCNC: 56 PG/ML (ref 0–99)
BUN SERPL-MCNC: 14 MG/DL (ref 6–23)
CALCIUM SERPL-MCNC: 9.1 MG/DL (ref 8.6–10.3)
CARDIAC TROPONIN I PNL SERPL HS: 3 NG/L (ref 0–20)
CARDIAC TROPONIN I PNL SERPL HS: 3 NG/L (ref 0–20)
CARDIAC TROPONIN I PNL SERPL HS: <3 NG/L (ref 0–20)
CHLORIDE SERPL-SCNC: 107 MMOL/L (ref 98–107)
CO2 SERPL-SCNC: 27 MMOL/L (ref 21–32)
CREAT SERPL-MCNC: 1.28 MG/DL (ref 0.5–1.3)
EGFRCR SERPLBLD CKD-EPI 2021: 65 ML/MIN/1.73M*2
EOSINOPHIL # BLD AUTO: 0.12 X10*3/UL (ref 0–0.7)
EOSINOPHIL NFR BLD AUTO: 1.5 %
ERYTHROCYTE [DISTWIDTH] IN BLOOD BY AUTOMATED COUNT: 12.5 % (ref 11.5–14.5)
GLUCOSE BLD MANUAL STRIP-MCNC: 90 MG/DL (ref 74–99)
GLUCOSE SERPL-MCNC: 59 MG/DL (ref 74–99)
HCT VFR BLD AUTO: 42.9 % (ref 41–52)
HGB BLD-MCNC: 13.8 G/DL (ref 13.5–17.5)
IMM GRANULOCYTES # BLD AUTO: 0.03 X10*3/UL (ref 0–0.7)
IMM GRANULOCYTES NFR BLD AUTO: 0.4 % (ref 0–0.9)
LYMPHOCYTES # BLD AUTO: 2.39 X10*3/UL (ref 1.2–4.8)
LYMPHOCYTES NFR BLD AUTO: 29.8 %
MAGNESIUM SERPL-MCNC: 2.41 MG/DL (ref 1.6–2.4)
MCH RBC QN AUTO: 30.3 PG (ref 26–34)
MCHC RBC AUTO-ENTMCNC: 32.2 G/DL (ref 32–36)
MCV RBC AUTO: 94 FL (ref 80–100)
MONOCYTES # BLD AUTO: 0.54 X10*3/UL (ref 0.1–1)
MONOCYTES NFR BLD AUTO: 6.7 %
NEUTROPHILS # BLD AUTO: 4.88 X10*3/UL (ref 1.2–7.7)
NEUTROPHILS NFR BLD AUTO: 61 %
NRBC BLD-RTO: 0 /100 WBCS (ref 0–0)
PLATELET # BLD AUTO: 226 X10*3/UL (ref 150–450)
POTASSIUM SERPL-SCNC: 3.7 MMOL/L (ref 3.5–5.3)
PROT SERPL-MCNC: 6.9 G/DL (ref 6.4–8.2)
RBC # BLD AUTO: 4.56 X10*6/UL (ref 4.5–5.9)
SODIUM SERPL-SCNC: 142 MMOL/L (ref 136–145)
WBC # BLD AUTO: 8 X10*3/UL (ref 4.4–11.3)

## 2025-04-16 PROCEDURE — 36415 COLL VENOUS BLD VENIPUNCTURE: CPT | Performed by: NURSE PRACTITIONER

## 2025-04-16 PROCEDURE — 80053 COMPREHEN METABOLIC PANEL: CPT | Performed by: NURSE PRACTITIONER

## 2025-04-16 PROCEDURE — 2500000001 HC RX 250 WO HCPCS SELF ADMINISTERED DRUGS (ALT 637 FOR MEDICARE OP): Performed by: INTERNAL MEDICINE

## 2025-04-16 PROCEDURE — 84484 ASSAY OF TROPONIN QUANT: CPT | Performed by: INTERNAL MEDICINE

## 2025-04-16 PROCEDURE — G0378 HOSPITAL OBSERVATION PER HR: HCPCS

## 2025-04-16 PROCEDURE — 2500000001 HC RX 250 WO HCPCS SELF ADMINISTERED DRUGS (ALT 637 FOR MEDICARE OP): Performed by: STUDENT IN AN ORGANIZED HEALTH CARE EDUCATION/TRAINING PROGRAM

## 2025-04-16 PROCEDURE — 99223 1ST HOSP IP/OBS HIGH 75: CPT | Performed by: INTERNAL MEDICINE

## 2025-04-16 PROCEDURE — 71046 X-RAY EXAM CHEST 2 VIEWS: CPT | Performed by: RADIOLOGY

## 2025-04-16 PROCEDURE — 93005 ELECTROCARDIOGRAM TRACING: CPT

## 2025-04-16 PROCEDURE — 84484 ASSAY OF TROPONIN QUANT: CPT | Performed by: NURSE PRACTITIONER

## 2025-04-16 PROCEDURE — 99285 EMERGENCY DEPT VISIT HI MDM: CPT | Mod: 25 | Performed by: STUDENT IN AN ORGANIZED HEALTH CARE EDUCATION/TRAINING PROGRAM

## 2025-04-16 PROCEDURE — 2500000002 HC RX 250 W HCPCS SELF ADMINISTERED DRUGS (ALT 637 FOR MEDICARE OP, ALT 636 FOR OP/ED): Performed by: INTERNAL MEDICINE

## 2025-04-16 PROCEDURE — 82947 ASSAY GLUCOSE BLOOD QUANT: CPT

## 2025-04-16 PROCEDURE — 85025 COMPLETE CBC W/AUTO DIFF WBC: CPT | Performed by: NURSE PRACTITIONER

## 2025-04-16 PROCEDURE — 83880 ASSAY OF NATRIURETIC PEPTIDE: CPT | Performed by: NURSE PRACTITIONER

## 2025-04-16 PROCEDURE — 83735 ASSAY OF MAGNESIUM: CPT | Performed by: NURSE PRACTITIONER

## 2025-04-16 PROCEDURE — 71046 X-RAY EXAM CHEST 2 VIEWS: CPT

## 2025-04-16 RX ORDER — OXYCODONE AND ACETAMINOPHEN 5; 325 MG/1; MG/1
1 TABLET ORAL EVERY 4 HOURS PRN
Refills: 0 | Status: DISCONTINUED | OUTPATIENT
Start: 2025-04-16 | End: 2025-04-17 | Stop reason: HOSPADM

## 2025-04-16 RX ORDER — EPINEPHRINE 0.1 MG/ML
0.3 INJECTION INTRACARDIAC; INTRAVENOUS ONCE AS NEEDED
Status: DISCONTINUED | OUTPATIENT
Start: 2025-04-16 | End: 2025-04-17 | Stop reason: HOSPADM

## 2025-04-16 RX ORDER — NAPROXEN SODIUM 220 MG/1
162 TABLET, FILM COATED ORAL ONCE
Status: COMPLETED | OUTPATIENT
Start: 2025-04-16 | End: 2025-04-16

## 2025-04-16 RX ORDER — NAPROXEN SODIUM 220 MG/1
81 TABLET, FILM COATED ORAL DAILY
Status: DISCONTINUED | OUTPATIENT
Start: 2025-04-17 | End: 2025-04-17 | Stop reason: HOSPADM

## 2025-04-16 RX ORDER — OXYBUTYNIN CHLORIDE 5 MG/1
5 TABLET ORAL 4 TIMES DAILY
Status: DISCONTINUED | OUTPATIENT
Start: 2025-04-16 | End: 2025-04-17 | Stop reason: HOSPADM

## 2025-04-16 RX ORDER — TRAZODONE HYDROCHLORIDE 50 MG/1
100 TABLET ORAL NIGHTLY
Status: DISCONTINUED | OUTPATIENT
Start: 2025-04-16 | End: 2025-04-17 | Stop reason: HOSPADM

## 2025-04-16 RX ORDER — NITROGLYCERIN 0.4 MG/1
0.4 TABLET SUBLINGUAL EVERY 5 MIN PRN
Status: DISCONTINUED | OUTPATIENT
Start: 2025-04-16 | End: 2025-04-17 | Stop reason: HOSPADM

## 2025-04-16 RX ORDER — POLYETHYLENE GLYCOL 3350 17 G/17G
17 POWDER, FOR SOLUTION ORAL DAILY PRN
Status: DISCONTINUED | OUTPATIENT
Start: 2025-04-16 | End: 2025-04-17 | Stop reason: HOSPADM

## 2025-04-16 RX ORDER — PREGABALIN 50 MG/1
50 CAPSULE ORAL 3 TIMES DAILY
Status: DISCONTINUED | OUTPATIENT
Start: 2025-04-16 | End: 2025-04-17 | Stop reason: HOSPADM

## 2025-04-16 RX ORDER — INSULIN LISPRO 100 [IU]/ML
0-10 INJECTION, SOLUTION INTRAVENOUS; SUBCUTANEOUS
Status: DISCONTINUED | OUTPATIENT
Start: 2025-04-17 | End: 2025-04-17 | Stop reason: HOSPADM

## 2025-04-16 RX ORDER — DILTIAZEM HYDROCHLORIDE 120 MG/1
360 CAPSULE, COATED, EXTENDED RELEASE ORAL NIGHTLY
Status: DISCONTINUED | OUTPATIENT
Start: 2025-04-16 | End: 2025-04-17 | Stop reason: HOSPADM

## 2025-04-16 RX ORDER — CLOPIDOGREL BISULFATE 75 MG/1
75 TABLET ORAL NIGHTLY
Status: DISCONTINUED | OUTPATIENT
Start: 2025-04-16 | End: 2025-04-17 | Stop reason: HOSPADM

## 2025-04-16 RX ORDER — ATORVASTATIN CALCIUM 80 MG/1
80 TABLET, FILM COATED ORAL EVERY MORNING
Status: DISCONTINUED | OUTPATIENT
Start: 2025-04-16 | End: 2025-04-17 | Stop reason: HOSPADM

## 2025-04-16 RX ORDER — TIZANIDINE 4 MG/1
4 TABLET ORAL 3 TIMES DAILY PRN
Status: DISCONTINUED | OUTPATIENT
Start: 2025-04-16 | End: 2025-04-17 | Stop reason: HOSPADM

## 2025-04-16 RX ORDER — TAMSULOSIN HYDROCHLORIDE 0.4 MG/1
0.4 CAPSULE ORAL NIGHTLY
Status: DISCONTINUED | OUTPATIENT
Start: 2025-04-16 | End: 2025-04-17 | Stop reason: HOSPADM

## 2025-04-16 RX ORDER — SERTRALINE HYDROCHLORIDE 50 MG/1
50 TABLET, FILM COATED ORAL EVERY MORNING
Status: DISCONTINUED | OUTPATIENT
Start: 2025-04-17 | End: 2025-04-17 | Stop reason: HOSPADM

## 2025-04-16 RX ORDER — ISOSORBIDE MONONITRATE 60 MG/1
60 TABLET, EXTENDED RELEASE ORAL NIGHTLY
Status: DISCONTINUED | OUTPATIENT
Start: 2025-04-16 | End: 2025-04-17 | Stop reason: HOSPADM

## 2025-04-16 RX ORDER — NITROGLYCERIN 0.4 MG/1
0.4 TABLET SUBLINGUAL EVERY 5 MIN PRN
Status: DISCONTINUED | OUTPATIENT
Start: 2025-04-16 | End: 2025-04-16 | Stop reason: SDUPTHER

## 2025-04-16 RX ORDER — DULOXETIN HYDROCHLORIDE 30 MG/1
60 CAPSULE, DELAYED RELEASE ORAL NIGHTLY
Status: DISCONTINUED | OUTPATIENT
Start: 2025-04-16 | End: 2025-04-17 | Stop reason: HOSPADM

## 2025-04-16 RX ORDER — SERTRALINE HYDROCHLORIDE 100 MG/1
100 TABLET, FILM COATED ORAL EVERY MORNING
Status: DISCONTINUED | OUTPATIENT
Start: 2025-04-17 | End: 2025-04-17 | Stop reason: HOSPADM

## 2025-04-16 RX ORDER — ENOXAPARIN SODIUM 100 MG/ML
40 INJECTION SUBCUTANEOUS EVERY 24 HOURS
Status: DISCONTINUED | OUTPATIENT
Start: 2025-04-16 | End: 2025-04-17 | Stop reason: HOSPADM

## 2025-04-16 RX ORDER — LOSARTAN POTASSIUM 50 MG/1
50 TABLET ORAL DAILY
Status: DISCONTINUED | OUTPATIENT
Start: 2025-04-17 | End: 2025-04-17 | Stop reason: HOSPADM

## 2025-04-16 RX ORDER — PANTOPRAZOLE SODIUM 40 MG/1
40 TABLET, DELAYED RELEASE ORAL DAILY
Status: DISCONTINUED | OUTPATIENT
Start: 2025-04-17 | End: 2025-04-17 | Stop reason: HOSPADM

## 2025-04-16 RX ORDER — HYDROXYZINE HYDROCHLORIDE 25 MG/1
25 TABLET, FILM COATED ORAL NIGHTLY PRN
Status: DISCONTINUED | OUTPATIENT
Start: 2025-04-16 | End: 2025-04-17 | Stop reason: HOSPADM

## 2025-04-16 RX ORDER — ROPINIROLE 1 MG/1
1 TABLET, FILM COATED ORAL NIGHTLY
Status: DISCONTINUED | OUTPATIENT
Start: 2025-04-16 | End: 2025-04-17 | Stop reason: HOSPADM

## 2025-04-16 RX ADMIN — ISOSORBIDE MONONITRATE 60 MG: 60 TABLET, EXTENDED RELEASE ORAL at 22:13

## 2025-04-16 RX ADMIN — DILTIAZEM HYDROCHLORIDE 360 MG: 120 CAPSULE, COATED, EXTENDED RELEASE ORAL at 22:18

## 2025-04-16 RX ADMIN — TRAZODONE HYDROCHLORIDE 100 MG: 50 TABLET ORAL at 22:13

## 2025-04-16 RX ADMIN — OXYBUTYNIN CHLORIDE 5 MG: 5 TABLET ORAL at 22:13

## 2025-04-16 RX ADMIN — TAMSULOSIN HYDROCHLORIDE 0.4 MG: 0.4 CAPSULE ORAL at 22:13

## 2025-04-16 RX ADMIN — ROPINIROLE HYDROCHLORIDE 1 MG: 1 TABLET, FILM COATED ORAL at 22:13

## 2025-04-16 RX ADMIN — DULOXETINE HYDROCHLORIDE 60 MG: 30 CAPSULE, DELAYED RELEASE ORAL at 22:13

## 2025-04-16 RX ADMIN — ATORVASTATIN CALCIUM 80 MG: 80 TABLET, FILM COATED ORAL at 22:13

## 2025-04-16 RX ADMIN — CLOPIDOGREL BISULFATE 75 MG: 75 TABLET, FILM COATED ORAL at 22:13

## 2025-04-16 RX ADMIN — PREGABALIN 50 MG: 50 CAPSULE ORAL at 22:13

## 2025-04-16 RX ADMIN — ASPIRIN 81 MG CHEWABLE TABLET 162 MG: 81 TABLET CHEWABLE at 18:21

## 2025-04-16 RX ADMIN — NITROGLYCERIN 0.4 MG: 0.4 TABLET SUBLINGUAL at 18:57

## 2025-04-16 SDOH — ECONOMIC STABILITY: FOOD INSECURITY: WITHIN THE PAST 12 MONTHS, YOU WORRIED THAT YOUR FOOD WOULD RUN OUT BEFORE YOU GOT THE MONEY TO BUY MORE.: NEVER TRUE

## 2025-04-16 SDOH — SOCIAL STABILITY: SOCIAL INSECURITY
WITHIN THE LAST YEAR, HAVE YOU BEEN KICKED, HIT, SLAPPED, OR OTHERWISE PHYSICALLY HURT BY YOUR PARTNER OR EX-PARTNER?: NO

## 2025-04-16 SDOH — ECONOMIC STABILITY: INCOME INSECURITY: IN THE PAST 12 MONTHS HAS THE ELECTRIC, GAS, OIL, OR WATER COMPANY THREATENED TO SHUT OFF SERVICES IN YOUR HOME?: NO

## 2025-04-16 SDOH — SOCIAL STABILITY: SOCIAL INSECURITY: WITHIN THE LAST YEAR, HAVE YOU BEEN AFRAID OF YOUR PARTNER OR EX-PARTNER?: NO

## 2025-04-16 SDOH — SOCIAL STABILITY: SOCIAL INSECURITY: WITHIN THE LAST YEAR, HAVE YOU BEEN HUMILIATED OR EMOTIONALLY ABUSED IN OTHER WAYS BY YOUR PARTNER OR EX-PARTNER?: NO

## 2025-04-16 SDOH — SOCIAL STABILITY: SOCIAL INSECURITY
WITHIN THE LAST YEAR, HAVE YOU BEEN RAPED OR FORCED TO HAVE ANY KIND OF SEXUAL ACTIVITY BY YOUR PARTNER OR EX-PARTNER?: NO

## 2025-04-16 SDOH — SOCIAL STABILITY: SOCIAL INSECURITY: ARE YOU OR HAVE YOU BEEN THREATENED OR ABUSED PHYSICALLY, EMOTIONALLY, OR SEXUALLY BY ANYONE?: NO

## 2025-04-16 SDOH — SOCIAL STABILITY: SOCIAL INSECURITY: ARE THERE ANY APPARENT SIGNS OF INJURIES/BEHAVIORS THAT COULD BE RELATED TO ABUSE/NEGLECT?: NO

## 2025-04-16 SDOH — ECONOMIC STABILITY: FOOD INSECURITY: WITHIN THE PAST 12 MONTHS, THE FOOD YOU BOUGHT JUST DIDN'T LAST AND YOU DIDN'T HAVE MONEY TO GET MORE.: NEVER TRUE

## 2025-04-16 SDOH — SOCIAL STABILITY: SOCIAL INSECURITY: DO YOU FEEL UNSAFE GOING BACK TO THE PLACE WHERE YOU ARE LIVING?: NO

## 2025-04-16 SDOH — SOCIAL STABILITY: SOCIAL INSECURITY: HAVE YOU HAD ANY THOUGHTS OF HARMING ANYONE ELSE?: NO

## 2025-04-16 SDOH — SOCIAL STABILITY: SOCIAL INSECURITY: HAVE YOU HAD THOUGHTS OF HARMING ANYONE ELSE?: NO

## 2025-04-16 SDOH — SOCIAL STABILITY: SOCIAL INSECURITY: HAS ANYONE EVER THREATENED TO HURT YOUR FAMILY OR YOUR PETS?: NO

## 2025-04-16 SDOH — SOCIAL STABILITY: SOCIAL INSECURITY: DO YOU FEEL ANYONE HAS EXPLOITED OR TAKEN ADVANTAGE OF YOU FINANCIALLY OR OF YOUR PERSONAL PROPERTY?: NO

## 2025-04-16 SDOH — SOCIAL STABILITY: SOCIAL INSECURITY: DOES ANYONE TRY TO KEEP YOU FROM HAVING/CONTACTING OTHER FRIENDS OR DOING THINGS OUTSIDE YOUR HOME?: NO

## 2025-04-16 SDOH — SOCIAL STABILITY: SOCIAL INSECURITY: ABUSE: ADULT

## 2025-04-16 ASSESSMENT — ACTIVITIES OF DAILY LIVING (ADL)
JUDGMENT_ADEQUATE_SAFELY_COMPLETE_DAILY_ACTIVITIES: YES
LACK_OF_TRANSPORTATION: NO
GROOMING: INDEPENDENT
FEEDING YOURSELF: INDEPENDENT
TOILETING: INDEPENDENT
BATHING: INDEPENDENT
PATIENT'S MEMORY ADEQUATE TO SAFELY COMPLETE DAILY ACTIVITIES?: YES
LACK_OF_TRANSPORTATION: NO
WALKS IN HOME: INDEPENDENT
HEARING - RIGHT EAR: FUNCTIONAL
HEARING - LEFT EAR: FUNCTIONAL
ADEQUATE_TO_COMPLETE_ADL: YES
DRESSING YOURSELF: INDEPENDENT

## 2025-04-16 ASSESSMENT — COGNITIVE AND FUNCTIONAL STATUS - GENERAL
DAILY ACTIVITIY SCORE: 24
MOBILITY SCORE: 24
DAILY ACTIVITIY SCORE: 24
CLIMB 3 TO 5 STEPS WITH RAILING: A LITTLE
PATIENT BASELINE BEDBOUND: NO
PATIENT BASELINE BEDBOUND: NO

## 2025-04-16 ASSESSMENT — COLUMBIA-SUICIDE SEVERITY RATING SCALE - C-SSRS
6. HAVE YOU EVER DONE ANYTHING, STARTED TO DO ANYTHING, OR PREPARED TO DO ANYTHING TO END YOUR LIFE?: NO
1. IN THE PAST MONTH, HAVE YOU WISHED YOU WERE DEAD OR WISHED YOU COULD GO TO SLEEP AND NOT WAKE UP?: NO
2. HAVE YOU ACTUALLY HAD ANY THOUGHTS OF KILLING YOURSELF?: NO

## 2025-04-16 ASSESSMENT — PAIN DESCRIPTION - ORIENTATION: ORIENTATION: LEFT

## 2025-04-16 ASSESSMENT — LIFESTYLE VARIABLES
SKIP TO QUESTIONS 9-10: 1
HOW OFTEN DO YOU HAVE 6 OR MORE DRINKS ON ONE OCCASION: NEVER
AUDIT-C TOTAL SCORE: 0
AUDIT-C TOTAL SCORE: 0
HOW OFTEN DO YOU HAVE A DRINK CONTAINING ALCOHOL: NEVER
HOW MANY STANDARD DRINKS CONTAINING ALCOHOL DO YOU HAVE ON A TYPICAL DAY: PATIENT DOES NOT DRINK

## 2025-04-16 ASSESSMENT — PATIENT HEALTH QUESTIONNAIRE - PHQ9
SUM OF ALL RESPONSES TO PHQ9 QUESTIONS 1 & 2: 0
1. LITTLE INTEREST OR PLEASURE IN DOING THINGS: NOT AT ALL
2. FEELING DOWN, DEPRESSED OR HOPELESS: NOT AT ALL

## 2025-04-16 ASSESSMENT — ENCOUNTER SYMPTOMS
ENDOCRINE NEGATIVE: 1
EYES NEGATIVE: 1
HEMATOLOGIC/LYMPHATIC NEGATIVE: 1
MUSCULOSKELETAL NEGATIVE: 1
FATIGUE: 1
PSYCHIATRIC NEGATIVE: 1
DIAPHORESIS: 1
ALLERGIC/IMMUNOLOGIC NEGATIVE: 1
NEUROLOGICAL NEGATIVE: 1
GASTROINTESTINAL NEGATIVE: 1
CHEST TIGHTNESS: 1

## 2025-04-16 ASSESSMENT — PAIN SCALES - GENERAL
PAINLEVEL_OUTOF10: 1
PAINLEVEL_OUTOF10: 1
PAINLEVEL_OUTOF10: 6
PAINLEVEL_OUTOF10: 1

## 2025-04-16 ASSESSMENT — PAIN - FUNCTIONAL ASSESSMENT: PAIN_FUNCTIONAL_ASSESSMENT: 0-10

## 2025-04-16 ASSESSMENT — HEART SCORE
HEART SCORE: 5
ECG: NORMAL
TROPONIN: LESS THAN OR EQUAL TO NORMAL LIMIT
AGE: 45-64
RISK FACTORS: >2 RISK FACTORS OR HX OF ATHEROSCLEROTIC DISEASE
HISTORY: HIGHLY SUSPICIOUS

## 2025-04-16 ASSESSMENT — PAIN DESCRIPTION - DESCRIPTORS
DESCRIPTORS: HEAVINESS
DESCRIPTORS: HEAVINESS

## 2025-04-16 ASSESSMENT — PAIN DESCRIPTION - LOCATION: LOCATION: CHEST

## 2025-04-16 ASSESSMENT — PAIN DESCRIPTION - PAIN TYPE: TYPE: ACUTE PAIN

## 2025-04-16 NOTE — H&P
Chief Complaint   Patient presents with    Chest Pain     Pt coming in for chest pain since Monday. Has hx of 2 heart attacks. Started with heaviness in his left chest. Has a stimulator for his chest pain so says its weird when he has chest pain. Says that it has increased to pain now and now has some numbness and tingling in his finger tips. Says he hasn't had that type of sensation in a couple of years. Says that he told his cardiologist and they told him to come in.          HPI    Logan Campos is a 59 y.o. male with a PMHx of HTN, HLD, CAD s/p 2 stents, DM, depression/anxiety, GERD, carpal tunnel, chronic chest pain was pain stimulator who presented to [facility] on 4/16/2025 with a chief complaint of chest pain.  Patient is accompanied by his wife at bedside.  He described the pain as heaviness, that was intermittent, started on Monday and has been becoming progressively worse that was accompanied by fatigue.  He stated that he has chronic chest pain, which is controlled by his pain stimulator but this was more than usual.  His pain was also radiating to his left hand and jaw.  Patient was also having nausea but no vomiting, no shortness of breath, the pain was not changed by position, pain was mainly located in the left chest and radiates to the jaw and left hand.  He rated the pain as 7/10 in severity.  He was not able to tell if there was any accompanied lightheadedness.  He denied having any recent cough, fever or chills.  He took about 5 nitroglycerin on the day of admission for the pain. he some headache after taking nitro.  He lost about about 100 pounds since 2021, that was intentional.  He denies any change in vision, difficulty swallowing, change in hearing, fever/chills, palpitation,  abdominal pain,   change in bowel habits/urine, joint pain/swelling, weakness in any of the extremities or swelling, insomnia or any symptoms of depression.  He lives with his wife, and feels safe at home.     ROS: 10 point  review of systems negative with the exception of above.    ED Course:  In the ED, BNP was normal, troponin was negative x2, EKG with no acute ischemic changes, magnesium elevated to 2.41.  Chest x-ray with no acute changes  ED Triage Vitals [04/16/25 1558]   Temperature Heart Rate Respirations BP   36.4 °C (97.5 °F) 100 16 110/64      Pulse Ox Temp Source Heart Rate Source Patient Position   96 % Tympanic Monitor Sitting      BP Location FiO2 (%)     Right arm --         Labs:  Abnormal Labs Reviewed   MAGNESIUM - Abnormal; Notable for the following components:       Result Value    Magnesium 2.41 (*)     All other components within normal limits   COMPREHENSIVE METABOLIC PANEL - Abnormal; Notable for the following components:    Glucose 59 (*)     All other components within normal limits        No orders to display       XR chest 2 views   Final Result   Minimal linear possible plate atelectasis or scarring in the left   lung base. Spinal stimulator wires in place as above.        MACRO:   None.        Signed by: Marissa Grider 4/16/2025 4:14 PM   Dictation workstation:   [id number]        XR chest 2 views   Final Result   Minimal linear possible plate atelectasis or scarring in the left   lung base. Spinal stimulator wires in place as above.        MACRO:   None.        Signed by: Marissa Grider 4/16/2025 4:14 PM   Dictation workstation:   [id number]              Intervention: In ED, patient received   Medications   enoxaparin (Lovenox) syringe 40 mg (40 mg subcutaneous Not Given 4/16/25 1818)   polyethylene glycol (Glycolax, Miralax) packet 17 g (has no administration in time range)   atorvastatin (Lipitor) tablet 80 mg (has no administration in time range)   clopidogrel (Plavix) tablet 75 mg (has no administration in time range)   DULoxetine (Cymbalta) DR capsule 60 mg (has no administration in time range)   isosorbide mononitrate ER (Imdur) 24 hr tablet 60 mg (has no administration in time range)   losartan  (Cozaar) tablet 50 mg (has no administration in time range)   pantoprazole (ProtoNix) EC tablet 40 mg (has no administration in time range)   pregabalin (Lyrica) capsule 50 mg (has no administration in time range)   rOPINIRole (Requip) tablet 1 mg (has no administration in time range)   sertraline (Zoloft) tablet 100 mg (has no administration in time range)   sertraline (Zoloft) tablet 50 mg (has no administration in time range)   tamsulosin (Flomax) 24 hr capsule 0.4 mg (has no administration in time range)   dilTIAZem CD (Cardizem CD) 24 hr capsule 360 mg (has no administration in time range)   traZODone (Desyrel) tablet 100 mg (has no administration in time range)   oxybutynin (Ditropan) tablet 5 mg (has no administration in time range)   EPINEPHrine (Epipen) injection syringe 0.3 mg (has no administration in time range)   nitroglycerin (Nitrostat) SL tablet 0.4 mg (has no administration in time range)   oxyCODONE-acetaminophen (Percocet) 5-325 mg per tablet 1 tablet (has no administration in time range)   tiZANidine (Zanaflex) tablet 4 mg (has no administration in time range)   aspirin chewable tablet 81 mg (has no administration in time range)   hydrOXYzine HCL (Atarax) tablet 25 mg (has no administration in time range)   aspirin chewable tablet 162 mg (162 mg oral Given 4/16/25 1821)      Patient was then transferred to the floor for further management      Meds:   Modified Medications    No medications on file       Follows up with Dr. Magda Gilman DO      Past Medical History   Medical History[1]   Surgical History   Surgical History[2]  Family History   Family History[3]  Social History     Tobacco Use: Low Risk  (3/17/2025)    Patient History     Smoking Tobacco Use: Never     Smokeless Tobacco Use: Never     Passive Exposure: Never      Social History     Substance and Sexual Activity   Alcohol Use Yes    Alcohol/week: 3.0 standard drinks of alcohol    Types: 3 Shots of liquor per week     "Comment: social      Allergies   RX Allergies[4]   Meds    Scheduled medications  Scheduled Medications[5]  Continuous medications  Continuous Medications[6]  PRN medications  PRN Medications[7]   Objective     Vitals  Visit Vitals  /76   Pulse 84   Temp 36.4 °C (97.5 °F) (Tympanic)   Resp 15   Ht 1.727 m (5' 8\")   Wt 88.5 kg (195 lb)   SpO2 95%   BMI 29.65 kg/m²   Smoking Status Never   BSA 2.06 m²        Review of Systems   Constitutional:  Positive for diaphoresis and fatigue.   HENT: Negative.     Eyes: Negative.    Respiratory:  Positive for chest tightness.    Cardiovascular:  Positive for chest pain.   Gastrointestinal: Negative.    Endocrine: Negative.    Genitourinary: Negative.    Musculoskeletal: Negative.    Skin: Negative.    Allergic/Immunologic: Negative.    Neurological: Negative.    Hematological: Negative.    Psychiatric/Behavioral: Negative.       Temperature:  [36.4 °C (97.5 °F)] 36.4 °C (97.5 °F)  Heart Rate:  [] 84  Respirations:  [15-20] 15  BP: (110-118)/(64-81) 118/76  No intake/output data recorded.  No intake/output data recorded.  Physical Exam  Constitutional:       General: He is not in acute distress.     Appearance: Normal appearance. He is normal weight.   HENT:      Head: Normocephalic and atraumatic.      Right Ear: Tympanic membrane normal.      Left Ear: Tympanic membrane normal.      Nose: Nose normal.      Mouth/Throat:      Mouth: Mucous membranes are moist.      Pharynx: Oropharynx is clear.   Eyes:      General: No scleral icterus.        Right eye: No discharge.         Left eye: No discharge.      Extraocular Movements: Extraocular movements intact.      Conjunctiva/sclera: Conjunctivae normal.      Pupils: Pupils are equal, round, and reactive to light.   Neck:      Vascular: No carotid bruit.   Cardiovascular:      Rate and Rhythm: Normal rate and regular rhythm.      Pulses: Normal pulses.      Heart sounds: No murmur heard.     No friction rub. No gallop. " "  Abdominal:      General: Bowel sounds are normal. There is no distension.      Palpations: Abdomen is soft. There is no mass.      Tenderness: There is no abdominal tenderness. There is no right CVA tenderness, left CVA tenderness, guarding or rebound.      Hernia: No hernia is present.   Musculoskeletal:         General: No swelling, tenderness, deformity or signs of injury.      Cervical back: Normal range of motion and neck supple. No rigidity or tenderness.      Right lower leg: No edema.      Left lower leg: No edema.   Lymphadenopathy:      Cervical: No cervical adenopathy.   Skin:     General: Skin is warm.      Coloration: Skin is not jaundiced or pale.      Findings: No bruising, erythema, lesion or rash.   Neurological:      General: No focal deficit present.      Mental Status: He is alert and oriented to person, place, and time. Mental status is at baseline.   Psychiatric:         Mood and Affect: Mood normal.         Behavior: Behavior normal.         Thought Content: Thought content normal.         Judgment: Judgment normal.       Assessment & Plan  Chest pain          I/Os  No intake or output data in the 24 hours ending 04/16/25 1922      Labs:   Results from last 72 hours   Lab Units 04/16/25  1620   SODIUM mmol/L 142   POTASSIUM mmol/L 3.7   CHLORIDE mmol/L 107   CO2 mmol/L 27   BUN mg/dL 14   CREATININE mg/dL 1.28   GLUCOSE mg/dL 59*   CALCIUM mg/dL 9.1   ANION GAP mmol/L 12   EGFR mL/min/1.73m*2 65      Results from last 72 hours   Lab Units 04/16/25  1620   WBC AUTO x10*3/uL 8.0   HEMOGLOBIN g/dL 13.8   HEMATOCRIT % 42.9   PLATELETS AUTO x10*3/uL 226   NEUTROS PCT AUTO % 61.0   LYMPHS PCT AUTO % 29.8   MONOS PCT AUTO % 6.7   EOS PCT AUTO % 1.5      Lab Results   Component Value Date    CALCIUM 9.1 04/16/2025    PHOS 3.4 03/07/2023      No results found for: \"CRP\"   [unfilled]     Micro/ID:   No results found for the last 90 days.                   No lab exists for component: \"AGALPCRNB\" " "  .ID  No results found for: \"URINECULTURE\", \"BLOODCULT\", \"CSFCULTSMEAR\"  Images    XR chest 2 views  Narrative: Interpreted By:  Marissa Grider,   STUDY:  XR CHEST 2 VIEWS;  4/16/2025 4:08 pm      INDICATION:  Signs/Symptoms:Chest Pain.          COMPARISON:  11/21/2016      ACCESSION NUMBER(S):  [id number]      ORDERING CLINICIAN:  DEBORAH ZACARIAS      FINDINGS:  A total of three views were obtained. Faint band density in the  medial left lung base. No pleural effusion or pneumothorax  identified. The cardiac silhouette is within normal limits for size.  Spinal stimulator wires coiled over the midline lower back with tips  near the cervicothoracic junction.      Impression: Minimal linear possible plate atelectasis or scarring in the left  lung base. Spinal stimulator wires in place as above.      MACRO:  None.      Signed by: Marissa Grider 4/16/2025 4:14 PM  Dictation workstation:   [id number]    Assessment and Plan    Logan Campos is a 59 y.o. male admitted for chest pain    Acute Medical Issues   # Chest pain:  #ACS rule out(less likely):  - Patient presented with chest pain for 2 days, the pain is nonreproducible, nonpleuritic, non positional.  - Troponin negative x 2, no ischemic changes on EKG.  - Monitor on telemetry, cardiology consulted; appreciate recs.  - Continue Plavix and aspirin, as well as atorvastatin.        Chronic Medical Issues   #HTN/CAD:  -Continue diltiazem ER, 360 mg, oral, Nightly, isosorbide mononitrate ER, 60 mg, oral, Nightly, losartan, 50 mg, oral, Daily    #Muscle spasm/pain:  - Duloxetine, 60 mg, oral, Nightly      #IDDM type 2  - Hold home meds and start insulin lispro SSI    #Bladder contractions:  oxybutynin, 5 mg, oral, 4x daily    #GERD  -continue pantoprazole, 40 mg, oral, Daily    #Carpel tunnel   - Continue pregabalin, 50 mg, oral, TID    #Rest leg syndrome   - Continue ropinirole, 1 mg, oral, Nightly      #Depression/anxiety   -Continue sertraline, 150 mg, oral, q " AM      #BPH   - Continue tamsulosin, 0.4 mg, oral, Nightly    #Insomnia;  - On trazodone, 100 mg, oral, Nightly      F: PO intake & IVF PRN  E: Replete as needed  N: Cardiac  diet  GI ppx: Protonix 40 mg daily   DVT ppx: Lovenox subcutaneous  Antibiotics: None  Tubes/Lines/Drains:   Oxygenation: RA    Code Status: Full Code   Emergency Contact: Extended Emergency Contact Information  Primary Emergency Contact: RUBEN CACERES  Address: [address] [address] [place]  Mobile Phone: [phone]  Relation: Spouse   needed? No     Disposition: 59 y.o.male admitted for chest pain, Anticipate LOS  <2 midnights.         Wil Patrick  Internal Medicine, Hospitalist   [facility]  [place]         [1]   Past Medical History:  Diagnosis Date    Anxiety     Arthritis     Coronary artery disease     Depression     Diabetes mellitus (Multi)     Diverticulosis     Dizziness 12/25/2024    Ear pain 2024    Hiatal hernia     HL (hearing loss) 12/25/2024    Hypertension     Lumbar disc disease     Myocardial infarction (Multi)     Personal history of other diseases of the circulatory system     History of coronary atherosclerosis    Personal history of other diseases of the digestive system     History of gastroesophageal reflux (GERD)    Personal history of other diseases of the nervous system and sense organs     History of neuropathy    Pure hypercholesterolemia, unspecified     High cholesterol    Tinnitus 1992    Vision loss    [2]   Past Surgical History:  Procedure Laterality Date    ADENOIDECTOMY  1973    BACK SURGERY  12/19/2014    Back Surgery    CARDIAC CATHETERIZATION      COLONOSCOPY      CORONARY STENT PLACEMENT      HERNIA REPAIR  12/19/2014    Hernia Repair    MENISCECTOMY      OTHER SURGICAL HISTORY  12/19/2014    Arterial Catheterization    TONSILLECTOMY  12/19/2014    Tonsillectomy    VASECTOMY  12/19/2014    Surgery Vas Deferens Vasectomy   [3]    Family History  Problem Relation Name Age of Onset    Stroke Father Ben Campos Sr     Hearing loss Father Ben Campos Sr     Breast cancer Sister      Stomach cancer Maternal Grandmother Sade Corbin     Cancer Maternal Grandmother Sade Corbin     Cancer Sister Shemar Ralph     Cancer Sister Karol Campos    [4]   Allergies  Allergen Reactions    Cat Dander Shortness of breath    Onion Anaphylaxis    Hydromorphone Rash and Other     Agent: Dilaudid   [5] [START ON 4/17/2025] aspirin, 81 mg, oral, Daily  atorvastatin, 80 mg, oral, q AM  clopidogrel, 75 mg, oral, Nightly  dilTIAZem ER, 360 mg, oral, Nightly  DULoxetine, 60 mg, oral, Nightly  enoxaparin, 40 mg, subcutaneous, q24h  isosorbide mononitrate ER, 60 mg, oral, Nightly  [START ON 4/17/2025] losartan, 50 mg, oral, Daily  oxybutynin, 5 mg, oral, 4x daily  [START ON 4/17/2025] pantoprazole, 40 mg, oral, Daily  pregabalin, 50 mg, oral, TID  rOPINIRole, 1 mg, oral, Nightly  [START ON 4/17/2025] sertraline, 100 mg, oral, q AM  [START ON 4/17/2025] sertraline, 50 mg, oral, q AM  tamsulosin, 0.4 mg, oral, Nightly  traZODone, 100 mg, oral, Nightly  [6]    [7] PRN medications: EPINEPHrine, hydrOXYzine HCL, nitroglycerin, oxyCODONE-acetaminophen, polyethylene glycol, tiZANidine

## 2025-04-16 NOTE — ED PROVIDER NOTES
HPI   Chief Complaint   Patient presents with    Chest Pain     Pt coming in for chest pain since Monday. Has hx of 2 heart attacks. Started with heaviness in his left chest. Has a stimulator for his chest pain so says its weird when he has chest pain. Says that it has increased to pain now and now has some numbness and tingling in his finger tips. Says he hasn't had that type of sensation in a couple of years. Says that he told his cardiologist and they told him to come in.          59-year-old male comes into the emergency department with chest pain that has been intermittent and described as a heaviness for the last 2 days.  Patient has a stimulator due to his history of chest pain.  He says that the pain that he is feeling at this location is different than his baseline.  Patient has taken a total of 4 nitroglycerin since this morning due to the pain which has helped him to alleviate it.  Patient has not had his aspirin today yet.  Patient is on Plavix.  He states that sometimes the pain radiates to his left arm and feels like a tingling (no numbness) sensation but is not currently present.  Patient denies any fever, cough, congestion, vomiting, abdominal pain, shortness of breath, recent traveling, DVT/PE history or any other complaints at this moment.  Patient called his cardiologist and they instructed him to come into the emergency department for further evaluation.              Patient History   Medical History[1]  Surgical History[2]  Family History[3]  Social History[4]    Physical Exam   ED Triage Vitals [04/16/25 1558]   Temperature Heart Rate Respirations BP   36.4 °C (97.5 °F) 100 16 110/64      Pulse Ox Temp Source Heart Rate Source Patient Position   96 % Tympanic Monitor Sitting      BP Location FiO2 (%)     Right arm --       Physical Exam  Vitals and nursing note reviewed.   Constitutional:       General: He is not in acute distress.     Appearance: Normal appearance. He is well-developed. He is  not ill-appearing or toxic-appearing.   HENT:      Head: Atraumatic.      Nose: Nose normal.      Mouth/Throat:      Mouth: Mucous membranes are moist.   Eyes:      Extraocular Movements: Extraocular movements intact.      Conjunctiva/sclera: Conjunctivae normal.   Cardiovascular:      Rate and Rhythm: Normal rate and regular rhythm.      Pulses: Normal pulses.   Pulmonary:      Effort: Pulmonary effort is normal.      Breath sounds: Normal breath sounds.   Abdominal:      General: Abdomen is flat. Bowel sounds are normal.      Palpations: Abdomen is soft.   Musculoskeletal:         General: No swelling or deformity. Normal range of motion.      Cervical back: Normal range of motion and neck supple. No rigidity.   Skin:     General: Skin is warm.      Capillary Refill: Capillary refill takes less than 2 seconds.   Neurological:      General: No focal deficit present.      Mental Status: He is alert and oriented to person, place, and time. Mental status is at baseline.      GCS: GCS eye subscore is 4. GCS verbal subscore is 5. GCS motor subscore is 6.      Cranial Nerves: Cranial nerves 2-12 are intact. No cranial nerve deficit.      Sensory: Sensation is intact.      Motor: No weakness.   Psychiatric:         Mood and Affect: Mood normal.       Labs Reviewed   MAGNESIUM - Abnormal       Result Value    Magnesium 2.41 (*)    COMPREHENSIVE METABOLIC PANEL - Abnormal    Glucose 59 (*)     Sodium 142      Potassium 3.7      Chloride 107      Bicarbonate 27      Anion Gap 12      Urea Nitrogen 14      Creatinine 1.28      eGFR 65      Calcium 9.1      Albumin 4.6      Alkaline Phosphatase 68      Total Protein 6.9      AST 24      Bilirubin, Total 0.4      ALT 15     B-TYPE NATRIURETIC PEPTIDE - Normal    BNP 56      Narrative:        <100 pg/mL - Heart failure unlikely  100-299 pg/mL - Intermediate probability of acute heart                  failure exacerbation. Correlate with clinical                  context and  patient history.    >=300 pg/mL - Heart Failure likely. Correlate with clinical                  context and patient history.    BNP testing is performed using different testing methodology at [facility] (formerly [facility]) than at other [facility]. Direct result comparisons should only be made within the same method.      SERIAL TROPONIN-INITIAL - Normal    Troponin I, High Sensitivity 3      Narrative:     Less than 99th percentile of normal range cutoff-  Female and children under 18 years old <14 ng/L; Male <21 ng/L: Negative  Repeat testing should be performed if clinically indicated.     Female and children under 18 years old 14-50 ng/L; Male 21-50 ng/L:  Consistent with possible cardiac damage and possible increased clinical   risk. Serial measurements may help to assess extent of myocardial damage.     >50 ng/L: Consistent with cardiac damage, increased clinical risk and  myocardial infarction. Serial measurements may help assess extent of   myocardial damage.      NOTE: Children less than 1 year old may have higher baseline troponin   levels and results should be interpreted in conjunction with the overall   clinical context.     NOTE: Troponin I testing is performed using a different   testing methodology at [facility] ([facility] than at other   [facility]. Direct result comparisons should only   be made within the same method.   SERIAL TROPONIN, 1 HOUR - Normal    Troponin I, High Sensitivity <3      Narrative:     Less than 99th percentile of normal range cutoff-  Female and children under 18 years old <14 ng/L; Male <21 ng/L: Negative  Repeat testing should be performed if clinically indicated.     Female and children under 18 years old 14-50 ng/L; Male 21-50 ng/L:  Consistent with possible cardiac damage and possible increased clinical   risk. Serial measurements may help to assess extent of myocardial damage.     >50 ng/L: Consistent with cardiac damage, increased clinical risk  and  myocardial infarction. Serial measurements may help assess extent of   myocardial damage.      NOTE: Children less than 1 year old may have higher baseline troponin   levels and results should be interpreted in conjunction with the overall   clinical context.     NOTE: Troponin I testing is performed using a different   testing methodology at [facility] than at other   [facility]. Direct result comparisons should only   be made within the same method.   TROPONIN I, HIGH SENSITIVITY - Normal    Troponin I, High Sensitivity 3      Narrative:     Less than 99th percentile of normal range cutoff-  Female and children under 18 years old <14 ng/L; Male <21 ng/L: Negative  Repeat testing should be performed if clinically indicated.     Female and children under 18 years old 14-50 ng/L; Male 21-50 ng/L:  Consistent with possible cardiac damage and possible increased clinical   risk. Serial measurements may help to assess extent of myocardial damage.     >50 ng/L: Consistent with cardiac damage, increased clinical risk and  myocardial infarction. Serial measurements may help assess extent of   myocardial damage.      NOTE: Children less than 1 year old may have higher baseline troponin   levels and results should be interpreted in conjunction with the overall   clinical context.     NOTE: Troponin I testing is performed using a different   testing methodology at [facility] than at other   [facility]. Direct result comparisons should only   be made within the same method.   POCT GLUCOSE - Normal    POCT Glucose 90     CBC WITH AUTO DIFFERENTIAL    WBC 8.0      nRBC 0.0      RBC 4.56      Hemoglobin 13.8      Hematocrit 42.9      MCV 94      MCH 30.3      MCHC 32.2      RDW 12.5      Platelets 226      Neutrophils % 61.0      Immature Granulocytes %, Automated 0.4      Lymphocytes % 29.8      Monocytes % 6.7      Eosinophils % 1.5      Basophils % 0.6      Neutrophils Absolute 4.88       Immature Granulocytes Absolute, Automated 0.03      Lymphocytes Absolute 2.39      Monocytes Absolute 0.54      Eosinophils Absolute 0.12      Basophils Absolute 0.05     TROPONIN SERIES- (INITIAL, 1 HR)    Narrative:     The following orders were created for panel order Troponin I Series, High Sensitivity (0, 1 HR).  Procedure                               Abnormality         Status                     ---------                               -----------         ------                     Troponin I, High Sensiti...[[id number]]  Normal              Final result               Troponin, High Sensitivi...[[id number]]  Normal              Final result                 Please view results for these tests on the individual orders.   CBC   RENAL FUNCTION PANEL   MAGNESIUM      4/16/2025  XR chest 2 views   Final Result   Minimal linear possible plate atelectasis or scarring in the left   lung base. Spinal stimulator wires in place as above.        MACRO:   None.        Signed by: Marissa Grider 4/16/2025 4:14 PM   Dictation workstation:   [id number]           Medications   enoxaparin (Lovenox) syringe 40 mg (40 mg subcutaneous Not Given 4/16/25 1818)   polyethylene glycol (Glycolax, Miralax) packet 17 g (has no administration in time range)   atorvastatin (Lipitor) tablet 80 mg (80 mg oral Given 4/16/25 2213)   clopidogrel (Plavix) tablet 75 mg (75 mg oral Given 4/16/25 2213)   DULoxetine (Cymbalta) DR capsule 60 mg (60 mg oral Given 4/16/25 2213)   isosorbide mononitrate ER (Imdur) 24 hr tablet 60 mg (60 mg oral Given 4/16/25 2213)   losartan (Cozaar) tablet 50 mg (has no administration in time range)   pantoprazole (ProtoNix) EC tablet 40 mg (has no administration in time range)   pregabalin (Lyrica) capsule 50 mg (50 mg oral Given 4/16/25 2213)   rOPINIRole (Requip) tablet 1 mg (1 mg oral Given 4/16/25 2213)   sertraline (Zoloft) tablet 100 mg (has no administration in time range)   sertraline (Zoloft) tablet 50 mg  (has no administration in time range)   tamsulosin (Flomax) 24 hr capsule 0.4 mg (0.4 mg oral Given 4/16/25 2213)   dilTIAZem CD (Cardizem CD) 24 hr capsule 360 mg (360 mg oral Given 4/16/25 2218)   traZODone (Desyrel) tablet 100 mg (100 mg oral Given 4/16/25 2213)   oxybutynin (Ditropan) tablet 5 mg (5 mg oral Given 4/16/25 2213)   EPINEPHrine (Epipen) injection syringe 0.3 mg (has no administration in time range)   nitroglycerin (Nitrostat) SL tablet 0.4 mg (has no administration in time range)   oxyCODONE-acetaminophen (Percocet) 5-325 mg per tablet 1 tablet (has no administration in time range)   tiZANidine (Zanaflex) tablet 4 mg (has no administration in time range)   aspirin chewable tablet 81 mg (has no administration in time range)   hydrOXYzine HCL (Atarax) tablet 25 mg (has no administration in time range)   insulin lispro injection 0-10 Units (has no administration in time range)   aspirin chewable tablet 162 mg (162 mg oral Given 4/16/25 1821)          ED Course & MDM   ED Course as of 04/17/25 0017 Wed Apr 16, 2025 1706 EKG completed.  Ventricular rate of 105.  Sinus tachycardia.  No QRS widening.  No axis deviation.  Nonspecific T wave changes.  No STEMI. [AM]   1726 Results were discussed with patient and wife at bedside.  At this moment patient agree with admission to the hospital for further cardiac evaluation.  Patient had his aspirin earlier today but he had had nitroglycerin throughout the day.  Aspirin will be added now. [AM]   1740 Dr. Patrick called back.  Case was discussed with him.  He agreed with admission under observation for further cardiac evaluation. [AM]   1833 Second troponin is negative [AM]      ED Course User Index  [AM] Marian Burton MD         Diagnoses as of 04/17/25 0017   Chest pain, unspecified type                 No data recorded     [place] Coma Scale Score: 15 (04/16/25 2232 : Nilsa Lucas RN) HEART Score: 5 (04/16/25 1707 : Marian Burton MD)                          Medical Decision Making      Procedure  Procedures       Marian Burton MD  04/16/25 1741       [1]   Past Medical History:  Diagnosis Date    Anxiety     Arthritis     Coronary artery disease     Depression     Diabetes mellitus (Multi)     Diverticulosis     Dizziness 12/25/2024    Ear pain 2024    Hiatal hernia     HL (hearing loss) 12/25/2024    Hypertension     Lumbar disc disease     Myocardial infarction (Multi)     Personal history of other diseases of the circulatory system     History of coronary atherosclerosis    Personal history of other diseases of the digestive system     History of gastroesophageal reflux (GERD)    Personal history of other diseases of the nervous system and sense organs     History of neuropathy    Pure hypercholesterolemia, unspecified     High cholesterol    Tinnitus 1992    Vision loss    [2]   Past Surgical History:  Procedure Laterality Date    ADENOIDECTOMY  1973    BACK SURGERY  12/19/2014    Back Surgery    CARDIAC CATHETERIZATION      COLONOSCOPY      CORONARY STENT PLACEMENT      HERNIA REPAIR  12/19/2014    Hernia Repair    MENISCECTOMY      OTHER SURGICAL HISTORY  12/19/2014    Arterial Catheterization    TONSILLECTOMY  12/19/2014    Tonsillectomy    VASECTOMY  12/19/2014    Surgery Vas Deferens Vasectomy   [3]   Family History  Problem Relation Name Age of Onset    Stroke Father Bennatasha Campos Sr     Hearing loss Father Ben Campos Sr     Breast cancer Sister      Stomach cancer Maternal Grandmother Sade Emerald     Cancer Maternal Grandmother Sade Corbin     Cancer Sister Shemar [place]     Cancer Sister Kraol Andres    [4]   Social History  Tobacco Use    Smoking status: Never     Passive exposure: Never    Smokeless tobacco: Never   Vaping Use    Vaping status: Never Used   Substance Use Topics    Alcohol use: Yes     Alcohol/week: 3.0 standard drinks of alcohol     Types: 3 Shots of liquor per week     Comment: social    Drug use: Yes     Frequency:  7.0 times per week     Types: Marijuana     Comment: Medical Marijuana        Marian Burton MD  04/17/25 0018

## 2025-04-16 NOTE — PROGRESS NOTES
Pharmacy Medication History Review    Logan Campos is a 59 y.o. male admitted for Chest pain. Pharmacy reviewed the patient's [id number] medications and allergies for accuracy.    The list below reflectives the updated PTA list. Please review each medication in order reconciliation for additional clarification and justification.  Prior to Admission medications    Medication Sig Start Date End Date Taking? Authorizing Provider   aspirin 325 mg tablet Take 1 tablet (325 mg) by mouth once daily at bedtime. 12/19/14  Yes Historical Provider, MD   atorvastatin (Lipitor) 80 mg tablet Take 1 tablet (80 mg) by mouth once daily.  Patient taking differently: Take 1 tablet (80 mg) by mouth once daily in the morning. 10/28/24  Yes Magda Gilman DO   cholecalciferol (Vitamin D-3) 5,000 Units tablet Take 1 tablet (125 mcg) by mouth once daily.   Yes Historical Provider, MD   chrm/vineg/bit-orang peel/gr t (APPLE CIDER VINEGAR PLUS ORAL) Take 2 capsules by mouth once daily.   Yes Historical Provider, MD   clopidogrel (Plavix) 75 mg tablet Take 1 tablet (75 mg) by mouth once daily.  Patient taking differently: Take 1 tablet (75 mg) by mouth once daily at bedtime. 2/12/25  Yes MICHAEL Schreiber-CNP   dapaglifloz propaned-metformin (Xigduo XR) 5-1,000 mg Take 1 tablet by mouth once daily. Take with food. 11/21/24  Yes Magda Gilman DO   DULoxetine (Cymbalta) 60 mg DR capsule Take 1 capsule (60 mg) by mouth once daily.  Patient taking differently: Take 1 capsule (60 mg) by mouth once daily at bedtime. 4/5/24  Yes Maira Stuart MD   fish oil concentrate (Omega-3) 120-180 mg capsule Take 6 capsules (6,000 mg) by mouth once daily in the morning. 12/19/14  Yes Historical Provider, MD   hydrOXYzine HCL (Atarax) 25 mg tablet Take 1-2 tablets (25-50 mg) by mouth as needed at bedtime (insomnia). 2/16/24  Yes Historical Provider, MD   isosorbide mononitrate ER (Imdur) 30 mg 24 hr tablet Take 2 tablets (60 mg) by mouth  once daily.  Patient taking differently: Take 2 tablets (60 mg) by mouth once daily at bedtime. 10/28/24  Yes Magda Gilman DO   ketamine HCl (ketamine, bulk,) 100 % powder Ketamine 100 mg/mL + lidocaine 40 mg/mL 1 puff 4 times daily as needed, DSP#20 mL x 5 refills 12/18/24  Yes AMOS Shelton   lidocaine HCl, bulk, 100 % powder powder In ketamine nasal spray 4/6/23  Yes Historical Provider, MD   losartan (Cozaar) 50 mg tablet Take 1 tablet (50 mg) by mouth once daily. 10/28/24  Yes Magda Gilman DO   nitroglycerin (Nitrostat) 0.4 mg SL tablet Place 1 tablet (0.4 mg) under the tongue every 5 minutes if needed for chest pain.   Yes Historical Provider, MD   NON FORMULARY Alive veggie based vitamins   Yes Historical Provider, MD   NON FORMULARY Medical Marijuana   Yes Historical Provider, MD   pantoprazole (ProtoNix) 40 mg EC tablet Take 1 tablet (40 mg) by mouth once daily. Do not crush, chew, or split. 1/9/25  Yes Magda Gilman DO   pregabalin (Lyrica) 50 mg capsule TAKE 1 CAPSULE (50 MG) BY MOUTH 3 TIMES A DAY. 4/14/25 5/14/25 Yes AMOS Shelton   promethazine (Phenergan) 25 mg tablet Take 1 tablet (25 mg) by mouth every 6 hours if needed for nausea. 2/18/25  Yes Magda Gilman DO   rOPINIRole (Requip) 1 mg tablet TAKE 1 TABLET (1 MG) BY MOUTH ONCE DAILY AT BEDTIME. 3/3/25  Yes Magda Gilman DO   sertraline (Zoloft) 100 mg tablet Take 1 tablet (100 mg) by mouth once daily in the morning. Take with 50 mg tab for total daily dose 150 mg 1/19/25  Yes Historical Provider, MD   sertraline (Zoloft) 50 mg tablet Take 1 tablet (50 mg) by mouth once daily in the morning. Take with 100 mg tab for total daily dose 150 mg   Yes Historical Provider, MD   tamsulosin (Flomax) 0.4 mg 24 hr capsule Take 1 capsule (0.4 mg) by mouth once daily.  Patient taking differently: Take 1 capsule (0.4 mg) by mouth once daily at bedtime. 12/4/24  Yes Eric Preston MD   Tiadylt  mg 24 hr capsule Take 1  capsule (360 mg) by mouth once daily.  Patient taking differently: Take 1 capsule (360 mg) by mouth once daily at bedtime. 5/23/24 5/23/25 Yes AMOS Vuong   tirzepatide (Mounjaro) 7.5 mg/0.5 mL pen injector Inject 7.5 mg under the skin every 7 days.  Patient taking differently: Inject 7.5 mg under the skin 1 (one) time per week. Every Saturday 11/6/24  Yes Magda Gilman DO   tiZANidine (Zanaflex) 4 mg tablet TAKE 1 TABLET (4 MG) BY MOUTH 3 TIMES A DAY AS NEEDED FOR MUSCLE SPASMS. 12/23/24  Yes Maira Stuart MD   traZODone (Desyrel) 100 mg tablet Take 1 tablet (100 mg) by mouth once daily at bedtime. 2/28/25  Yes Magda Gilman DO   trospium (Sanctura XR) 60 mg 24 hour capsule Take 1 capsule (60 mg) by mouth once daily.  Patient taking differently: Take 1 capsule (60 mg) by mouth once daily in the morning. 12/4/24 11/29/25 Yes Eric Preston MD   VITAMIN B COMPLEX ORAL Take 1 tablet by mouth once daily in the morning.   Yes Historical Provider, MD   cetirizine (ZyrTEC) 10 mg tablet Take 1 tablet (10 mg) by mouth once daily.  Patient not taking: Reported on 4/16/2025 12/26/24 1/25/25 no AMOS Melchor   EPINEPHrine (EpiPen) 0.3 mg/0.3 mL injection syringe Inject 0.3 mL (0.3 mg) into the muscle 1 time if needed for anaphylaxis. Inject into upper leg. Call 911 after use.   yes Historical Provider, MD   fluticasone (Flonase) 50 mcg/actuation nasal spray Administer 1 spray into each nostril once daily. Shake gently. Before first use, prime pump. After use, clean tip and replace cap.  Patient not taking: Reported on 4/16/2025 12/26/24 1/25/25 no AMOS Melchor   hydrOXYzine pamoate (Vistaril) 50 mg capsule Take 1 capsule (50 mg) by mouth 1 time.   no Historical Provider, MD   Myrbetriq 50 mg tablet extended release 24 hr 24 hr tablet Take 1 tablet (50 mg) by mouth once daily.  Patient not taking: Reported on 4/16/2025 12/26/23  no Eric Preston MD   oxyCODONE-acetaminophen  (Percocet) 5-325 mg tablet Take 1 tablet by mouth every 4 hours if needed for severe pain (7 - 10).  Patient not taking: Reported on 4/16/2025 10/28/24  no Magda Gilman,    pregabalin (Lyrica) 50 mg capsule Take 1 capsule (50 mg) by mouth 3 times a day. 3/11/25 4/14/25 Yes MICHAEL Shelton-CNP        The list below reflectives the updated allergy list. Please review each documented allergy for additional clarification and justification.  Allergies  Reviewed by Kevin Marin RN on 4/16/2025        Severity Reactions Comments    Cat Dander High Shortness of breath     Onion High Anaphylaxis     Hydromorphone Medium Rash, Other Agent: Dilaudid            Below are additional concerns with the patient's PTA list.      Alma Harrison

## 2025-04-16 NOTE — ED TRIAGE NOTES
Secondary to patient volumes and overcrowding, I performed a brief medical screening exam of the patient in triage, as the patient awaits space in the main ED.    History of Present Illness:  Logan Campos presents with chest heaviness for the past 2 days.  Was sent in by his cardiologist for cardiac workup.  Has history of multiple MIs and stents.    Physical Exam:  General - In no acute distress  Respiratory - Breathing comfortably  Cardiac - Normal S1, S2, no m/g/r  Neurologic - Moving all extremities    Medical Decision Making:  Patient will require further evaluation in the main ED.    Initial diagnostic tests were ordered from triage.      The patient demonstrates understanding that this initial evaluation is a brief medical screening exam and the expectation is that they await for space in the main ED to be further evaluated.  The patient understands that, if they leave prior to further evaluation in the main ED after this initial evaluation in triage, they are doing so under their own accord knowing that their evaluation/work-up is not yet complete. The patient also understands that any preliminary diagnostic results, including abnormalities, may not be shared with them, if they choose to leave prior to further evaluation in the main ED.

## 2025-04-16 NOTE — Clinical Note
Sheath was removed in the right radial artery. Site closed by TR-Band. Removed by Lindsey Mason RT; 10ml of air in band

## 2025-04-16 NOTE — Clinical Note
Closure device placed in the right radial artery. Site closed by radial compression system. Deployed By: Lindsey Mason, RT10ml of air in band

## 2025-04-16 NOTE — ED TRIAGE NOTES
Pt coming in for chest pain since Monday. Has hx of 2 heart attacks. Started with heaviness in his left chest. Has a stimulator for his chest pain so says its weird when he has chest pain. Says that it has increased to pain now and now has some numbness and tingling in his finger tips. Says he hasn't had that type of sensation in a couple of years. Says that he told his cardiologist and they told him to come in. Did take 1 nitroglycerin before he came in

## 2025-04-17 ENCOUNTER — SURGERY (OUTPATIENT)
Age: 60
End: 2025-04-17
Payer: COMMERCIAL

## 2025-04-17 VITALS
RESPIRATION RATE: 18 BRPM | HEART RATE: 82 BPM | SYSTOLIC BLOOD PRESSURE: 118 MMHG | TEMPERATURE: 96.3 F | BODY MASS INDEX: 29.55 KG/M2 | HEIGHT: 68 IN | DIASTOLIC BLOOD PRESSURE: 76 MMHG | OXYGEN SATURATION: 97 % | WEIGHT: 195 LBS

## 2025-04-17 PROBLEM — I20.0 UNSTABLE ANGINA (MULTI): Status: RESOLVED | Noted: 2025-04-16 | Resolved: 2025-04-17

## 2025-04-17 PROBLEM — R07.9 CHEST PAIN: Status: RESOLVED | Noted: 2025-04-16 | Resolved: 2025-04-17

## 2025-04-17 PROBLEM — I20.0 UNSTABLE ANGINA (MULTI): Status: ACTIVE | Noted: 2025-04-16

## 2025-04-17 LAB
ALBUMIN SERPL BCP-MCNC: 4.1 G/DL (ref 3.4–5)
ANION GAP SERPL CALC-SCNC: 10 MMOL/L (ref 10–20)
ATRIAL RATE: 105 BPM
BUN SERPL-MCNC: 12 MG/DL (ref 6–23)
CALCIUM SERPL-MCNC: 8.8 MG/DL (ref 8.6–10.3)
CHLORIDE SERPL-SCNC: 109 MMOL/L (ref 98–107)
CO2 SERPL-SCNC: 29 MMOL/L (ref 21–32)
CREAT SERPL-MCNC: 1.25 MG/DL (ref 0.5–1.3)
EGFRCR SERPLBLD CKD-EPI 2021: 67 ML/MIN/1.73M*2
ERYTHROCYTE [DISTWIDTH] IN BLOOD BY AUTOMATED COUNT: 12.5 % (ref 11.5–14.5)
GLUCOSE BLD MANUAL STRIP-MCNC: 97 MG/DL (ref 74–99)
GLUCOSE SERPL-MCNC: 100 MG/DL (ref 74–99)
HCT VFR BLD AUTO: 42.4 % (ref 41–52)
HGB BLD-MCNC: 13.7 G/DL (ref 13.5–17.5)
MAGNESIUM SERPL-MCNC: 2.32 MG/DL (ref 1.6–2.4)
MCH RBC QN AUTO: 30.3 PG (ref 26–34)
MCHC RBC AUTO-ENTMCNC: 32.3 G/DL (ref 32–36)
MCV RBC AUTO: 94 FL (ref 80–100)
NRBC BLD-RTO: 0 /100 WBCS (ref 0–0)
P AXIS: 33 DEGREES
P OFFSET: 204 MS
P ONSET: 151 MS
PHOSPHATE SERPL-MCNC: 3.2 MG/DL (ref 2.5–4.9)
PLATELET # BLD AUTO: 201 X10*3/UL (ref 150–450)
POTASSIUM SERPL-SCNC: 4.5 MMOL/L (ref 3.5–5.3)
PR INTERVAL: 140 MS
Q ONSET: 221 MS
QRS COUNT: 18 BEATS
QRS DURATION: 80 MS
QT INTERVAL: 334 MS
QTC CALCULATION(BAZETT): 441 MS
QTC FREDERICIA: 402 MS
R AXIS: 23 DEGREES
RBC # BLD AUTO: 4.52 X10*6/UL (ref 4.5–5.9)
SODIUM SERPL-SCNC: 143 MMOL/L (ref 136–145)
T AXIS: 62 DEGREES
T OFFSET: 388 MS
VENTRICULAR RATE: 105 BPM
WBC # BLD AUTO: 8 X10*3/UL (ref 4.4–11.3)

## 2025-04-17 PROCEDURE — 96374 THER/PROPH/DIAG INJ IV PUSH: CPT | Mod: 59

## 2025-04-17 PROCEDURE — 2500000004 HC RX 250 GENERAL PHARMACY W/ HCPCS (ALT 636 FOR OP/ED): Mod: JZ | Performed by: INTERNAL MEDICINE

## 2025-04-17 PROCEDURE — 2500000005 HC RX 250 GENERAL PHARMACY W/O HCPCS: Performed by: STUDENT IN AN ORGANIZED HEALTH CARE EDUCATION/TRAINING PROGRAM

## 2025-04-17 PROCEDURE — 93458 L HRT ARTERY/VENTRICLE ANGIO: CPT | Performed by: STUDENT IN AN ORGANIZED HEALTH CARE EDUCATION/TRAINING PROGRAM

## 2025-04-17 PROCEDURE — C1760 CLOSURE DEV, VASC: HCPCS | Performed by: STUDENT IN AN ORGANIZED HEALTH CARE EDUCATION/TRAINING PROGRAM

## 2025-04-17 PROCEDURE — 99152 MOD SED SAME PHYS/QHP 5/>YRS: CPT | Performed by: STUDENT IN AN ORGANIZED HEALTH CARE EDUCATION/TRAINING PROGRAM

## 2025-04-17 PROCEDURE — 99255 IP/OBS CONSLTJ NEW/EST HI 80: CPT | Performed by: STUDENT IN AN ORGANIZED HEALTH CARE EDUCATION/TRAINING PROGRAM

## 2025-04-17 PROCEDURE — C1887 CATHETER, GUIDING: HCPCS | Performed by: STUDENT IN AN ORGANIZED HEALTH CARE EDUCATION/TRAINING PROGRAM

## 2025-04-17 PROCEDURE — 2720000007 HC OR 272 NO HCPCS: Performed by: STUDENT IN AN ORGANIZED HEALTH CARE EDUCATION/TRAINING PROGRAM

## 2025-04-17 PROCEDURE — 99238 HOSP IP/OBS DSCHRG MGMT 30/<: CPT | Performed by: INTERNAL MEDICINE

## 2025-04-17 PROCEDURE — G0378 HOSPITAL OBSERVATION PER HR: HCPCS

## 2025-04-17 PROCEDURE — 36415 COLL VENOUS BLD VENIPUNCTURE: CPT | Performed by: INTERNAL MEDICINE

## 2025-04-17 PROCEDURE — 7100000010 HC PHASE TWO TIME - EACH INCREMENTAL 1 MINUTE: Performed by: STUDENT IN AN ORGANIZED HEALTH CARE EDUCATION/TRAINING PROGRAM

## 2025-04-17 PROCEDURE — 83735 ASSAY OF MAGNESIUM: CPT | Performed by: INTERNAL MEDICINE

## 2025-04-17 PROCEDURE — 2550000001 HC RX 255 CONTRASTS: Performed by: STUDENT IN AN ORGANIZED HEALTH CARE EDUCATION/TRAINING PROGRAM

## 2025-04-17 PROCEDURE — 2500000001 HC RX 250 WO HCPCS SELF ADMINISTERED DRUGS (ALT 637 FOR MEDICARE OP): Performed by: INTERNAL MEDICINE

## 2025-04-17 PROCEDURE — 7100000009 HC PHASE TWO TIME - INITIAL BASE CHARGE: Performed by: STUDENT IN AN ORGANIZED HEALTH CARE EDUCATION/TRAINING PROGRAM

## 2025-04-17 PROCEDURE — C1894 INTRO/SHEATH, NON-LASER: HCPCS | Performed by: STUDENT IN AN ORGANIZED HEALTH CARE EDUCATION/TRAINING PROGRAM

## 2025-04-17 PROCEDURE — 2500000004 HC RX 250 GENERAL PHARMACY W/ HCPCS (ALT 636 FOR OP/ED): Performed by: STUDENT IN AN ORGANIZED HEALTH CARE EDUCATION/TRAINING PROGRAM

## 2025-04-17 PROCEDURE — 96373 THER/PROPH/DIAG INJ IA: CPT | Performed by: STUDENT IN AN ORGANIZED HEALTH CARE EDUCATION/TRAINING PROGRAM

## 2025-04-17 PROCEDURE — 82947 ASSAY GLUCOSE BLOOD QUANT: CPT

## 2025-04-17 PROCEDURE — 85027 COMPLETE CBC AUTOMATED: CPT | Performed by: INTERNAL MEDICINE

## 2025-04-17 PROCEDURE — 80069 RENAL FUNCTION PANEL: CPT | Performed by: INTERNAL MEDICINE

## 2025-04-17 PROCEDURE — 2500000002 HC RX 250 W HCPCS SELF ADMINISTERED DRUGS (ALT 637 FOR MEDICARE OP, ALT 636 FOR OP/ED): Performed by: INTERNAL MEDICINE

## 2025-04-17 RX ORDER — VERAPAMIL HYDROCHLORIDE 2.5 MG/ML
INJECTION INTRAVENOUS AS NEEDED
Status: DISCONTINUED | OUTPATIENT
Start: 2025-04-17 | End: 2025-04-17 | Stop reason: HOSPADM

## 2025-04-17 RX ORDER — IODIXANOL 320 MG/ML
INJECTION, SOLUTION INTRAVASCULAR AS NEEDED
Status: DISCONTINUED | OUTPATIENT
Start: 2025-04-17 | End: 2025-04-17 | Stop reason: HOSPADM

## 2025-04-17 RX ORDER — HEPARIN SODIUM 1000 [USP'U]/ML
INJECTION, SOLUTION INTRAVENOUS; SUBCUTANEOUS AS NEEDED
Status: DISCONTINUED | OUTPATIENT
Start: 2025-04-17 | End: 2025-04-17 | Stop reason: HOSPADM

## 2025-04-17 RX ORDER — SODIUM CHLORIDE 9 MG/ML
INJECTION, SOLUTION INTRAVENOUS CONTINUOUS PRN
Status: DISCONTINUED | OUTPATIENT
Start: 2025-04-17 | End: 2025-04-17 | Stop reason: HOSPADM

## 2025-04-17 RX ORDER — FENTANYL CITRATE 50 UG/ML
INJECTION, SOLUTION INTRAMUSCULAR; INTRAVENOUS AS NEEDED
Status: DISCONTINUED | OUTPATIENT
Start: 2025-04-17 | End: 2025-04-17 | Stop reason: HOSPADM

## 2025-04-17 RX ORDER — MIDAZOLAM HYDROCHLORIDE 1 MG/ML
INJECTION, SOLUTION INTRAMUSCULAR; INTRAVENOUS AS NEEDED
Status: DISCONTINUED | OUTPATIENT
Start: 2025-04-17 | End: 2025-04-17 | Stop reason: HOSPADM

## 2025-04-17 RX ORDER — ALUMINUM HYDROXIDE, MAGNESIUM HYDROXIDE, AND SIMETHICONE 1200; 120; 1200 MG/30ML; MG/30ML; MG/30ML
10 SUSPENSION ORAL ONCE
Status: COMPLETED | OUTPATIENT
Start: 2025-04-17 | End: 2025-04-17

## 2025-04-17 RX ORDER — NITROGLYCERIN 40 MG/100ML
INJECTION INTRAVENOUS AS NEEDED
Status: DISCONTINUED | OUTPATIENT
Start: 2025-04-17 | End: 2025-04-17 | Stop reason: HOSPADM

## 2025-04-17 RX ORDER — ONDANSETRON HYDROCHLORIDE 2 MG/ML
4 INJECTION, SOLUTION INTRAVENOUS EVERY 6 HOURS PRN
Status: DISCONTINUED | OUTPATIENT
Start: 2025-04-17 | End: 2025-04-17 | Stop reason: HOSPADM

## 2025-04-17 RX ORDER — LIDOCAINE HYDROCHLORIDE 20 MG/ML
INJECTION, SOLUTION INFILTRATION; PERINEURAL AS NEEDED
Status: DISCONTINUED | OUTPATIENT
Start: 2025-04-17 | End: 2025-04-17 | Stop reason: HOSPADM

## 2025-04-17 RX ADMIN — PANTOPRAZOLE SODIUM 40 MG: 40 TABLET, DELAYED RELEASE ORAL at 09:30

## 2025-04-17 RX ADMIN — VERAPAMIL HYDROCHLORIDE 2.5 MG: 2.5 INJECTION, SOLUTION INTRAVENOUS at 12:24

## 2025-04-17 RX ADMIN — NITROGLYCERIN 0.4 MG: 0.4 TABLET SUBLINGUAL at 00:58

## 2025-04-17 RX ADMIN — ONDANSETRON 4 MG: 2 INJECTION, SOLUTION INTRAMUSCULAR; INTRAVENOUS at 10:20

## 2025-04-17 RX ADMIN — OXYBUTYNIN CHLORIDE 5 MG: 5 TABLET ORAL at 14:51

## 2025-04-17 RX ADMIN — HEPARIN SODIUM 5000 UNITS: 1000 INJECTION, SOLUTION INTRAVENOUS; SUBCUTANEOUS at 12:23

## 2025-04-17 RX ADMIN — LOSARTAN POTASSIUM 50 MG: 50 TABLET, FILM COATED ORAL at 09:30

## 2025-04-17 RX ADMIN — SERTRALINE HYDROCHLORIDE 50 MG: 50 TABLET ORAL at 09:30

## 2025-04-17 RX ADMIN — PREGABALIN 50 MG: 50 CAPSULE ORAL at 09:30

## 2025-04-17 RX ADMIN — PREGABALIN 50 MG: 50 CAPSULE ORAL at 14:51

## 2025-04-17 RX ADMIN — SODIUM CHLORIDE 30 ML/HR: 900 INJECTION, SOLUTION INTRAVENOUS at 12:08

## 2025-04-17 RX ADMIN — NITROGLYCERIN 200 MCG: 10 INJECTION INTRAVENOUS at 12:24

## 2025-04-17 RX ADMIN — MIDAZOLAM 1 MG: 1 INJECTION INTRAMUSCULAR; INTRAVENOUS at 12:23

## 2025-04-17 RX ADMIN — OXYBUTYNIN CHLORIDE 5 MG: 5 TABLET ORAL at 06:41

## 2025-04-17 RX ADMIN — ATORVASTATIN CALCIUM 80 MG: 80 TABLET, FILM COATED ORAL at 09:29

## 2025-04-17 RX ADMIN — SERTRALINE HYDROCHLORIDE 100 MG: 100 TABLET ORAL at 09:30

## 2025-04-17 RX ADMIN — LIDOCAINE HYDROCHLORIDE 3 ML: 20 INJECTION, SOLUTION INFILTRATION; PERINEURAL at 12:22

## 2025-04-17 RX ADMIN — Medication 3 L/MIN: at 12:06

## 2025-04-17 RX ADMIN — MIDAZOLAM 2 MG: 1 INJECTION INTRAMUSCULAR; INTRAVENOUS at 12:16

## 2025-04-17 RX ADMIN — ASPIRIN 81 MG CHEWABLE TABLET 81 MG: 81 TABLET CHEWABLE at 11:01

## 2025-04-17 RX ADMIN — IODIXANOL 15 ML: 320 INJECTION, SOLUTION INTRAVASCULAR at 12:30

## 2025-04-17 RX ADMIN — CLOPIDOGREL BISULFATE 75 MG: 75 TABLET, FILM COATED ORAL at 11:00

## 2025-04-17 RX ADMIN — FENTANYL CITRATE 50 MCG: 50 INJECTION, SOLUTION INTRAMUSCULAR; INTRAVENOUS at 12:16

## 2025-04-17 RX ADMIN — ALUMINUM HYDROXIDE, MAGNESIUM HYDROXIDE, AND DIMETHICONE 10 ML: 200; 20; 200 SUSPENSION ORAL at 11:00

## 2025-04-17 RX ADMIN — FENTANYL CITRATE 50 MCG: 50 INJECTION, SOLUTION INTRAMUSCULAR; INTRAVENOUS at 12:22

## 2025-04-17 RX ADMIN — HYDROXYZINE HYDROCHLORIDE 25 MG: 25 TABLET, FILM COATED ORAL at 00:58

## 2025-04-17 ASSESSMENT — PAIN SCALES - GENERAL: PAINLEVEL_OUTOF10: 0 - NO PAIN

## 2025-04-17 ASSESSMENT — PAIN SCALES - WONG BAKER: WONGBAKER_NUMERICALRESPONSE: NO HURT

## 2025-04-17 NOTE — NURSING NOTE
Discharge instructions reviewed with patient who voices understanding of all instructions. Discharged to home with all belongings.  Aware that arm board must stay on for 24 hours.   Cath nurse reviewed importance of gentle care of site where cath was done.

## 2025-04-17 NOTE — DISCHARGE SUMMARY
Discharge Diagnosis  Chest pain    Issues Requiring Follow-Up  Follow up with your cardiologist.  No lifting >5 pounds for the next week.    Discharge Meds     Medication List      CHANGE how you take these medications     atorvastatin 80 mg tablet; Commonly known as: Lipitor; Take 1 tablet (80   mg) by mouth once daily.; What changed: when to take this   clopidogrel 75 mg tablet; Commonly known as: Plavix; Take 1 tablet (75   mg) by mouth once daily.; What changed: when to take this   DULoxetine 60 mg DR capsule; Commonly known as: Cymbalta; Take 1 capsule   (60 mg) by mouth once daily.; What changed: when to take this   isosorbide mononitrate ER 30 mg 24 hr tablet; Commonly known as: Imdur;   Take 2 tablets (60 mg) by mouth once daily.; What changed: when to take   this   tamsulosin 0.4 mg 24 hr capsule; Commonly known as: Flomax; Take 1   capsule (0.4 mg) by mouth once daily.; What changed: when to take this   Tiadylt  mg 24 hr capsule; Generic drug: dilTIAZem ER; Take 1   capsule (360 mg) by mouth once daily.; What changed: when to take this   trospium 60 mg 24 hour capsule; Commonly known as: Sanctura XR; Take 1   capsule (60 mg) by mouth once daily.; What changed: when to take this     CONTINUE taking these medications     APPLE CIDER VINEGAR PLUS ORAL   aspirin 325 mg tablet   cetirizine 10 mg tablet; Commonly known as: ZyrTEC; Take 1 tablet (10   mg) by mouth once daily.   cholecalciferol 125 mcg (5,000 units) tablet; Commonly known as: Vitamin   D-3   dapaglifloz propaned-metformin 5-1,000 mg; Commonly known as: Xigduo XR;   Take 1 tablet by mouth once daily. Take with food.   EpiPen 0.3 mg/0.3 mL injection syringe; Generic drug: EPINEPHrine   fish oil concentrate 120-180 mg capsule; Commonly known as: Omega-3   fluticasone 50 mcg/actuation nasal spray; Commonly known as: Flonase;   Administer 1 spray into each nostril once daily. Shake gently. Before   first use, prime pump. After use, clean tip and  replace cap.   hydrOXYzine HCL 25 mg tablet; Commonly known as: Atarax   hydrOXYzine pamoate 50 mg capsule; Commonly known as: Vistaril   ketamine (bulk) 100 % powder; Ketamine 100 mg/mL + lidocaine 40 mg/mL 1   puff 4 times daily as needed, DSP#20 mL x 5 refills   lidocaine HCl (bulk) 100 % powder powder   losartan 50 mg tablet; Commonly known as: Cozaar; Take 1 tablet (50 mg)   by mouth once daily.   Mounjaro 7.5 mg/0.5 mL pen injector; Generic drug: tirzepatide; Inject   7.5 mg under the skin every 7 days.   nitroglycerin 0.4 mg SL tablet; Commonly known as: Nitrostat   NON FORMULARY   NON FORMULARY   pantoprazole 40 mg EC tablet; Commonly known as: ProtoNix; Take 1 tablet   (40 mg) by mouth once daily. Do not crush, chew, or split.   pregabalin 50 mg capsule; Commonly known as: Lyrica; TAKE 1 CAPSULE (50   MG) BY MOUTH 3 TIMES A DAY.   promethazine 25 mg tablet; Commonly known as: Phenergan; Take 1 tablet   (25 mg) by mouth every 6 hours if needed for nausea.   rOPINIRole 1 mg tablet; Commonly known as: Requip; TAKE 1 TABLET (1 MG)   BY MOUTH ONCE DAILY AT BEDTIME.   * sertraline 50 mg tablet; Commonly known as: Zoloft   * sertraline 100 mg tablet; Commonly known as: Zoloft   tiZANidine 4 mg tablet; Commonly known as: Zanaflex; TAKE 1 TABLET (4   MG) BY MOUTH 3 TIMES A DAY AS NEEDED FOR MUSCLE SPASMS.   traZODone 100 mg tablet; Commonly known as: Desyrel; Take 1 tablet (100   mg) by mouth once daily at bedtime.   VITAMIN B COMPLEX ORAL  * This list has 2 medication(s) that are the same as other medications   prescribed for you. Read the directions carefully, and ask your doctor or   other care provider to review them with you.     ASK your doctor about these medications     Myrbetriq 50 mg tablet extended release 24 hr 24 hr tablet; Generic   drug: mirabegron; Take 1 tablet (50 mg) by mouth once daily.   oxyCODONE-acetaminophen 5-325 mg tablet; Commonly known as: Percocet;   Take 1 tablet by mouth every 4 hours  if needed for severe pain (7 - 10).       Test Results Pending At Discharge  Pending Labs       No current pending labs.            Hospital Course   Logan Campos is a 59 y.o. male with a PMHx of HTN, HLD, CAD s/p 2 stents, DM, depression/anxiety, GERD, carpal tunnel, chronic chest pain was pain stimulator who presented to [facility] on 4/16/2025 with a chief complaint of chest pain.  Patient is accompanied by his wife at bedside.  He described the pain as heaviness, that was intermittent, started on Monday and has been becoming progressively worse that was accompanied by fatigue.  He stated that he has chronic chest pain, which is controlled by his pain stimulator but this was more than usual.  His pain was also radiating to his left hand and jaw.  Patient was also having nausea but no vomiting, no shortness of breath, the pain was not changed by position, pain was mainly located in the left chest and radiates to the jaw and left hand.  He rated the pain as 7/10 in severity.  He was not able to tell if there was any accompanied lightheadedness.  He denied having any recent cough, fever or chills.  He took about 5 nitroglycerin on the day of admission for the pain. he some headache after taking nitro.  He lost about about 100 pounds since 2021, that was intentional.  He denies any change in vision, difficulty swallowing, change in hearing, fever/chills, palpitation,  abdominal pain,   change in bowel habits/urine, joint pain/swelling, weakness in any of the extremities or swelling, insomnia or any symptoms of depression.  He lives with his wife, and feels safe at home.   Patient was admitted, cardiology was consulted and he was taken to the cath, considering his unstable angina episode for which he was very concerned,  Cardiology discussed with him the option of proceeding with left heart catheterization and coronary angiogram with possible revascularization for more definitive assessment of the coronaries and possible  PCI.  He agreed to undergo the procedure.   Cardiology  performed a left heart catheter and coronary angiogram for him to right radial approach. He was found to have patent prior stents with no residual obstructive coronary artery disease. Appropriate to continue current medications from cardiac standpoint. Okay to discharge later in the afternoon after recovery from the heart catheterization. Follow-up with his cardiologist in 2 weeks for further care.  Pt is stable and medically optimized for discharge and was advised to follow up with PCP and continue home meds. Discussed diagnosis and treatment in detail and answered all questions. Patient expressed understanding and agreed with plan.         Pertinent Physical Exam At Time of Discharge  Physical Exam  Constitutional:       Appearance: Normal appearance. He is normal weight.   HENT:      Head: Normocephalic and atraumatic.      Right Ear: Tympanic membrane normal.      Left Ear: Tympanic membrane normal.      Nose: Nose normal.      Mouth/Throat:      Mouth: Mucous membranes are moist.      Pharynx: Oropharynx is clear.   Eyes:      General: No scleral icterus.        Right eye: No discharge.         Left eye: No discharge.      Extraocular Movements: Extraocular movements intact.      Conjunctiva/sclera: Conjunctivae normal.      Pupils: Pupils are equal, round, and reactive to light.   Neck:      Vascular: No carotid bruit.   Cardiovascular:      Rate and Rhythm: Normal rate and regular rhythm.      Pulses: Normal pulses.      Heart sounds: No murmur heard.     No friction rub. No gallop.   Pulmonary:      Effort: Pulmonary effort is normal.      Breath sounds: Normal breath sounds.   Abdominal:      General: Bowel sounds are normal. There is no distension.      Palpations: Abdomen is soft. There is no mass.      Tenderness: There is no abdominal tenderness. There is no right CVA tenderness, left CVA tenderness, guarding or rebound.      Hernia: No hernia is  present.   Musculoskeletal:         General: No swelling, tenderness, deformity or signs of injury.      Cervical back: Normal range of motion and neck supple. No rigidity or tenderness.      Right lower leg: No edema.      Left lower leg: No edema.   Lymphadenopathy:      Cervical: No cervical adenopathy.   Skin:     General: Skin is warm.      Coloration: Skin is not jaundiced or pale.      Findings: No bruising, erythema, lesion or rash.   Neurological:      General: No focal deficit present.      Mental Status: He is alert and oriented to person, place, and time. Mental status is at baseline.   Psychiatric:         Mood and Affect: Mood normal.         Behavior: Behavior normal.         Thought Content: Thought content normal.         Judgment: Judgment normal.         Outpatient Follow-Up  Future Appointments   Date Time Provider Department Center   4/25/2025  2:00 PM AMOS Shelton PAROPCPNM [place]   5/2/2025  9:30 AM INF 02 PARM OPCTR PAROPCINF [place]   5/28/2025  3:00 PM Devin Powell [id number] [facility]   5/28/2025  3:30 PM Robert Aj MD [id number] [facility]   6/6/2025  9:30 AM INF 02 PARM OPCTR PAROPCINF [place]   6/18/2025  2:00 PM AMOS Schreiber [id number] [place]   7/21/2025  9:00 AM Magda Gilman DO [id number] [place]   11/5/2025 11:30 AM Earnest Ambrose, PharmD [id number] [facility]   12/8/2025 10:00 AM Eric Preston MD [id number] [facility] spent <30 minutes in discharge planning and coordination of the patient.     Wil Patrick DO

## 2025-04-17 NOTE — CARE PLAN
The patient's goals for the shift include prevent further injury    The clinical goals for the shift include safety    Over the shift, the patient did not make progress toward the following goals. Barriers to progression include chest tightness. Recommendations to address these barriers include monitoring.

## 2025-04-17 NOTE — POST-PROCEDURE NOTE
Physician Transition of Care Summary  Invasive Cardiovascular Lab    Procedure Date: 4/17/2025  Attending:    * Alberto Barnett - Primary  Complications:  No in-lab complications observed.     Cardiac Cath Post Procedure Notes:  Post Procedure Diagnosis: Patent prior stents.  Blood Loss:               Estimated blood loss during the procedure was 5 mls.  Specimens Removed:        Number of specimen(s) removed: none.        Recommendations:  Maximize medical therapy.  Agressive risk factor modification efforts.  Follow-up with cardiology clinic.  Lipid lowering agent or Statin therapy.  Medical management of coronary artery disease.  Aspirin therapy.     ____________________________________________________________________________________  CONCLUSIONS:   1. Short left main without significant stenosis            Patent stents in proximal and mid LAD and diagonal branch and luminal irregularities in the rest of the vessel            Luminal irregularities throughout left circumflex system            Irregularities throughout RCA            Right dominant coronary artery system            LVEDP 10 mmHg without significant gradient across aortic valve.    Please refer to the full report that is in the system.    Electronically signed by: Alberto Barnett MD PhD, 4/17/2025 12:44 PM

## 2025-04-17 NOTE — CONSULTS
Inpatient consult to Cardiology  Consult performed by: Alberto Barnett MD PhD  Consult ordered by: Wil Patrick DO  Reason for consult: Chest pain        History Of Present Illness:    Logan Campos is a 59 y.o. male with a PMHx of HTN, HLD, CAD s/p 2 stents, DM, depression/anxiety, GERD, carpal tunnel, chronic chest pain was pain stimulator who presented to [facility] on 4/16/2025 with a chief complaint of chest pain.  Patient is accompanied by his wife at bedside.  He described the pain as heaviness, that was intermittent, started on Monday and has been becoming progressively worse that was accompanied by fatigue.  He stated that he has chronic chest pain, which is controlled by his pain stimulator but this was more than usual.  His pain was also radiating to his left hand and jaw.  Patient was also having nausea but no vomiting, no shortness of breath, the pain was not changed by position, pain was mainly located in the left chest and radiates to the jaw and left hand.  He rated the pain as 7/10 in severity.  He was not able to tell if there was any accompanied lightheadedness.  He denied having any recent cough, fever or chills.  He took about 5 nitroglycerin on the day of admission for the pain. he some headache after taking nitro.  He lost about about 100 pounds since 2021, that was intentional.  He denies any change in vision, difficulty swallowing, change in hearing, fever/chills, palpitation,  abdominal pain,   change in bowel habits/urine, joint pain/swelling, weakness in any of the extremities or swelling, insomnia or any symptoms of depression.  He lives with his wife, and feels safe at home.     12 point review of systems including (Constitutional, Eyes, ENMT, Respiratory, Cardiac, Gastrointestinal, Neurological, Psychiatric, and Hematologic) was performed and is otherwise negative.    Past medical history:  As above.    Medications were reviewed.    Allergies were reviewed.    Social history:   Patient denies smoking, alcohol abuse, or illicit drug use.    Family history: Was reviewed and not contributory to the current presentation of the patient.       Last Recorded Vitals:  Vitals:    04/16/25 2000 04/16/25 2209 04/17/25 0058 04/17/25 0556   BP: 116/86 128/87 124/80 118/76   BP Location: Right arm Left arm  Left arm   Patient Position: Sitting Sitting  Sitting   Pulse: 81 78 80 82   Resp: 20 20  18   Temp: 36.5 °C (97.7 °F) 36 °C (96.8 °F)  35.7 °C (96.3 °F)   TempSrc: Temporal Temporal  Temporal   SpO2: 96% 96%  97%   Weight:       Height:           Last Labs:  CBC - 4/17/2025:  7:15 AM  8.0 13.7 201    42.4      CMP - 4/17/2025:  7:15 AM  8.8 6.9 24 --- 0.4   3.2 4.1 15 68      PTT - No results in last year.  _   _ _     Troponin I, High Sensitivity   Date/Time Value Ref Range Status   04/16/2025 10:27 PM 3 0 - 20 ng/L Final   04/16/2025 05:11 PM <3 0 - 20 ng/L Final   04/16/2025 04:20 PM 3 0 - 20 ng/L Final     BNP   Date/Time Value Ref Range Status   04/16/2025 04:20 PM 56 0 - 99 pg/mL Final     POC HEMOGLOBIN A1c   Date/Time Value Ref Range Status   06/24/2024 12:33 PM 5.6 4.2 - 6.5 % Final     Hemoglobin A1C   Date/Time Value Ref Range Status   10/28/2024 12:21 PM 5.3 See comment % Final   03/21/2024 08:58 AM 6.3 (H) see below % Final     LDL-CHOLESTEROL   Date/Time Value Ref Range Status   03/17/2025 08:52 AM 47 mg/dL (calc) Final     Comment:     Reference range: <100     Desirable range <100 mg/dL for primary prevention;    <70 mg/dL for patients with CHD or diabetic patients   with > or = 2 CHD risk factors.     LDL-C is now calculated using the Huang   calculation, which is a validated novel method providing   better accuracy than the Friedewald equation in the   estimation of LDL-C.   Ottoniel KAMARA et al. NYA. 2013;310(19): 2607-8887   (http://education.Cloudmark.Navita/faq/JQR885)       LDL Calculated   Date/Time Value Ref Range Status   05/14/2024 07:33 AM 39 <=99 mg/dL Final      "Comment:                                 Near   Borderline      AGE      Desirable  Optimal    High     High     Very High     0-19 Y     0 - 109     ---    110-129   >/= 130     ----    20-24 Y     0 - 119     ---    120-159   >/= 160     ----      >24 Y     0 -  99   100-129  130-159   160-189     >/=190     03/21/2024 08:58 AM 53 <=99 mg/dL Final     Comment:                                 Near   Borderline      AGE      Desirable  Optimal    High     High     Very High     0-19 Y     0 - 109     ---    110-129   >/= 130     ----    20-24 Y     0 - 119     ---    120-159   >/= 160     ----      >24 Y     0 -  99   100-129  130-159   160-189     >/=190     10/12/2023 09:46 AM 58 <=99 mg/dL Final     Comment:                                 Near   Borderline      AGE      Desirable  Optimal    High     High     Very High     0-19 Y     0 - 109     ---    110-129   >/= 130     ----    20-24 Y     0 - 119     ---    120-159   >/= 160     ----      >24 Y     0 -  99   100-129  130-159   160-189     >/=190       VLDL   Date/Time Value Ref Range Status   05/14/2024 07:33 AM 25 0 - 40 mg/dL Final   03/21/2024 08:58 AM 25 0 - 40 mg/dL Final   10/12/2023 09:46 AM 35 0 - 40 mg/dL Final      Last I/O:  No intake/output data recorded.    Past Cardiology Tests (Last 3 Years):  EKG:  ECG 12 Lead 04/16/2025      ECG 12 lead 01/09/2024    Echo:  Transthoracic Echo (TTE) Complete 10/31/2023    Ejection Fractions:  No results found for: \"EF\"  Cath:  No results found for this or any previous visit from the past 1095 days.    Stress Test:  Nuclear Stress Test 01/18/2023    Cardiac Imaging:  No results found for this or any previous visit from the past 1095 days.      Past Medical History:  He has a past medical history of Anxiety, Arthritis, Coronary artery disease, Depression, Diabetes mellitus (Multi), Diverticulosis, Dizziness (12/25/2024), Ear pain (2024), Hiatal hernia, HL (hearing loss) (12/25/2024), Hypertension, Lumbar disc " disease, Myocardial infarction (Multi), Personal history of other diseases of the circulatory system, Personal history of other diseases of the digestive system, Personal history of other diseases of the nervous system and sense organs, Pure hypercholesterolemia, unspecified, Tinnitus (1992), and Vision loss.    Past Surgical History:  He has a past surgical history that includes Back surgery (12/19/2014); Hernia repair (12/19/2014); Other surgical history (12/19/2014); Tonsillectomy (12/19/2014); Vasectomy (12/19/2014); Cardiac catheterization; Coronary stent placement; Colonoscopy; Meniscectomy; and Adenoidectomy (1973).      Social History:  He reports that he has never smoked. He has never been exposed to tobacco smoke. He has never used smokeless tobacco. He reports current alcohol use of about 3.0 standard drinks of alcohol per week. He reports current drug use. Frequency: 7.00 times per week. Drug: Marijuana.    Family History:  Family History[1]     Allergies:  Cat dander, Onion, and Hydromorphone    Inpatient Medications:  Scheduled Medications[2]  PRN Medications[3]  Continuous Medications[4]  Outpatient Medications:  Current Outpatient Medications   Medication Instructions    aspirin 325 mg tablet 1 tablet, oral, Nightly    atorvastatin (LIPITOR) 80 mg, oral, Daily    cetirizine (ZYRTEC) 10 mg, oral, Daily    cholecalciferol (Vitamin D-3) 5,000 Units tablet 1 tablet, oral, Daily    chrm/vineg/bit-orang peel/gr t (APPLE CIDER VINEGAR PLUS ORAL) 2 capsules, oral, Daily    clopidogrel (PLAVIX) 75 mg, oral, Daily    dapaglifloz propaned-metformin (Xigduo XR) 5-1,000 mg 1 tablet, oral, Daily, Take with food.    DULoxetine (CYMBALTA) 60 mg, oral, Daily    EPINEPHrine (EpiPen) 0.3 mg/0.3 mL injection syringe 0.3 mL, intramuscular, Once as needed, Inject into upper leg. Call 911 after use.    fish oil concentrate (Omega-3) 120-180 mg capsule 6,000 mg, oral, Every morning    fluticasone (Flonase) 50 mcg/actuation  nasal spray 1 spray, Each Nostril, Daily, Shake gently. Before first use, prime pump. After use, clean tip and replace cap.    hydrOXYzine HCL (ATARAX) 25-50 mg, oral, Nightly PRN    hydrOXYzine pamoate (VISTARIL) 50 mg, oral, Once    isosorbide mononitrate ER (IMDUR) 60 mg, oral, Daily    ketamine HCl (ketamine, bulk,) 100 % powder Ketamine 100 mg/mL + lidocaine 40 mg/mL 1 puff 4 times daily as needed, DSP#20 mL x 5 refills    lidocaine HCl, bulk, 100 % powder powder In ketamine nasal spray    losartan (COZAAR) 50 mg, oral, Daily    Mounjaro 7.5 mg, subcutaneous, Every 7 days    Myrbetriq 50 mg, oral, Daily    nitroglycerin (NITROSTAT) 0.4 mg, sublingual, Every 5 min PRN    NON FORMULARY Alive veggie based vitamins    NON FORMULARY Medical Marijuana    oxyCODONE-acetaminophen (Percocet) 5-325 mg tablet 1 tablet, oral, Every 4 hours PRN    pantoprazole (PROTONIX) 40 mg, oral, Daily, Do not crush, chew, or split.    pregabalin (LYRICA) 50 mg, oral, 3 times daily    promethazine (PHENERGAN) 25 mg, oral, Every 6 hours PRN    rOPINIRole (REQUIP) 1 mg, oral, Nightly    sertraline (ZOLOFT) 50 mg, oral, Every morning, Take with 100 mg tab for total daily dose 150 mg    sertraline (ZOLOFT) 100 mg, oral, Every morning, Take with 50 mg tab for total daily dose 150 mg    tamsulosin (FLOMAX) 0.4 mg, oral, Daily    Tiadylt  mg, oral, Daily    tiZANidine (ZANAFLEX) 4 mg, oral, 3 times daily PRN    traZODone (DESYREL) 100 mg, oral, Nightly    trospium (SANCTURA XR) 60 mg, oral, Daily    VITAMIN B COMPLEX ORAL 1 tablet, oral, Every morning       Physical Exam:  General: Awake, alert/oriented x3, well developed, no acute distress  Head: Atraumatic/Normocephalic  Eyes: Normal external exam, EOMI, PERRLA  ENT: Oropharynx normal, moist mucous membranes  Cardiovascular: RRR, S1/S2, no murmurs, rubs, or gallops, radial pulses +2, no edema of extremities  Pulmonary: CTAB, no respiratory distress. No wheezes, rales, or  patricia  Abdomen: +BS, soft, non-tender, nondistended, no guarding or rebound  MSK: No joint swelling, normal movements of all extremities. Range of motion- normal.  Extremities: no edema, no cyanosis  Neuro: Alert/oriented x3, no focal motor or sensory deficits  Psychiatric: Judgment intact. Appropriate mood and behavior     Assessment/Plan   Unstable angina  History of CAD with multiple prior stents  Hypertension  Diabetes type 2  GERD  Depression anxiety    Troponin repeats have been negative.  Considering the patient's history of multiple coronary stents and now his unstable angina episode for which she is very concerned, I discussed with him the option of proceeding with left heart catheterization and coronary angiogram with possible revascularization for more definitive assessment of the coronaries and possible PCI.  He agreed to undergo the procedure.  We performed a left heart catheter and coronary angiogram for him to right radial approach.  He was found to have patent prior stents with no residual obstructive coronary artery disease.  Appropriate to continue current medications from cardiac standpoint.  Okay to discharge later in the afternoon after recovery from the heart catheterization.  Follow-up with his cardiologist in 2 weeks for further care.  Primary team was updated.    Alberto Barnett MD, PhD, FACC, [facility]  Interventional Cardiology, [facility]  Associate Professor of Medicine, [facility]   Office: [phone]              [1]   Family History  Problem Relation Name Age of Onset    Stroke Father Ben Campos Sr     Hearing loss Father Ben Campos Sr     Breast cancer Sister      Stomach cancer Maternal Grandmother Sade Corbin     Cancer Maternal Grandmother Sade Corbin     Cancer Sister Shemar Neotsu     Cancer Sister Karol Andres    [2]   Scheduled medications   Medication Dose Route Frequency    aspirin  81 mg oral Daily    atorvastatin  80  mg oral q AM    clopidogrel  75 mg oral Nightly    dilTIAZem ER  360 mg oral Nightly    DULoxetine  60 mg oral Nightly    [Held by provider] enoxaparin  40 mg subcutaneous q24h    insulin lispro  0-10 Units subcutaneous TID AC    isosorbide mononitrate ER  60 mg oral Nightly    losartan  50 mg oral Daily    oxybutynin  5 mg oral 4x daily    pantoprazole  40 mg oral Daily    pregabalin  50 mg oral TID    rOPINIRole  1 mg oral Nightly    sertraline  100 mg oral q AM    sertraline  50 mg oral q AM    tamsulosin  0.4 mg oral Nightly    traZODone  100 mg oral Nightly   [3]   PRN medications   Medication    EPINEPHrine    fentaNYL PF    heparin    hydrOXYzine HCL    iodixanol    lidocaine    midazolam    nitroglycerin    nitroglycerin    ondansetron    oxyCODONE-acetaminophen    oxygen    polyethylene glycol    sodium chloride 0.9%    tiZANidine    verapamil   [4]   Continuous Medications   Medication Dose Last Rate    oxygen   Stopped (04/17/25 1232)    sodium chloride 0.9%   Stopped (04/17/25 1234)

## 2025-04-17 NOTE — PROGRESS NOTES
25 [id number]   Discharge Planning   Living Arrangements Spouse/significant other   Support Systems Spouse/significant other   Assistance Needed None   Type of Residence Private residence   Home or Post Acute Services None   Expected Discharge Disposition Home   Does the patient need discharge transport arranged? No     TCC Note: : Met with pt. at bedside, introduced self and role on the Transition of Care Team. Verified name, , demographics, insurance, PCP is Magda Gilman. Emergency contact is wifePaulo Phone: [phone]. Pt. lives with wife in a home with 0 SONJA.  Pt. is Independent with ADLs. Pt. Had recent fall in December due to a balance issue with his ear drum being non functioning. Pt does not use an assistive device for ambulation. Pt. is Type 2 diabetic and does not use any home O2 or any other home services/supplies. Preferred pharmacy is [facility] in [facility] in [place].  No problems affording or obtaining medications. Pt. states that he has no discharge needs at this time. Transitions of Care will continue to follow until discharge. Negrita Major, MSN, RN,

## 2025-04-17 NOTE — CARE PLAN
The patient's goals for the shift include relief from chest pain.    The clinical goals for the shift include rest and comfort.

## 2025-04-18 ENCOUNTER — PATIENT OUTREACH (OUTPATIENT)
Dept: CARE COORDINATION | Facility: CLINIC | Age: 60
End: 2025-04-18
Payer: COMMERCIAL

## 2025-04-18 NOTE — PROGRESS NOTES
Wrap Up  Wrap Up Additional Comments: Logan reports he is status quo, always has chest pain.  Also goes for ketamine infusions for cervical disc dz and chronic pain from that.  Is enrolled in [facility].  Has multiple appts scheduled.  No new scripts (4/18/2025 11:38 AM)    Medications  Medications reviewed with patient/caregiver?: Yes (4/18/2025 11:38 AM)  Is the patient having any side effects they believe may be caused by any medication additions or changes?: No (4/18/2025 11:38 AM)  Does the patient have all medications ordered at discharge?: Yes (4/18/2025 11:38 AM)  Care Management Interventions: No intervention needed (4/18/2025 11:38 AM)    Appointments  Does the patient have a primary care provider?: Yes (4/18/2025 11:38 AM)  Care Management Interventions: Verified appointment date/time/provider (4/18/2025 11:38 AM)  Has the patient kept scheduled appointments due by today?: Not applicable (4/18/2025 11:38 AM)    Patient Teaching  Does the patient have access to their discharge instructions?: Yes (4/18/2025 11:38 AM)  Care Management Interventions: Reviewed instructions with patient (4/18/2025 11:38 AM)  What is the patient's perception of their health status since discharge?: Returned to baseline/stable (4/18/2025 11:38 AM)      Shira Herring RN [facility]  [phone]

## 2025-04-29 DIAGNOSIS — Z96.89 SPINAL CORD STIMULATOR STATUS: ICD-10-CM

## 2025-04-29 DIAGNOSIS — M54.42 CHRONIC BILATERAL LOW BACK PAIN WITH LEFT-SIDED SCIATICA: ICD-10-CM

## 2025-04-29 DIAGNOSIS — G89.0 CENTRAL PAIN SYNDROME: Primary | ICD-10-CM

## 2025-04-29 DIAGNOSIS — G89.29 CHRONIC BILATERAL LOW BACK PAIN WITH LEFT-SIDED SCIATICA: ICD-10-CM

## 2025-04-29 RX ORDER — KETOROLAC TROMETHAMINE 30 MG/ML
30 INJECTION, SOLUTION INTRAMUSCULAR; INTRAVENOUS ONCE
Status: CANCELLED | OUTPATIENT
Start: 2025-05-02 | End: 2025-05-02

## 2025-05-02 ENCOUNTER — INFUSION (OUTPATIENT)
Dept: INFUSION THERAPY | Facility: CLINIC | Age: 60
End: 2025-05-02
Payer: COMMERCIAL

## 2025-05-02 ENCOUNTER — PATIENT OUTREACH (OUTPATIENT)
Dept: CARE COORDINATION | Facility: CLINIC | Age: 60
End: 2025-05-02

## 2025-05-02 VITALS
OXYGEN SATURATION: 95 % | DIASTOLIC BLOOD PRESSURE: 72 MMHG | TEMPERATURE: 97.5 F | RESPIRATION RATE: 17 BRPM | SYSTOLIC BLOOD PRESSURE: 118 MMHG | HEART RATE: 84 BPM

## 2025-05-02 DIAGNOSIS — M54.42 CHRONIC BILATERAL LOW BACK PAIN WITH LEFT-SIDED SCIATICA: ICD-10-CM

## 2025-05-02 DIAGNOSIS — G89.29 CHRONIC BILATERAL LOW BACK PAIN WITH LEFT-SIDED SCIATICA: ICD-10-CM

## 2025-05-02 DIAGNOSIS — G89.0 CENTRAL PAIN SYNDROME: ICD-10-CM

## 2025-05-02 DIAGNOSIS — Z96.89 SPINAL CORD STIMULATOR STATUS: ICD-10-CM

## 2025-05-02 PROCEDURE — 96368 THER/DIAG CONCURRENT INF: CPT | Mod: INF

## 2025-05-02 PROCEDURE — 96365 THER/PROPH/DIAG IV INF INIT: CPT | Mod: INF

## 2025-05-02 PROCEDURE — 96375 TX/PRO/DX INJ NEW DRUG ADDON: CPT | Mod: INF

## 2025-05-02 PROCEDURE — 2500000004 HC RX 250 GENERAL PHARMACY W/ HCPCS (ALT 636 FOR OP/ED): Mod: JZ | Performed by: NURSE PRACTITIONER

## 2025-05-02 RX ORDER — FAMOTIDINE 10 MG/ML
20 INJECTION, SOLUTION INTRAVENOUS ONCE AS NEEDED
OUTPATIENT
Start: 2025-06-01

## 2025-05-02 RX ORDER — KETOROLAC TROMETHAMINE 30 MG/ML
30 INJECTION, SOLUTION INTRAMUSCULAR; INTRAVENOUS ONCE
OUTPATIENT
Start: 2025-06-01 | End: 2025-06-01

## 2025-05-02 RX ORDER — NITROGLYCERIN 0.4 MG/1
0.4 TABLET SUBLINGUAL ONCE
OUTPATIENT
Start: 2025-06-01 | End: 2025-06-01

## 2025-05-02 RX ORDER — METOPROLOL TARTRATE 25 MG/1
25 TABLET, FILM COATED ORAL ONCE
OUTPATIENT
Start: 2025-06-01 | End: 2025-06-01

## 2025-05-02 RX ORDER — KETOROLAC TROMETHAMINE 30 MG/ML
30 INJECTION, SOLUTION INTRAMUSCULAR; INTRAVENOUS ONCE
Status: COMPLETED | OUTPATIENT
Start: 2025-05-02 | End: 2025-05-02

## 2025-05-02 RX ORDER — EPINEPHRINE 0.3 MG/.3ML
0.3 INJECTION SUBCUTANEOUS EVERY 5 MIN PRN
OUTPATIENT
Start: 2025-06-01

## 2025-05-02 RX ORDER — ALBUTEROL SULFATE 0.83 MG/ML
3 SOLUTION RESPIRATORY (INHALATION) AS NEEDED
OUTPATIENT
Start: 2025-06-01

## 2025-05-02 RX ORDER — DIPHENHYDRAMINE HYDROCHLORIDE 50 MG/ML
50 INJECTION, SOLUTION INTRAMUSCULAR; INTRAVENOUS AS NEEDED
OUTPATIENT
Start: 2025-06-01

## 2025-05-02 RX ORDER — DIPHENHYDRAMINE HYDROCHLORIDE 50 MG/ML
25 INJECTION, SOLUTION INTRAMUSCULAR; INTRAVENOUS ONCE
OUTPATIENT
Start: 2025-06-01 | End: 2025-06-01

## 2025-05-02 RX ORDER — ONDANSETRON HYDROCHLORIDE 2 MG/ML
4 INJECTION, SOLUTION INTRAVENOUS ONCE
OUTPATIENT
Start: 2025-06-01 | End: 2025-06-01

## 2025-05-02 RX ORDER — METOCLOPRAMIDE HYDROCHLORIDE 5 MG/ML
10 INJECTION INTRAMUSCULAR; INTRAVENOUS ONCE
OUTPATIENT
Start: 2025-06-01 | End: 2025-06-01

## 2025-05-02 RX ADMIN — KETOROLAC TROMETHAMINE 30 MG: 30 INJECTION, SOLUTION INTRAMUSCULAR at 09:34

## 2025-05-02 RX ADMIN — Medication: at 09:35

## 2025-05-02 RX ADMIN — PROPOFOL 100 MG: 10 INJECTION, EMULSION INTRAVENOUS at 09:34

## 2025-05-02 ASSESSMENT — ENCOUNTER SYMPTOMS
OCCASIONAL FEELINGS OF UNSTEADINESS: 0
DEPRESSION: 0
LOSS OF SENSATION IN FEET: 1

## 2025-05-02 ASSESSMENT — PAIN SCALES - GENERAL
PAINLEVEL_OUTOF10: 0 - NO PAIN
PAINLEVEL_OUTOF10: 7

## 2025-05-02 NOTE — PATIENT INSTRUCTIONS
Today :We administered (ketamine 30 mg, lidocaine (Xylocaine) 300 mg in sodium chloride 0.9% 518 mL IV), propofol (Diprivan) 100 mg in dextrose 5% 25 mL IV, and ketorolac.     For:   1. Central pain syndrome    2. Chronic bilateral low back pain with left-sided sciatica    3. Spinal cord stimulator status       (Tell all doctors including dentists that you are taking this medication)     Go to the emergency room or call 911 if:  -You have signs of allergic reaction:   -Rash, hives, itching.   -Swollen, blistered, peeling skin.   -Swelling of face, lips, mouth, tongue or throat.   -Tightness of chest, trouble breathing, swallowing or talking     Call your doctor:  - If IV / injection site gets red, warm, swollen, itchy or leaks fluid or pus.     (Leave dressing on your IV site for at least 2 hours and keep area clean and dry  - If you get sick or have symptoms of infection or are not feeling well for any reason.    (Wash your hands often, stay away from people who are sick)  - If you have side effects from your medication that do not go away or are bothersome.     (Refer to the teaching your nurse gave you for side effects to call your doctor about)    - Common side effects may include:  stuffy nose, headache, feeling tired, muscle aches, upset stomach  - Before receiving any vaccines     - Call [facility] at   If:  - You get sick, are on antibiotics, have had a recent vaccine, have surgery or dental work and your doctor wants your visit rescheduled.  - You need to cancel and reschedule your visit for any reason. Call at least 2 days before your visit if you need to cancel.   - Your insurance changes before your next visit.    (We will need to get approval from your new insurance. This can take up to two weeks.)     The [facility] is opened Monday thru Friday. We are closed on weekends and holidays.   Voice mail will take your call if the center is closed. If you leave a message please allow  24 hours for a call back during weekdays. If you leave a message on a weekend/holiday, we will call you back the next business day.    A pharmacist is available Monday - Friday from 8:30AM to 3:30PM to help answer any questions you may have about your prescriptions(s). Please call pharmacy at:    [facility]: [phone]  [facility]: [phone]  [facility]: [phone]              [facility] OUTPATIENT [place]      Pain Infusion Aftercare Instructions      1. It is normal to feel sedated, tired and low in energy after a pain infusion. DO NOT DRIVE, OPERATE ANY MACHINERY, OR MAKE ANY IMPORTANT DECISIONS FOR AT LEAST 24 HOURS AFTER THE INFUSION.     2. Call the pain center at [phone] with any problems, questions, or concerns.     3. Eat light after the infusion. If you feel queasy or sick to your stomach, laying down with your eyes closed may help. When you resume eating start with something mild like clear liquids, yogurt, applesauce, crackers, etc… Gradually advance to a regular diet.     4. Do not leave your house alone the evening of your pain infusion.     5. No alcohol or sedative medications, such as sleeping pills, for 24 hours after your pain infusion.     6. Resume all other prescribed medications unless directed otherwise by you physician.     7. If you have any medical emergencies, call 911 or go directly to the closest emergency room.

## 2025-05-02 NOTE — PROGRESS NOTES
S: Patient here for 23rd opioid sparing pain infusion. Patient reports 75% reduction in pain after last infusion that lasted 16 days.    Purpose of pain infusion meds explained along with potential side effects.  Patient verbalized understanding.    B: Pain Issues. Low back    Is Patient breast feeding: n/a      Is Patient receiving any other form Ketamine from any other facility/ outside clinic ?: no  A: Patient currently has pain described on flow sheet documentation. Designated  is [email or web address] [phone]. Patient last ate solid food >12 hours ago, and had liquid 1 hours ago.    R: Plan; Obtain IV access, do patient risk assessment, and start opioid sparing infusion as ordered. Monitoring for S/S of adverse reactions.    Post infusion teaching provided. Patient verbalized understanding. VSS, Patient states pain is 0/10. Will assist patient to waiting car via wheelchair.

## 2025-05-02 NOTE — PROGRESS NOTES
[facility] follow up:  Logan reports he's doing fine, has all of his meds and appts.  Denies needs.  Will close the HILARY program    Shira Herring RN [facility]  [phone]

## 2025-05-09 PROCEDURE — RXMED WILLOW AMBULATORY MEDICATION CHARGE

## 2025-05-12 ENCOUNTER — PHARMACY VISIT (OUTPATIENT)
Dept: PHARMACY | Facility: CLINIC | Age: 60
End: 2025-05-12
Payer: COMMERCIAL

## 2025-05-15 DIAGNOSIS — G89.4 CHRONIC PAIN SYNDROME: ICD-10-CM

## 2025-05-20 ENCOUNTER — TELEPHONE (OUTPATIENT)
Dept: PAIN MEDICINE | Facility: CLINIC | Age: 60
End: 2025-05-20
Payer: COMMERCIAL

## 2025-05-20 DIAGNOSIS — R11.0 NAUSEA: ICD-10-CM

## 2025-05-20 DIAGNOSIS — M54.42 CHRONIC BILATERAL LOW BACK PAIN WITH LEFT-SIDED SCIATICA: ICD-10-CM

## 2025-05-20 DIAGNOSIS — G89.29 CHRONIC BILATERAL LOW BACK PAIN WITH LEFT-SIDED SCIATICA: ICD-10-CM

## 2025-05-20 DIAGNOSIS — G89.0 CENTRAL PAIN SYNDROME: Primary | ICD-10-CM

## 2025-05-20 DIAGNOSIS — Z96.89 SPINAL CORD STIMULATOR STATUS: ICD-10-CM

## 2025-05-20 RX ORDER — PREGABALIN 50 MG/1
50 CAPSULE ORAL 3 TIMES DAILY
Qty: 90 CAPSULE | Refills: 0 | OUTPATIENT
Start: 2025-05-20 | End: 2025-06-19

## 2025-05-20 RX ORDER — PROMETHAZINE HYDROCHLORIDE 25 MG/1
25 TABLET ORAL EVERY 6 HOURS PRN
Qty: 90 TABLET | Refills: 1 | Status: SHIPPED | OUTPATIENT
Start: 2025-05-20

## 2025-05-22 NOTE — PROGRESS NOTES
SUBJECTIVE:  This is a very pleasant 59 y.o.  male  recall past medical history of obesity BMI 32, Prinzmetal syndrome, MI x 2, coronary artery stents x 8, on Plavix type 2 diabetes on Mounjaro, COPD chronic neck and lower back pain treated with spinal cord stimulator, 1/10/2024 revision Medtronic spinal cord stimulator leads and IPG (MRI compatible) per Dr. Stuart, who is here for follow-up of chronic lower back pain rating to bilateral lower extremities with increased leg cramping at night.  He has increased his water intake and continues with stretching regiment.  He has been out of his pregabalin for almost a week and this may be related to increased cramping and neuropathic pain.  He is currently enrolled in IV infusion therapy every 30 days for neuropathic pain relief which provides 70 to 80% relief for roughly 2.5 weeks and then tapers off.  He is receiving Lyrica 50 mg 3 times a day and ketamine lidocaine nasal spray, and tizanidine which provide significant relief of symptoms. This medication regiment allows him to continue his exercise and stretching training.  He provided a urine drug screen for pregabalin.  A controlled substance agreement for ketamine and pregabalin was read through line by line and signed by patient.  Provided a refill of pregabalin 50 mg 3 times a day, will consider increasing Lyrica to 75 mg at bedtime to reduce symptoms at the end of the day.    Prior office visit:  12/18/2024  Logan Campos is a very pleasant 59 y.o. male recall past medical history of obesity BMI 32, Prinzmetal syndrome, MI x 2, coronary artery stents x 8, on Plavix type 2 diabetes on Mounjaro, COPD chronic neck and lower back pain treated with spinal cord stimulator, 1/10/2024 revision Medtronic spinal cord stimulator leads and IPG (MRI compatible) per Dr. Stuart who is here for follow-up chronic lumbar radiculopathy radiating to bilateral lower extremities left worse than right.  He is receiving IV infusion  therapy once a month which provides 70 to 80% relief of neuropathic pain for roughly 12 to 17 days.  He manages his symptoms with Lyrica 50 mg 3 times a day and ketamine lidocaine nasal spray, tizanidine and medical marijuana which provide significant relief of symptoms and allow him to continue progressing with his ADLs and weight loss journey.  Patient is very active, he walks 6 miles per day and is does weight training and body weight exercises 3-5 times a week.  He continues to work towards his weight loss goal of 190lbs.  Today he is 196lbs.  He denies any new neurological constitutional symptoms.  Plan to continue with IV infusion therapy once a month for chronic lumbar radicular symptoms.  Provided refill of ketamine lidocaine nasal spray.  Urine drug screen and controlled substance agreement up-to-date.  Encourage patient to continue with home exercise and stretching regiment.  Plan to continue with IV infusion therapy once a month, new orders placed.  Plan to follow-up in 3 months or sooner if needed.     Procedures:  1/10/2024 Medtronic spinal cord stimulator leads and IPG revision with MRI compatible with the leads staggered at C7-T3  Few IV infusion therapy the patient has had a 80% improvement in pain and function      Portions of record reviewed for pertinent issues: active problem list, medication list, allergies, family history, social history, notes from last encounter, encounters, lab results, imaging and other available records.      I have personally reviewed the OARRS report for this patient. This report is scanned into the electronic medical record. I have considered the risks of abuse, dependence, addiction and diversion. It showed: marijuana from Dr. Shay and pregabalin 50 mg from Dr. Stuart  Opioid agreement: 7/10/2024  Activities of daily living: On disability walks 6 miles per day, intermittent fasting and resistant training  Adverse effects: None  Analgesia: W/O: 7/10, W 4/10     Toxicology screen: 7/10/2024  Aberrant behavior: None    Review of Systems   Constitutional:  Negative for chills and fever.   Respiratory:  Negative for chest tightness, shortness of breath and wheezing.    Cardiovascular:  Negative for chest pain and palpitations.   Gastrointestinal:  Negative for constipation, diarrhea and nausea.   Genitourinary:  Negative for frequency and urgency.   Musculoskeletal:  Positive for back pain and myalgias.        LBP radiates to bilateral lower extremities with numbness and tingling.    Cramping increased at night,  Continues to be very active with stretching regiment and weight resistance training.       Neurological:  Positive for numbness. Negative for dizziness and headaches.   Psychiatric/Behavioral:  Negative for agitation, confusion and suicidal ideas.         Physical Exam  Constitutional:       General: He is not in acute distress.     Appearance: Normal appearance.       HENT:      Head: Normocephalic.   Eyes:      Extraocular Movements: Extraocular movements intact.   Musculoskeletal:         General: Tenderness present.      Lumbar back: Spasms and tenderness present. Decreased range of motion.      Comments: Lumbar full range of motion, lumbar spondylosis and gluteal region tender to palpation, bilateral sit slump test negative bilateral SLR negative, unable to reproduce any neuropathic symptoms, bilateral lower extremity vibratory sensation reflex intact.  Denies bowel or bladder incontinence or saddle paresthesia.   Neurological:      General: No focal deficit present.      Mental Status: He is alert and oriented to person, place, and time.      GCS: GCS eye subscore is 4. GCS verbal subscore is 5. GCS motor subscore is 6.      Cranial Nerves: Cranial nerves 2-12 are intact.      Sensory: Sensation is intact.      Motor: Motor function is intact.      Coordination: Coordination is intact.      Gait: Gait is intact.      Deep Tendon Reflexes:      Reflex Scores:        Patellar reflexes are 3+ on the right side and 3+ on the left side.       Achilles reflexes are 3+ on the right side and 3+ on the left side.  Psychiatric:         Attention and Perception: Attention and perception normal.         Mood and Affect: Mood normal.         Speech: Speech normal.         Behavior: Behavior normal.         Thought Content: Thought content normal.         Cognition and Memory: Cognition normal.         Judgment: Judgment normal.       Plan  At least 50% of the visit was involved in the discussion of the options for treatment. We discussed exercises, medication, interventional therapies and surgery. Healthy life style is essential with patient hard work to achieve the wellness. In addition; discussion with the patient and/or family about any of the diagnostic results, impressions and/or recommended diagnostic studies, prognosis, risks and benefits of treatment options, instructions for treatment and/or follow-up, importance of compliance with chosen treatment options, risk-factor reduction, and patient/family education.     Provided refill of gabapentin.  Will consider increasing gabapentin to 75 mg at bedtime  Continue with ketamine lidocaine nasal spray and gabapentin, urine drug screen and controlled substance agreement read through line by line and completed  Continue self-directed physical therapy  Plan to continue with IV infusion therapy for neuropathic symptom relief.  Healthy lifestyle and anti-inflammatory diet in addition to weight control discussed with the patient  Alternative chronic pain therapies was discussed, encouraged and information was handed  Return to Clinic 3 months or sooner if needed.     *Please note this report has been produced using speech recognition software and may contain errors related to that system including grammar, punctuation and spelling as well as words and phrases that may be inappropriate. If there are questions or concerns, please feel free  to contact me to clarify.

## 2025-05-23 ENCOUNTER — TELEPHONE (OUTPATIENT)
Dept: PAIN MEDICINE | Facility: CLINIC | Age: 60
End: 2025-05-23
Payer: COMMERCIAL

## 2025-05-23 ENCOUNTER — OFFICE VISIT (OUTPATIENT)
Dept: PAIN MEDICINE | Facility: CLINIC | Age: 60
End: 2025-05-23
Payer: COMMERCIAL

## 2025-05-23 VITALS
RESPIRATION RATE: 10 BRPM | OXYGEN SATURATION: 97 % | TEMPERATURE: 96.4 F | HEART RATE: 78 BPM | DIASTOLIC BLOOD PRESSURE: 73 MMHG | HEIGHT: 68 IN | BODY MASS INDEX: 28.19 KG/M2 | WEIGHT: 186 LBS | SYSTOLIC BLOOD PRESSURE: 116 MMHG

## 2025-05-23 DIAGNOSIS — M54.17 LUMBOSACRAL RADICULOPATHY: ICD-10-CM

## 2025-05-23 DIAGNOSIS — Z96.89 SPINAL CORD STIMULATOR STATUS: ICD-10-CM

## 2025-05-23 DIAGNOSIS — G61.9 INFLAMMATORY NEUROPATHY (MULTI): ICD-10-CM

## 2025-05-23 DIAGNOSIS — G89.4 CHRONIC PAIN SYNDROME: ICD-10-CM

## 2025-05-23 DIAGNOSIS — Z79.899 MEDICATION MANAGEMENT: Primary | ICD-10-CM

## 2025-05-23 PROBLEM — F43.29 ADJUSTMENT DISORDER WITH MIXED EMOTIONAL FEATURES: Status: ACTIVE | Noted: 2025-05-23

## 2025-05-23 PROCEDURE — 99417 PROLNG OP E/M EACH 15 MIN: CPT | Performed by: NURSE PRACTITIONER

## 2025-05-23 PROCEDURE — 99215 OFFICE O/P EST HI 40 MIN: CPT | Performed by: NURSE PRACTITIONER

## 2025-05-23 RX ORDER — ALBUTEROL SULFATE 0.83 MG/ML
3 SOLUTION RESPIRATORY (INHALATION) AS NEEDED
OUTPATIENT
Start: 2025-06-04

## 2025-05-23 RX ORDER — METOCLOPRAMIDE HYDROCHLORIDE 5 MG/ML
10 INJECTION INTRAMUSCULAR; INTRAVENOUS ONCE
OUTPATIENT
Start: 2025-06-04 | End: 2025-06-04

## 2025-05-23 RX ORDER — DIPHENHYDRAMINE HYDROCHLORIDE 50 MG/ML
50 INJECTION, SOLUTION INTRAMUSCULAR; INTRAVENOUS AS NEEDED
OUTPATIENT
Start: 2025-06-04

## 2025-05-23 RX ORDER — METOPROLOL TARTRATE 25 MG/1
25 TABLET, FILM COATED ORAL ONCE
OUTPATIENT
Start: 2025-06-04 | End: 2025-06-04

## 2025-05-23 RX ORDER — PREGABALIN 50 MG/1
50 CAPSULE ORAL 3 TIMES DAILY
Qty: 90 CAPSULE | Refills: 0 | Status: SHIPPED | OUTPATIENT
Start: 2025-05-23 | End: 2025-06-22

## 2025-05-23 RX ORDER — ONDANSETRON HYDROCHLORIDE 2 MG/ML
4 INJECTION, SOLUTION INTRAVENOUS ONCE
OUTPATIENT
Start: 2025-06-04 | End: 2025-06-04

## 2025-05-23 RX ORDER — PREGABALIN 50 MG/1
50 CAPSULE ORAL 3 TIMES DAILY
Qty: 90 CAPSULE | Refills: 0 | Status: SHIPPED | OUTPATIENT
Start: 2025-05-23 | End: 2025-05-23 | Stop reason: SDUPTHER

## 2025-05-23 RX ORDER — KETAMINE HCL 100 %
POWDER (GRAM) MISCELLANEOUS
Qty: 20 G | Refills: 5 | Status: SHIPPED | OUTPATIENT
Start: 2025-05-23

## 2025-05-23 RX ORDER — DIPHENHYDRAMINE HYDROCHLORIDE 50 MG/ML
25 INJECTION, SOLUTION INTRAMUSCULAR; INTRAVENOUS ONCE
OUTPATIENT
Start: 2025-06-04 | End: 2025-06-04

## 2025-05-23 RX ORDER — NITROGLYCERIN 0.4 MG/1
0.4 TABLET SUBLINGUAL ONCE
OUTPATIENT
Start: 2025-06-04 | End: 2025-06-04

## 2025-05-23 RX ORDER — FAMOTIDINE 10 MG/ML
20 INJECTION, SOLUTION INTRAVENOUS ONCE AS NEEDED
OUTPATIENT
Start: 2025-06-04

## 2025-05-23 RX ORDER — EPINEPHRINE 0.3 MG/.3ML
0.3 INJECTION SUBCUTANEOUS EVERY 5 MIN PRN
OUTPATIENT
Start: 2025-06-04

## 2025-05-23 RX ORDER — KETOROLAC TROMETHAMINE 30 MG/ML
30 INJECTION, SOLUTION INTRAMUSCULAR; INTRAVENOUS ONCE
OUTPATIENT
Start: 2025-06-04 | End: 2025-06-04

## 2025-05-23 ASSESSMENT — ENCOUNTER SYMPTOMS
CONFUSION: 0
FEVER: 0
BACK PAIN: 1
NUMBNESS: 1
HEADACHES: 0
DIZZINESS: 0
NAUSEA: 0
MYALGIAS: 1
PALPITATIONS: 0
OCCASIONAL FEELINGS OF UNSTEADINESS: 0
FREQUENCY: 0
CHEST TIGHTNESS: 0
SHORTNESS OF BREATH: 0
CONSTIPATION: 0
LOSS OF SENSATION IN FEET: 1
DIARRHEA: 0
AGITATION: 0
CHILLS: 0
WHEEZING: 0
DEPRESSION: 0

## 2025-05-23 ASSESSMENT — PAIN SCALES - GENERAL
PAINLEVEL_OUTOF10: 6
PAINLEVEL_OUTOF10: 6

## 2025-05-23 ASSESSMENT — PATIENT HEALTH QUESTIONNAIRE - PHQ9
SUM OF ALL RESPONSES TO PHQ9 QUESTIONS 1 & 2: 0
2. FEELING DOWN, DEPRESSED OR HOPELESS: NOT AT ALL
1. LITTLE INTEREST OR PLEASURE IN DOING THINGS: NOT AT ALL

## 2025-05-23 ASSESSMENT — PAIN DESCRIPTION - DESCRIPTORS: DESCRIPTORS: THROBBING;ACHING;CRAMPING

## 2025-05-23 ASSESSMENT — PAIN - FUNCTIONAL ASSESSMENT: PAIN_FUNCTIONAL_ASSESSMENT: 0-10

## 2025-05-23 NOTE — TELEPHONE ENCOUNTER
----- Message from Zeke Rudd sent at 5/22/2025  2:39 PM EDT -----  Regarding: Lyrica & ketamine  Good afternoon,I am seeing this patient tomorrow he is currently on Lyrica and ketamine nasal spray I have given him a few refills however he continues with medical marijuana as he has been on it for significant amount of time.  I do not plan on refilling Lyrica and ketamine nasal spray, would you like me to send the refills to you?

## 2025-05-25 LAB
AMPHETAMINES UR QL: NEGATIVE NG/ML
BARBITURATES UR QL: NEGATIVE NG/ML
BENZODIAZ UR QL: NEGATIVE NG/ML
BZE UR QL: NEGATIVE NG/ML
CODEINE UR-MCNC: NEGATIVE NG/ML
CREAT UR-MCNC: 9.2 MG/DL
DRUG SCREEN COMMENT UR-IMP: ABNORMAL
DRUG SCREEN COMMENT UR-IMP: ABNORMAL
DRUG SCREEN COMMENT UR-IMP: NORMAL
FENTANYL UR QL SCN: NEGATIVE NG/ML
HYDROCODONE UR-MCNC: NEGATIVE NG/ML
HYDROMORPHONE UR-MCNC: NEGATIVE NG/ML
METHADONE UR QL: NEGATIVE NG/ML
MORPHINE UR-MCNC: NEGATIVE NG/ML
NORHYDROCODONE UR CFM-MCNC: NEGATIVE NG/ML
OPIATES UR QL: ABNORMAL NG/ML
OXIDANTS UR QL: NEGATIVE MCG/ML
OXYCODONE UR QL: NEGATIVE NG/ML
PCP UR QL: NEGATIVE NG/ML
PH UR: 7 [PH] (ref 4.5–9)
PREGABALIN UR CFM-MCNC: NEGATIVE NG/ML
QUEST NOTES AND COMMENTS: NORMAL
SP GR UR: 1
THC UR QL: POSITIVE NG/ML
THC UR-MCNC: 112 NG/ML

## 2025-05-27 DIAGNOSIS — G61.9 INFLAMMATORY NEUROPATHY (MULTI): ICD-10-CM

## 2025-05-28 ENCOUNTER — APPOINTMENT (OUTPATIENT)
Dept: OTOLARYNGOLOGY | Facility: CLINIC | Age: 60
End: 2025-05-28
Payer: COMMERCIAL

## 2025-05-28 ENCOUNTER — APPOINTMENT (OUTPATIENT)
Dept: AUDIOLOGY | Facility: CLINIC | Age: 60
End: 2025-05-28
Payer: COMMERCIAL

## 2025-05-28 DIAGNOSIS — H61.21 IMPACTED CERUMEN OF RIGHT EAR: ICD-10-CM

## 2025-05-28 DIAGNOSIS — H93.13 TINNITUS OF BOTH EARS: ICD-10-CM

## 2025-05-28 DIAGNOSIS — H91.21 SUDDEN RIGHT HEARING LOSS: ICD-10-CM

## 2025-05-28 DIAGNOSIS — H93.291 IMPAIRED AUDITORY DISCRIMINATION, RIGHT: ICD-10-CM

## 2025-05-28 DIAGNOSIS — H93.11 SUBJECTIVE TINNITUS OF RIGHT EAR: ICD-10-CM

## 2025-05-28 DIAGNOSIS — H90.3 ASYMMETRICAL SENSORINEURAL HEARING LOSS: Primary | ICD-10-CM

## 2025-05-28 DIAGNOSIS — R26.89 IMBALANCE: ICD-10-CM

## 2025-05-28 PROCEDURE — 92557 COMPREHENSIVE HEARING TEST: CPT | Performed by: AUDIOLOGIST

## 2025-05-28 PROCEDURE — 69210 REMOVE IMPACTED EAR WAX UNI: CPT | Performed by: STUDENT IN AN ORGANIZED HEALTH CARE EDUCATION/TRAINING PROGRAM

## 2025-05-28 PROCEDURE — 92567 TYMPANOMETRY: CPT | Performed by: AUDIOLOGIST

## 2025-05-28 PROCEDURE — 99214 OFFICE O/P EST MOD 30 MIN: CPT | Performed by: STUDENT IN AN ORGANIZED HEALTH CARE EDUCATION/TRAINING PROGRAM

## 2025-05-28 PROCEDURE — 4010F ACE/ARB THERAPY RXD/TAKEN: CPT | Performed by: STUDENT IN AN ORGANIZED HEALTH CARE EDUCATION/TRAINING PROGRAM

## 2025-05-28 PROCEDURE — 1036F TOBACCO NON-USER: CPT | Performed by: STUDENT IN AN ORGANIZED HEALTH CARE EDUCATION/TRAINING PROGRAM

## 2025-05-28 RX ORDER — DULOXETIN HYDROCHLORIDE 60 MG/1
60 CAPSULE, DELAYED RELEASE ORAL DAILY
Qty: 90 CAPSULE | Refills: 3 | Status: SHIPPED | OUTPATIENT
Start: 2025-05-28

## 2025-05-28 NOTE — PROGRESS NOTES
[facility]  AUDIOMETRIC EVALUATION      Name:  Logan Campos   :  1965  Age:  59 y.o.  Date of Evaluation:  25    HISTORY    Logan Campos is seen today at the request of Robert Aj M.D.  The patient is an established patient monitoring hearing loss progression.    EVALUATION  See scanned audiogram in Media and included at the end of this report.    RESULTS    Otoscopic Evaluation:  Right Ear:  mostly clear   Left Ear:  mostly clear    Tympanometry:   Right Ear:  Type A, consistent with normal eardrum mobility and middle ear pressure   Left Ear:  Type A, consistent with normal eardrum mobility and middle ear pressure    Ipsilateral acoustic reflexes were not completed    Pure Tone Audiometry:    Right Ear:  mild to severe sensorineural hearing loss  Left Ear:  normal to severe sensorineural hearing loss       Speech Audiometry:    Right Ear:  good in quiet at an elevated presentation level  Left Ear:  excellent in quiet at a normal presentation level  Speech reception thresholds were in good agreement with pure tone testing.    DISCUSSION  Results were relayed to Robert Aj M.D.    APPOINTMENT TIME  3:00pm-3:30pm     Devin Snow  Doctor of Audiology  Senior Audiologist

## 2025-05-28 NOTE — PROGRESS NOTES
HPI  5/28/25  The patient presents for follow-up, reports improvement of his right hearing loss.  Endorses persistent high-frequency tinnitus R>L.  Denies vertiginous events.  Recall   Logan Campos is a 59 y.o. male presenting for evaluation of right hearing loss.  Reports developing right sudden hearing loss on [place] Day, this was associated with right aural fullness and brief episodes of dizziness/vertigo lasting less than a minute.  Reports a long history of bilateral tinnitus which worsened on the right.   States dizziness/vertigo is improving.  Currently completing a prednisone course without significant improvement of his hearing loss.   Denies current otalgia, otorrhea, autophony or history of ear surgeries.  Denies focal neurologic deficits or headaches.       Past Medical History:   Diagnosis Date    Anxiety     Arthritis     Coronary artery disease     Depression     Diabetes mellitus (Multi)     Diverticulosis     Dizziness 12/25/2024    Ear pain 2024    Hiatal hernia     HL (hearing loss) 12/25/2024    Hypertension     Lumbar disc disease     Myocardial infarction (Multi)     Personal history of other diseases of the circulatory system     History of coronary atherosclerosis    Personal history of other diseases of the digestive system     History of gastroesophageal reflux (GERD)    Personal history of other diseases of the nervous system and sense organs     History of neuropathy    Pure hypercholesterolemia, unspecified     High cholesterol    Tinnitus 1992    Vision loss        Past Surgical History:   Procedure Laterality Date    ADENOIDECTOMY  1973    BACK SURGERY  12/19/2014    Back Surgery    CARDIAC CATHETERIZATION      CARDIAC CATHETERIZATION N/A 4/17/2025    Procedure: Left Heart Cath;  Surgeon: Alberto Barnett MD PhD;  Location: [facility] Cardiac [facility] Lab;  Service: Cardiovascular;  Laterality: N/A;  Radial    COLONOSCOPY      CORONARY STENT PLACEMENT      HERNIA REPAIR  12/19/2014     Hernia Repair    MENISCECTOMY      OTHER SURGICAL HISTORY  12/19/2014    Arterial Catheterization    TONSILLECTOMY  12/19/2014    Tonsillectomy    VASECTOMY  12/19/2014    Surgery Vas Deferens Vasectomy         Current Outpatient Medications on File Prior to Visit   Medication Sig Dispense Refill    aspirin 325 mg tablet Take 1 tablet (325 mg) by mouth once daily at bedtime.      atorvastatin (Lipitor) 80 mg tablet Take 1 tablet (80 mg) by mouth once daily. 90 tablet 3    cholecalciferol (Vitamin D-3) 5,000 Units tablet Take 1 tablet (125 mcg) by mouth once daily.      chrm/vineg/bit-orang peel/gr t (APPLE CIDER VINEGAR PLUS ORAL) Take 2 capsules by mouth once daily.      clopidogrel (Plavix) 75 mg tablet Take 1 tablet (75 mg) by mouth once daily. 90 tablet 3    dapaglifloz propaned-metformin (Xigduo XR) 5-1,000 mg Take 1 tablet by mouth once daily. Take with food. 90 tablet 3    DULoxetine (Cymbalta) 60 mg DR capsule TAKE 1 CAPSULE BY MOUTH ONCE DAILY. 90 capsule 3    EPINEPHrine (EpiPen) 0.3 mg/0.3 mL injection syringe Inject 0.3 mL (0.3 mg) into the muscle 1 time if needed for anaphylaxis. Inject into upper leg. Call 911 after use.      fish oil concentrate (Omega-3) 120-180 mg capsule Take 6 capsules (6,000 mg) by mouth once daily in the morning.      hydrOXYzine HCL (Atarax) 25 mg tablet Take 1-2 tablets (25-50 mg) by mouth as needed at bedtime (insomnia).      hydrOXYzine pamoate (Vistaril) 50 mg capsule Take 1 capsule (50 mg) by mouth 1 time.      isosorbide mononitrate ER (Imdur) 30 mg 24 hr tablet Take 2 tablets (60 mg) by mouth once daily. 180 tablet 3    ketamine HCl (ketamine, bulk,) 100 % powder Ketamine 100 mg/mL + lidocaine 40 mg/mL 1 puff 4 times daily as needed, DSP#20 mL x 5 refills 20 g 5    lidocaine HCl, bulk, 100 % powder powder In ketamine nasal spray      losartan (Cozaar) 50 mg tablet Take 1 tablet (50 mg) by mouth once daily. 90 tablet 3    Myrbetriq 50 mg tablet extended release 24 hr 24  hr tablet Take 1 tablet (50 mg) by mouth once daily. 90 tablet 3    nitroglycerin (Nitrostat) 0.4 mg SL tablet Place 1 tablet (0.4 mg) under the tongue every 5 minutes if needed for chest pain.      NON FORMULARY Alive veggie based vitamins      NON FORMULARY Medical Marijuana      pantoprazole (ProtoNix) 40 mg EC tablet Take 1 tablet (40 mg) by mouth once daily. Do not crush, chew, or split. 90 tablet 3    pregabalin (Lyrica) 50 mg capsule Take 1 capsule (50 mg) by mouth 3 times a day. 90 capsule 0    promethazine (Phenergan) 25 mg tablet Take 1 tablet (25 mg) by mouth every 6 hours if needed for nausea. 90 tablet 1    rOPINIRole (Requip) 1 mg tablet TAKE 1 TABLET (1 MG) BY MOUTH ONCE DAILY AT BEDTIME. 90 tablet 3    sertraline (Zoloft) 100 mg tablet Take 1 tablet (100 mg) by mouth once daily in the morning. Take with 50 mg tab for total daily dose 150 mg      sertraline (Zoloft) 50 mg tablet Take 1 tablet (50 mg) by mouth once daily in the morning. Take with 100 mg tab for total daily dose 150 mg      tamsulosin (Flomax) 0.4 mg 24 hr capsule Take 1 capsule (0.4 mg) by mouth once daily. 90 capsule 3    tirzepatide (Mounjaro) 7.5 mg/0.5 mL pen injector Inject 7.5 mg under the skin every 7 days. 6 mL 4    tiZANidine (Zanaflex) 4 mg tablet TAKE 1 TABLET (4 MG) BY MOUTH 3 TIMES A DAY AS NEEDED FOR MUSCLE SPASMS. 90 tablet 11    traZODone (Desyrel) 100 mg tablet Take 1 tablet (100 mg) by mouth once daily at bedtime. 90 tablet 3    trospium (Sanctura XR) 60 mg 24 hour capsule Take 1 capsule (60 mg) by mouth once daily. 90 capsule 3    VITAMIN B COMPLEX ORAL Take 1 tablet by mouth once daily in the morning.      Tiadylt  mg 24 hr capsule Take 1 capsule (360 mg) by mouth once daily. 90 capsule 3    [DISCONTINUED] cetirizine (ZyrTEC) 10 mg tablet Take 1 tablet (10 mg) by mouth once daily. (Patient not taking: Reported on 4/16/2025) 30 tablet 0    [DISCONTINUED] DULoxetine (Cymbalta) 60 mg DR capsule Take 1 capsule (60  mg) by mouth once daily. 30 capsule 11    [DISCONTINUED] fluticasone (Flonase) 50 mcg/actuation nasal spray Administer 1 spray into each nostril once daily. Shake gently. Before first use, prime pump. After use, clean tip and replace cap. (Patient not taking: Reported on 4/16/2025) 16 g 0    [DISCONTINUED] ketamine HCl (ketamine, bulk,) 100 % powder Ketamine 100 mg/mL + lidocaine 40 mg/mL 1 puff 4 times daily as needed, DSP#20 mL x 5 refills 20 g 5    [DISCONTINUED] oxyCODONE-acetaminophen (Percocet) 5-325 mg tablet Take 1 tablet by mouth every 4 hours if needed for severe pain (7 - 10). (Patient not taking: Reported on 5/23/2025) 12 tablet 0    [DISCONTINUED] pregabalin (Lyrica) 50 mg capsule TAKE 1 CAPSULE (50 MG) BY MOUTH 3 TIMES A DAY. 90 capsule 0    [DISCONTINUED] pregabalin (Lyrica) 50 mg capsule Take 1 capsule (50 mg) by mouth 3 times a day. 90 capsule 0     No current facility-administered medications on file prior to visit.        Allergies   Allergen Reactions    Cat Dander Shortness of breath    Onion Anaphylaxis    Hydromorphone Rash and Other     Agent: Dilaudid        Review of Systems  A detailed 12 point ROS was performed and is negative except as noted in the intake form, HPI and/or Past Medical History        Physical Exam   CONSTITUTIONAL: Well developed, well nourished.  VOICE: Normal voice quality  RESPIRATION: Breathing comfortably, no stridor.  CV: No clubbing/cyanosis/edema in hands.  EYES: EOM Intact, sclera normal.  NEURO: Alert and oriented times 3, Cranial nerves V,VII intact and symmetric bilaterally.  HEAD AND FACE: Symmetric facial features, no masses or lesions, sinuses nontender to palpation.  SALIVARY GLANDS: Parotid and submandibular glands normal bilaterally.  + EARS: Normal external ear  Right EAC with cerumen obstruction (removed)  Right EAC patent, tympanic membrane intact  Left EAC patent, tympanic membrane intact  NOSE: External nose midline, anterior rhinoscopy is normal  with limited visualization to the anterior aspect of the interior turbinates. No lesions noted.  ORAL CAVITY/OROPHARYNX/LIPS: Normal mucous membranes, normal floor of mouth/tongue/OP, no masses or lesions are noted.  PHARYNGEAL WALLS AND NASOPHARYNX: No masses noted. Mucosa appears clean and moist  NECK/LYMPH: No LAD, no thyroid masses. Trachea palpably midline  SKIN: Neck skin is without injury  PSYCH: Alert and oriented with appropriate mood and affect     Procedure: Removal of impacted cerumen, right   Indication: Cerumen impaction.   The procedure was reviewed and explained.  Verbal consent was obtained.  With the use of the otoscope the right ear was examined, cerumen was cleaned with the use of curettes. The patient tolerated the procedure well.       Results:   Audiogram 5/20/2025 personally reviewed  Right: Mild sensorineural hearing loss up to 2000 Hz sloping to moderately severe sensorineural hearing loss.  Speech discrimination scores 72%.  Type A tympanogram.  Left: Normal hearing up to 2000 Hz sloping to moderately severe sensorineural hearing loss.  Speech discrimination score 96%.  Type A tympanogram.      Assessment  Right sudden hearing loss  Asymmetric sensorineural hearing loss    Plan  59-year-old male developing right sudden hearing loss on [place] Day s/p systemic steroids and right transtympanic dexamethasone injection.  MRI IAC 2/25/25: No CPA/IAC masses.   Audiogram performed today notable for improvement of his right sensorineural hearing loss.  Multiple masking techniques for tinnitus management discussed.  Rehabilitative options for SNHL discussed, he will like to proceed with HAE, he will follow-up with audiology for this purpose.   We will monitor his hearing with yearly audiograms.  RTC 1y

## 2025-06-03 RX ORDER — ALBUTEROL SULFATE 0.83 MG/ML
3 SOLUTION RESPIRATORY (INHALATION) AS NEEDED
OUTPATIENT
Start: 2025-06-04

## 2025-06-03 RX ORDER — EPINEPHRINE 0.3 MG/.3ML
0.3 INJECTION SUBCUTANEOUS EVERY 5 MIN PRN
OUTPATIENT
Start: 2025-06-04

## 2025-06-03 RX ORDER — DIPHENHYDRAMINE HYDROCHLORIDE 50 MG/ML
50 INJECTION, SOLUTION INTRAMUSCULAR; INTRAVENOUS AS NEEDED
OUTPATIENT
Start: 2025-06-04

## 2025-06-03 RX ORDER — FAMOTIDINE 10 MG/ML
20 INJECTION, SOLUTION INTRAVENOUS ONCE AS NEEDED
OUTPATIENT
Start: 2025-06-04

## 2025-06-04 DIAGNOSIS — I25.118 CORONARY ARTERY DISEASE OF NATIVE ARTERY OF NATIVE HEART WITH STABLE ANGINA PECTORIS: ICD-10-CM

## 2025-06-04 RX ORDER — DILTIAZEM HYDROCHLORIDE 360 MG/1
360 CAPSULE, EXTENDED RELEASE ORAL DAILY
Qty: 90 CAPSULE | Refills: 3 | Status: SHIPPED | OUTPATIENT
Start: 2025-06-04 | End: 2026-06-04

## 2025-06-06 ENCOUNTER — APPOINTMENT (OUTPATIENT)
Dept: INFUSION THERAPY | Facility: CLINIC | Age: 60
End: 2025-06-06
Payer: COMMERCIAL

## 2025-06-18 ENCOUNTER — OFFICE VISIT (OUTPATIENT)
Dept: CARDIOLOGY | Facility: CLINIC | Age: 60
End: 2025-06-18
Payer: MEDICARE

## 2025-06-18 VITALS
BODY MASS INDEX: 29.1 KG/M2 | HEIGHT: 68 IN | DIASTOLIC BLOOD PRESSURE: 54 MMHG | WEIGHT: 192 LBS | HEART RATE: 76 BPM | OXYGEN SATURATION: 96 % | SYSTOLIC BLOOD PRESSURE: 94 MMHG

## 2025-06-18 DIAGNOSIS — E78.2 MIXED HYPERLIPIDEMIA: ICD-10-CM

## 2025-06-18 DIAGNOSIS — I25.2 H/O ACUTE MYOCARDIAL INFARCTION: ICD-10-CM

## 2025-06-18 DIAGNOSIS — I20.1 PRINZMETAL VARIANT ANGINA: ICD-10-CM

## 2025-06-18 DIAGNOSIS — I25.118 CORONARY ARTERY DISEASE OF NATIVE ARTERY OF NATIVE HEART WITH STABLE ANGINA PECTORIS: Primary | ICD-10-CM

## 2025-06-18 DIAGNOSIS — Z95.5 CORONARY STENT PATENT: ICD-10-CM

## 2025-06-18 DIAGNOSIS — I95.9 HYPOTENSION, UNSPECIFIED HYPOTENSION TYPE: ICD-10-CM

## 2025-06-18 DIAGNOSIS — I65.29 CAROTID ATHEROSCLEROSIS, UNSPECIFIED LATERALITY: ICD-10-CM

## 2025-06-18 DIAGNOSIS — I10 BENIGN ESSENTIAL HTN: ICD-10-CM

## 2025-06-18 DIAGNOSIS — I10 ESSENTIAL HYPERTENSION: ICD-10-CM

## 2025-06-18 DIAGNOSIS — G89.4 CHRONIC PAIN SYNDROME: ICD-10-CM

## 2025-06-18 PROCEDURE — 99214 OFFICE O/P EST MOD 30 MIN: CPT

## 2025-06-18 PROCEDURE — 4010F ACE/ARB THERAPY RXD/TAKEN: CPT

## 2025-06-18 PROCEDURE — 3074F SYST BP LT 130 MM HG: CPT

## 2025-06-18 PROCEDURE — 3078F DIAST BP <80 MM HG: CPT

## 2025-06-18 PROCEDURE — 1036F TOBACCO NON-USER: CPT

## 2025-06-18 PROCEDURE — 99212 OFFICE O/P EST SF 10 MIN: CPT

## 2025-06-18 PROCEDURE — 3008F BODY MASS INDEX DOCD: CPT

## 2025-06-18 RX ORDER — LOSARTAN POTASSIUM 25 MG/1
25 TABLET ORAL DAILY
Qty: 90 TABLET | Refills: 3 | Status: SHIPPED | OUTPATIENT
Start: 2025-06-18

## 2025-06-18 ASSESSMENT — PAIN SCALES - GENERAL: PAINLEVEL_OUTOF10: 0-NO PAIN

## 2025-06-18 ASSESSMENT — ENCOUNTER SYMPTOMS
OCCASIONAL FEELINGS OF UNSTEADINESS: 0
DEPRESSION: 0
LOSS OF SENSATION IN FEET: 0

## 2025-06-18 NOTE — PROGRESS NOTES
"Chief Complaint:   6-month follow-up     History Of Present Illness:    I am seeing Logan today for his routine 6-month follow-up.  He reports he has been feeling well since his last visit and denies any new cardiac symptoms. He did have a bout of chest pain for which he was evaluated in the hospital and underwent left heart catheterization which revealed patent stents and nonobstructive coronary artery disease.  Patient denies any further recurrences of chest pain since discharge home hospitalization. He denies any CP, SOB, palpitations, lightheadedness, syncope, orthopnea, PND, lower extremity edema.      12/18/2024  Logna Campos is a 59 y.o. male with PMHx of CAD with hx of x2 MI and x8 coronary stents (on plavix), prinzmetal varian angina, T2DM, COPD, dysmetabolic syndrome X, and chronic back pain presenting today for 6-month follow-up.  Patient reports he has been feeling well since last seen in our office.  He denies any new cardiac symptoms or complaints.  He continues to have occasional chest discomfort that is related to his known Prinzmetal variant angina, he is on Imdur for this and has a neurostimulator. He denies any palpitations, lightheadedness, dizziness, syncope, orthopnea, PND, lower extremity edema.  He reports he has lost a total of 103 pounds since 2020, and his BMI today is 30.1.  He is physically active and is walking 6 miles/day, which consist of three 2 mile walks/day, and he tells me he also does body weigh exercises.     Last Recorded Vitals:  Vitals:    06/18/25 1406   BP: 94/54   BP Location: Left arm   Patient Position: Sitting   Pulse: 76   SpO2: 96%   Weight: 87.1 kg (192 lb)   Height: 1.727 m (5' 8\")     Past Medical History:  He has a past medical history of Anxiety, Arthritis, Coronary artery disease, Depression, Diabetes mellitus (Multi), Diverticulosis, Dizziness (12/25/2024), Ear pain (2024), Hiatal hernia, HL (hearing loss) (12/25/2024), Hypertension, Lumbar disc disease, " Myocardial infarction (Multi), Personal history of other diseases of the circulatory system, Personal history of other diseases of the digestive system, Personal history of other diseases of the nervous system and sense organs, Pure hypercholesterolemia, unspecified, Tinnitus (1992), and Vision loss.    Past Surgical History:  He has a past surgical history that includes Back surgery (12/19/2014); Hernia repair (12/19/2014); Other surgical history (12/19/2014); Tonsillectomy (12/19/2014); Vasectomy (12/19/2014); Cardiac catheterization; Coronary stent placement; Colonoscopy; Meniscectomy; Adenoidectomy (1973); and Cardiac catheterization (N/A, 4/17/2025).      Social History:  He reports that he has never smoked. He has never been exposed to tobacco smoke. He has never used smokeless tobacco. He reports current alcohol use of about 3.0 standard drinks of alcohol per week. He reports current drug use. Frequency: 7.00 times per week. Drug: Marijuana.    Family History:  Family History   Problem Relation Name Age of Onset    Stroke Father Ben Campos Sr     Hearing loss Father Ben Campos Sr     Breast cancer Sister      Stomach cancer Maternal Grandmother Sade Corbin     Cancer Maternal Grandmother Sade Corbin     Cancer Sister Shemar Ralph     Cancer Sister Karol Campos      Allergies:  Cat dander, Onion, and Hydromorphone    Outpatient Medications:  Current Outpatient Medications   Medication Instructions    aspirin 325 mg tablet 1 tablet, Nightly    atorvastatin (LIPITOR) 80 mg, oral, Daily    cholecalciferol (Vitamin D-3) 5,000 Units tablet 1 tablet, Daily    chrm/vineg/bit-orang peel/gr t (APPLE CIDER VINEGAR PLUS ORAL) 2 capsules, Daily    clopidogrel (PLAVIX) 75 mg, oral, Daily    dapaglifloz propaned-metformin (Xigduo XR) 5-1,000 mg 1 tablet, oral, Daily, Take with food.    DULoxetine (CYMBALTA) 60 mg, oral, Daily    EPINEPHrine (EpiPen) 0.3 mg/0.3 mL injection syringe 0.3 mL, Once as needed    fish oil  concentrate (Omega-3) 120-180 mg capsule 6,000 mg, Every morning    hydrOXYzine HCL (ATARAX) 25-50 mg, Nightly PRN    hydrOXYzine pamoate (VISTARIL) 50 mg, Once    isosorbide mononitrate ER (IMDUR) 60 mg, oral, Daily    ketamine HCl (ketamine, bulk,) 100 % powder Ketamine 100 mg/mL + lidocaine 40 mg/mL 1 puff 4 times daily as needed, DSP#20 mL x 5 refills    lidocaine HCl, bulk, 100 % powder powder In ketamine nasal spray    losartan (COZAAR) 50 mg, oral, Daily    Mounjaro 7.5 mg, subcutaneous, Every 7 days    Myrbetriq 50 mg, oral, Daily    nitroglycerin (NITROSTAT) 0.4 mg, Every 5 min PRN    NON FORMULARY Alive veggie based vitamins    NON FORMULARY Medical Marijuana    pantoprazole (PROTONIX) 40 mg, oral, Daily, Do not crush, chew, or split.    pregabalin (LYRICA) 50 mg, oral, 3 times daily    promethazine (PHENERGAN) 25 mg, oral, Every 6 hours PRN    rOPINIRole (REQUIP) 1 mg, oral, Nightly    sertraline (ZOLOFT) 50 mg, Every morning    sertraline (ZOLOFT) 100 mg, Every morning    tamsulosin (FLOMAX) 0.4 mg, oral, Daily    Tiadylt  mg, oral, Daily    tiZANidine (ZANAFLEX) 4 mg, oral, 3 times daily PRN    traZODone (DESYREL) 100 mg, oral, Nightly    trospium (SANCTURA XR) 60 mg, oral, Daily    VITAMIN B COMPLEX ORAL 1 tablet, Every morning     Physical Exam:  General: no acute distress  HEENT: EOMI, no scleral icterus.  Lungs: Clear to auscultation bilaterally without wheezing, rales, or rhonchi.  Cardiovascular: Regular rhythm and rate. Normal S1 and S2. No murmurs, rubs, or gallops are appreciated. JVP normal.  Abdomen: Soft, nontender, nondistended. Bowel sounds present.  Extremities: Warm and well perfused with equal 2+ pulses bilaterally.  No edema.  Neurologic: Alert and oriented x3.      Last Labs:  CBC -  Lab Results   Component Value Date    WBC 8.0 04/17/2025    HGB 13.7 04/17/2025    HCT 42.4 04/17/2025    MCV 94 04/17/2025     04/17/2025     CMP -  Lab Results   Component Value Date     CALCIUM 8.8 04/17/2025    CALCIUM 9.2 03/17/2025    PHOS 3.2 04/17/2025    PROT 6.9 04/16/2025    PROT 6.6 03/17/2025    ALBUMIN 4.1 04/17/2025    ALBUMIN 4.5 03/17/2025    AST 24 04/16/2025    AST 24 03/17/2025    ALT 15 04/16/2025    ALT 18 03/17/2025    ALKPHOS 68 04/16/2025    ALKPHOS 77 03/17/2025    BILITOT 0.4 04/16/2025    BILITOT 0.5 03/17/2025     LIPID PANEL -   Lab Results   Component Value Date    CHOL 124 03/17/2025    TRIG 77 03/17/2025    HDL 61 03/17/2025    CHHDL 2.0 03/17/2025    LDLF 57 01/10/2023    VLDL 25 05/14/2024    NHDL 63 03/17/2025     RENAL FUNCTION PANEL -   Lab Results   Component Value Date    GLUCOSE 100 (H) 04/17/2025    GLUCOSE 117 (H) 03/17/2025     04/17/2025     03/17/2025    K 4.5 04/17/2025    K 4.0 03/17/2025     (H) 04/17/2025     03/17/2025    CO2 29 04/17/2025    CO2 29 03/17/2025    ANIONGAP 10 04/17/2025    ANIONGAP 10 03/17/2025    BUN 12 04/17/2025    BUN 14 03/17/2025    CREATININE 1.25 04/17/2025    CREATININE 1.15 03/17/2025    GFRMALE 75 03/07/2023    CALCIUM 8.8 04/17/2025    CALCIUM 9.2 03/17/2025    PHOS 3.2 04/17/2025    ALBUMIN 4.1 04/17/2025    ALBUMIN 4.5 03/17/2025      Lab Results   Component Value Date    BNP 56 04/16/2025    HGBA1C 5.3 10/28/2024    HGBA1C 5.6 06/24/2024     Last Cardiology Tests:  ECG:  ECG 12 lead 01/09/2024  Normal sinus rhythm  Nonspecific T wave abnormality    Echo:  Transthoracic Echo (TTE) Complete 10/31/2023   1. Left ventricular systolic function is normal with a 60% estimated ejection fraction.   2. Spectral Doppler shows an impaired relaxation pattern of left ventricular diastolic filling.    Cath:  [facility] 4/17/2025  Patent prior stents with no residual obstructive coronary artery disease.  1. Short left main without significant stenosis      Patent stents in proximal and mid LAD and diagonal branch and luminal irregularities in the rest of the vessel      Luminal irregularities throughout left circumflex  system      Irregularities throughout RCA      Right dominant coronary artery system      LVEDP 10 mmHg without significant gradient across aortic valve.    [facility] 3/8/2023  1. Non-obstructive coronary artery disease.     Stress Test:  Nuclear Stress Test 01/18/2023  Abnormal Lexiscan Myoview cardiac perfusion stress test.  Mild small anteroapical myocardial ischemia by perfusion imaging.  No myocardial infarction by perfusion imaging.  Abnormal left ventricular systolic function.  Left ventricular ejection fraction 49 %.  Compared prior study of April 13, 2015 small anteroapical defect  previously described as fixed is now appear reversible which may  suggest artifact, LVEF was previously reported at 63%.    Cardiac Imaging:  Carotid duplex 1/6/2025  Right Carotid: <50% stenosis of the right proximal internal carotid artery. Right external carotid artery appears patent with no evidence of stenosis. The right vertebral artery is patent with antegrade flow. No evidence of hemodynamically significant stenosis in the right subclavian artery.  Left Carotid: <50% stenosis of the left proximal internal carotid artery. Left external carotid artery appears patent with no evidence of stenosis. The left vertebral artery is patent with antegrade flow. No evidence of hemodynamically significant stenosis in the left subclavian artery.  Comparison:  Compared with study from 12/21/2022, no significant change.    Carotid artery duplex 12/21/2022  Right Carotid: Findings are consistent with less than 50% stenosis of the right proximal ICA. No evidence of hemodynamically significant stenosis of the right common carotid artery. The right vertebral artery is patent with antegrade flow.  Left Carotid: Findings are consistent with less than 50% stenosis of the left proximal ICA. No evidence of hemodynamically significant stenosis of the left common carotid artery. The left vertebral artery is patent with antegrade flow.  Right Plaque Morph:  The proximal right internal carotid artery demonstrates calcified plaque.  Left Plaque Morph: No plaque identified in the left carotid artery.    I have personally reviewed most recent PCP, cardiology, vascular, studies and/or documentation.      Assessment/Plan   CAD, hx of x2 MI s/p PCI with x8 coronary stents. Non-obstructive coronary artery disease noted on [facility] 3/18/2023.  Patient had significant chest pain and was evaluated in the ED and underwent repeat left heart cath which revealed patent prior stents with no residual obstructive coronary artery disease (4/17/2025). He denies any further complaints of chest pain at today's visit whatsoever. Continue isosorbide 60 mg daily, Plavix, aspirin, and high intensity statin therapy.    Prinzmetal variant angina, he does report occasional symptoms of angina, but reports this is chronic and is not new.  Cardiac catheterization 2023 showed non-obstructive coronary artery disease and repeat heart cath this April revealed patent prior stents with no residual obstructive coronary disease. Continue isosorbide 60 mg daily.    HTN, soft, BP 94/54 today. He is on losartan 50 mg daily. Will decrease losartan to 25mg daily. Goal BP <130/80.  He will have follow-up visit with his PCP next month who will check his blood pressure at that time.    HLD, 3/17/2025 LDL 47, HDL 61, triglycerides 77. He is on atorvastatin 80 mg daily.  Goal LDL <55.  Excellent control.  Repeat fasting blood work can be done with his PCP.    Carotid arthrosclerosis, right internal coronary artery demonstrated calcified plaque on carotid duplex and 50% stenosis of bilateral ICA (12/21/2022). Repeat carotid duplex revealed <50% stenosis of bilateral ICA and no significant changes from prior study (1/6/2025).  Patient remains asymptomatic.  Continue aspirin, Plavix, and high intensity statin therapy.    Chronic back pain, he is s/p 1/10/2024 [employer] spinal cord stimulator leads and does follow-up with pain  management.    Follow-up with me in 6 months.    Coreen Byers, MICHAEL-CNP

## 2025-07-04 DIAGNOSIS — R11.0 NAUSEA: ICD-10-CM

## 2025-07-07 RX ORDER — PROMETHAZINE HYDROCHLORIDE 25 MG/1
25 TABLET ORAL EVERY 6 HOURS PRN
Qty: 90 TABLET | Refills: 1 | Status: SHIPPED | OUTPATIENT
Start: 2025-07-07

## 2025-07-08 DIAGNOSIS — Z96.89 SPINAL CORD STIMULATOR STATUS: ICD-10-CM

## 2025-07-08 DIAGNOSIS — M54.42 CHRONIC BILATERAL LOW BACK PAIN WITH LEFT-SIDED SCIATICA: ICD-10-CM

## 2025-07-08 DIAGNOSIS — G89.29 CHRONIC BILATERAL LOW BACK PAIN WITH LEFT-SIDED SCIATICA: ICD-10-CM

## 2025-07-08 DIAGNOSIS — G89.0 CENTRAL PAIN SYNDROME: Primary | ICD-10-CM

## 2025-07-08 RX ORDER — KETOROLAC TROMETHAMINE 30 MG/ML
30 INJECTION, SOLUTION INTRAMUSCULAR; INTRAVENOUS ONCE
Status: CANCELLED | OUTPATIENT
Start: 2025-07-11 | End: 2025-07-11

## 2025-07-08 RX ORDER — METOCLOPRAMIDE HYDROCHLORIDE 5 MG/ML
10 INJECTION INTRAMUSCULAR; INTRAVENOUS ONCE
Status: CANCELLED | OUTPATIENT
Start: 2025-07-11 | End: 2025-07-11

## 2025-07-08 RX ORDER — ONDANSETRON HYDROCHLORIDE 2 MG/ML
4 INJECTION, SOLUTION INTRAVENOUS ONCE
Status: CANCELLED | OUTPATIENT
Start: 2025-07-11 | End: 2025-07-11

## 2025-07-08 RX ORDER — NITROGLYCERIN 0.4 MG/1
0.4 TABLET SUBLINGUAL ONCE
Status: CANCELLED | OUTPATIENT
Start: 2025-07-11 | End: 2025-07-11

## 2025-07-08 RX ORDER — METOPROLOL TARTRATE 25 MG/1
25 TABLET, FILM COATED ORAL ONCE
Status: CANCELLED | OUTPATIENT
Start: 2025-07-11 | End: 2025-07-11

## 2025-07-08 RX ORDER — DIPHENHYDRAMINE HYDROCHLORIDE 50 MG/ML
25 INJECTION, SOLUTION INTRAMUSCULAR; INTRAVENOUS ONCE
Status: CANCELLED | OUTPATIENT
Start: 2025-07-11 | End: 2025-07-11

## 2025-07-11 ENCOUNTER — INFUSION (OUTPATIENT)
Dept: INFUSION THERAPY | Facility: CLINIC | Age: 60
End: 2025-07-11
Payer: MEDICARE

## 2025-07-11 VITALS
HEART RATE: 81 BPM | OXYGEN SATURATION: 94 % | SYSTOLIC BLOOD PRESSURE: 99 MMHG | RESPIRATION RATE: 16 BRPM | DIASTOLIC BLOOD PRESSURE: 61 MMHG | TEMPERATURE: 96.6 F

## 2025-07-11 DIAGNOSIS — G89.29 CHRONIC BILATERAL LOW BACK PAIN WITH LEFT-SIDED SCIATICA: ICD-10-CM

## 2025-07-11 DIAGNOSIS — M54.42 CHRONIC BILATERAL LOW BACK PAIN WITH LEFT-SIDED SCIATICA: ICD-10-CM

## 2025-07-11 DIAGNOSIS — G89.0 CENTRAL PAIN SYNDROME: ICD-10-CM

## 2025-07-11 DIAGNOSIS — Z96.89 SPINAL CORD STIMULATOR STATUS: ICD-10-CM

## 2025-07-11 PROCEDURE — 96365 THER/PROPH/DIAG IV INF INIT: CPT | Mod: INF

## 2025-07-11 PROCEDURE — 96368 THER/DIAG CONCURRENT INF: CPT | Mod: INF

## 2025-07-11 PROCEDURE — 96375 TX/PRO/DX INJ NEW DRUG ADDON: CPT | Mod: INF

## 2025-07-11 PROCEDURE — 2500000004 HC RX 250 GENERAL PHARMACY W/ HCPCS (ALT 636 FOR OP/ED): Performed by: NURSE PRACTITIONER

## 2025-07-11 RX ORDER — KETOROLAC TROMETHAMINE 30 MG/ML
30 INJECTION, SOLUTION INTRAMUSCULAR; INTRAVENOUS ONCE
OUTPATIENT
Start: 2025-08-10 | End: 2025-08-10

## 2025-07-11 RX ORDER — KETOROLAC TROMETHAMINE 30 MG/ML
30 INJECTION, SOLUTION INTRAMUSCULAR; INTRAVENOUS ONCE
Status: COMPLETED | OUTPATIENT
Start: 2025-07-11 | End: 2025-07-11

## 2025-07-11 RX ORDER — METOCLOPRAMIDE HYDROCHLORIDE 5 MG/ML
10 INJECTION INTRAMUSCULAR; INTRAVENOUS ONCE
OUTPATIENT
Start: 2025-08-10 | End: 2025-08-10

## 2025-07-11 RX ORDER — DIPHENHYDRAMINE HYDROCHLORIDE 50 MG/ML
25 INJECTION, SOLUTION INTRAMUSCULAR; INTRAVENOUS ONCE
OUTPATIENT
Start: 2025-08-10 | End: 2025-08-10

## 2025-07-11 RX ORDER — METOPROLOL TARTRATE 25 MG/1
25 TABLET, FILM COATED ORAL ONCE
OUTPATIENT
Start: 2025-08-10 | End: 2025-08-10

## 2025-07-11 RX ORDER — DIPHENHYDRAMINE HYDROCHLORIDE 50 MG/ML
50 INJECTION, SOLUTION INTRAMUSCULAR; INTRAVENOUS AS NEEDED
OUTPATIENT
Start: 2025-08-10

## 2025-07-11 RX ORDER — EPINEPHRINE 0.3 MG/.3ML
0.3 INJECTION SUBCUTANEOUS EVERY 5 MIN PRN
OUTPATIENT
Start: 2025-08-10

## 2025-07-11 RX ORDER — ONDANSETRON HYDROCHLORIDE 2 MG/ML
4 INJECTION, SOLUTION INTRAVENOUS ONCE
OUTPATIENT
Start: 2025-08-10 | End: 2025-08-10

## 2025-07-11 RX ORDER — NITROGLYCERIN 0.4 MG/1
0.4 TABLET SUBLINGUAL ONCE
OUTPATIENT
Start: 2025-08-10 | End: 2025-08-10

## 2025-07-11 RX ORDER — ALBUTEROL SULFATE 0.83 MG/ML
3 SOLUTION RESPIRATORY (INHALATION) AS NEEDED
OUTPATIENT
Start: 2025-08-10

## 2025-07-11 RX ORDER — FAMOTIDINE 10 MG/ML
20 INJECTION, SOLUTION INTRAVENOUS ONCE AS NEEDED
OUTPATIENT
Start: 2025-08-10

## 2025-07-11 RX ADMIN — Medication: at 10:11

## 2025-07-11 RX ADMIN — PROPOFOL 100 MG: 10 INJECTION, EMULSION INTRAVENOUS at 10:11

## 2025-07-11 RX ADMIN — KETOROLAC TROMETHAMINE 30 MG: 30 INJECTION, SOLUTION INTRAMUSCULAR at 10:11

## 2025-07-11 ASSESSMENT — ENCOUNTER SYMPTOMS
DEPRESSION: 0
OCCASIONAL FEELINGS OF UNSTEADINESS: 0
LOSS OF SENSATION IN FEET: 1

## 2025-07-11 ASSESSMENT — PAIN SCALES - GENERAL
PAINLEVEL_OUTOF10: 2
PAINLEVEL_OUTOF10: 7

## 2025-07-11 ASSESSMENT — PAIN - FUNCTIONAL ASSESSMENT
PAIN_FUNCTIONAL_ASSESSMENT: 0-10
PAIN_FUNCTIONAL_ASSESSMENT: 0-10

## 2025-07-11 ASSESSMENT — PAIN DESCRIPTION - DESCRIPTORS: DESCRIPTORS: THROBBING

## 2025-07-11 NOTE — PROGRESS NOTES
S: Patient here for 24th opioid sparing pain infusion. Patient reports 80% reduction in pain after last infusion that lasted 16 days.    Purpose of pain infusion meds explained along with potential side effects.  Patient verbalized understanding.    B: Pain Issues are 7/10 lower back pain.     Is Patient receiving any other form Ketamine from any other facility/ outside clinic ?: no    A: Patient currently has pain described on flow sheet documentation. Designated  is Paulo @[phone]. Patient last ate solid food 14 hours ago, and had liquid 2 hours ago.    R: Plan; Obtain IV access, do patient risk assessment, and start opioid sparing infusion as ordered. Monitoring for S/S of adverse reactions.    Post infusion teaching provided. Patient verbalized understanding. VSS, Patient states pain is 2/10. Will assist patient to waiting car via wheelchair.

## 2025-07-11 NOTE — PATIENT INSTRUCTIONS
Today :We administered (ketamine 30 mg, lidocaine (Xylocaine) 300 mg in sodium chloride 0.9% 518 mL IV), propofol (Diprivan), and ketorolac.     For:   1. Central pain syndrome    2. Chronic bilateral low back pain with left-sided sciatica    3. Spinal cord stimulator status         Your next appointment is due in:  1 month        Please read the  Medication Guide that was given to you and reviewed during todays visit.     (Tell all doctors including dentists that you are taking this medication)     Go to the emergency room or call 911 if:  -You have signs of allergic reaction:   -Rash, hives, itching.   -Swollen, blistered, peeling skin.   -Swelling of face, lips, mouth, tongue or throat.   -Tightness of chest, trouble breathing, swallowing or talking     Call your doctor:  - If IV / injection site gets red, warm, swollen, itchy or leaks fluid or pus.     (Leave dressing on your IV site for at least 2 hours and keep area clean and dry  - If you get sick or have symptoms of infection or are not feeling well for any reason.    (Wash your hands often, stay away from people who are sick)  - If you have side effects from your medication that do not go away or are bothersome.     (Refer to the teaching your nurse gave you for side effects to call your doctor about)    - Common side effects may include:  stuffy nose, headache, feeling tired, muscle aches, upset stomach  - Before receiving any vaccines     - Call the [facility] at   If:  - You get sick, are on antibiotics, have had a recent vaccine, have surgery or dental work and your doctor wants your visit rescheduled.  - You need to cancel and reschedule your visit for any reason. Call at least 2 days before your visit if you need to cancel.   - Your insurance changes before your next visit.    (We will need to get approval from your new insurance. This can take up to two weeks.)     The [facility] is opened Monday thru Friday. We are  closed on weekends and holidays.   Voice mail will take your call if the center is closed. If you leave a message please allow 24 hours for a call back during weekdays. If you leave a message on a weekend/holiday, we will call you back the next business day.    A pharmacist is available Monday - Friday from 8:30AM to 3:30PM to help answer any questions you may have about your prescriptions(s). Please call pharmacy at:    [facility]: [phone]  [facility]: [phone]  [facility]: [phone]              [facility]      Pain Infusion Aftercare Instructions      1. It is normal to feel sedated, tired and low in energy after a pain infusion. DO NOT DRIVE, OPERATE ANY MACHINERY, OR MAKE ANY IMPORTANT DECISIONS FOR AT LEAST 24 HOURS AFTER THE INFUSION.     2. Call the pain center at [phone] with any problems, questions, or concerns.     3. Eat light after the infusion. If you feel queasy or sick to your stomach, laying down with your eyes closed may help. When you resume eating start with something mild like clear liquids, yogurt, applesauce, crackers, etc… Gradually advance to a regular diet.     4. Do not leave your house alone the evening of your pain infusion.     5. No alcohol or sedative medications, such as sleeping pills, for 24 hours after your pain infusion.     6. Resume all other prescribed medications unless directed otherwise by you physician.     7. If you have any medical emergencies, call 911 or go directly to the closest emergency room.    Patient Instructions  IV Infusion   [facility]      1. A responsible adult must stay with you during your infusion and drive you home. If you do not have a responsible adult with transportation home, your appointment will be rescheduled. Patients must still have a responsible adult if they use Rideshare, Uber, or Lyft. Uber, Ridesh[employer], or Lyft drivers do not qualify.   2. On the  day of your infusion we ask that you please drink clear liquids until your appointment.  You should NOT eat food 4 hours before your infusion.    3. Take all medications as prescribed before your infusion. Do not withhold blood pressure medication.   4. Patients who use a CPAP or BIPAP should bring it to the infusion appointment.   5. Children under 16 will not be permitted in the infusion clinic. Please do not bring young children or pets. For patients who must bring a service animal, paperwork will be required. NO EXCEPTIONS.  6.  All Women will be required to take a pregnancy test prior to the infusion unless they are above 55 years of age or have had a hysterectomy.   7. Patients who cancel their infusion should call the Infusion line at [phone] as soon as possible. We would appreciate a 48-hour notice. Please be on time for the appt. Patients who are more than 15 mins late will have to cancel and reschedule. Infusion appt times are minimal. Patients who do not show, cancel, or reschedule an appointment may have a long wait until we can get them back on the schedule.   8. We make every effort to schedule your infusions based on your physician's recommendations. However, factors will affect our infusion schedule and availability. We appreciate your understanding.  9. Appointments will only be scheduled for two appointments in the future.   10. No eating, drinking, or chewing gum during the infusion.   11. If you miss or cancel your infusion appointment it is YOUR responsibility to call us and reschedule.  Please call [phone] or [phone] to reschedule or cancel appointment

## 2025-07-21 ENCOUNTER — APPOINTMENT (OUTPATIENT)
Dept: PRIMARY CARE | Facility: CLINIC | Age: 60
End: 2025-07-21
Payer: COMMERCIAL

## 2025-07-21 DIAGNOSIS — G89.4 CHRONIC PAIN SYNDROME: ICD-10-CM

## 2025-07-22 RX ORDER — PREGABALIN 50 MG/1
50 CAPSULE ORAL 3 TIMES DAILY
Qty: 90 CAPSULE | Refills: 0 | Status: SHIPPED | OUTPATIENT
Start: 2025-07-22

## 2025-07-28 ENCOUNTER — APPOINTMENT (OUTPATIENT)
Dept: PRIMARY CARE | Facility: CLINIC | Age: 60
End: 2025-07-28
Payer: COMMERCIAL

## 2025-07-28 VITALS
HEIGHT: 68 IN | WEIGHT: 196.2 LBS | RESPIRATION RATE: 16 BRPM | SYSTOLIC BLOOD PRESSURE: 107 MMHG | HEART RATE: 86 BPM | BODY MASS INDEX: 29.73 KG/M2 | DIASTOLIC BLOOD PRESSURE: 70 MMHG

## 2025-07-28 DIAGNOSIS — K21.9 GASTROESOPHAGEAL REFLUX DISEASE, UNSPECIFIED WHETHER ESOPHAGITIS PRESENT: ICD-10-CM

## 2025-07-28 DIAGNOSIS — E78.2 MIXED HYPERLIPIDEMIA: Primary | ICD-10-CM

## 2025-07-28 DIAGNOSIS — I25.118 CORONARY ARTERY DISEASE OF NATIVE ARTERY OF NATIVE HEART WITH STABLE ANGINA PECTORIS: ICD-10-CM

## 2025-07-28 DIAGNOSIS — E11.649 TYPE 2 DIABETES MELLITUS WITH HYPOGLYCEMIA WITHOUT COMA, WITHOUT LONG-TERM CURRENT USE OF INSULIN: ICD-10-CM

## 2025-07-28 DIAGNOSIS — I10 ESSENTIAL HYPERTENSION: ICD-10-CM

## 2025-07-28 LAB — POC HEMOGLOBIN A1C: 5.7 % (ref 4.2–6.5)

## 2025-07-28 PROCEDURE — 4010F ACE/ARB THERAPY RXD/TAKEN: CPT | Performed by: INTERNAL MEDICINE

## 2025-07-28 PROCEDURE — 3078F DIAST BP <80 MM HG: CPT | Performed by: INTERNAL MEDICINE

## 2025-07-28 PROCEDURE — 3008F BODY MASS INDEX DOCD: CPT | Performed by: INTERNAL MEDICINE

## 2025-07-28 PROCEDURE — 83036 HEMOGLOBIN GLYCOSYLATED A1C: CPT | Performed by: INTERNAL MEDICINE

## 2025-07-28 PROCEDURE — 3074F SYST BP LT 130 MM HG: CPT | Performed by: INTERNAL MEDICINE

## 2025-07-28 PROCEDURE — 3044F HG A1C LEVEL LT 7.0%: CPT | Performed by: INTERNAL MEDICINE

## 2025-07-28 PROCEDURE — 1036F TOBACCO NON-USER: CPT | Performed by: INTERNAL MEDICINE

## 2025-07-28 PROCEDURE — 99214 OFFICE O/P EST MOD 30 MIN: CPT | Performed by: INTERNAL MEDICINE

## 2025-07-28 ASSESSMENT — ENCOUNTER SYMPTOMS
CONSTIPATION: 0
DIARRHEA: 0
SHORTNESS OF BREATH: 0

## 2025-07-28 ASSESSMENT — PAIN SCALES - GENERAL: PAINLEVEL_OUTOF10: 0-NO PAIN

## 2025-07-28 NOTE — ASSESSMENT & PLAN NOTE
Remains on clopidogrel 75 mg daily and aspirin 325 daily.   Also remains on atorvastatin 80 and has as needed nitroglycerin

## 2025-07-28 NOTE — PROGRESS NOTES
Subjective   Patient ID: Logan Campos is a 59 y.o. male who presents for follow up.    HPI     Patient presents today for a follow-up    He brings forms with him today- he states that when he was stationed as [employer] in the 80's he was unknowingly surrounded by ground chemicals and they are now finding people in this specific time frame are suffering from cardiac/lung issues.     Continues to have ringing in his ears- following with ENT trying to find a hearing aid that can diminish his tinnitus.     Checking his blood sugars in the morning only 80's-100's.     Occasional chest pain, states it does not happen often. Following with cardiology.  He was in the hospital in April for this.     Continues to follow with pain management.     Denies SOB, constipation, diarrhea.     Microalbumin/cr ratio 03/2024: 5.5     Latest Reference Range & Units 04/16/25 16:20 04/17/25 07:15 05/23/25 10:03   GLUCOSE 74 - 99 mg/dL 59 (L) 100 (H)    SODIUM 136 - 145 mmol/L 142 143    POTASSIUM 3.5 - 5.3 mmol/L 3.7 4.5    CHLORIDE 98 - 107 mmol/L 107 109 (H)    Bicarbonate 21 - 32 mmol/L 27 29    Anion Gap 10 - 20 mmol/L 12 10    Blood Urea Nitrogen 6 - 23 mg/dL 14 12    Creatinine 0.50 - 1.30 mg/dL 1.28 1.25    EGFR >60 mL/min/1.73m*2 65 67    Calcium 8.6 - 10.3 mg/dL 9.1 8.8    PHOSPHORUS 2.5 - 4.9 mg/dL  3.2    Albumin 3.4 - 5.0 g/dL 4.6 4.1    Alkaline Phosphatase 33 - 120 U/L 68     ALT 10 - 52 U/L 15     AST 9 - 39 U/L 24     Bilirubin Total 0.0 - 1.2 mg/dL 0.4     Total Protein 6.4 - 8.2 g/dL 6.9     MAGNESIUM 1.60 - 2.40 mg/dL 2.41 (H) 2.32    BNP 0 - 99 pg/mL 56     Troponin I, High Sensitivity 0 - 20 ng/L 3     WBC 4.4 - 11.3 x10*3/uL 8.0 8.0    nRBC 0.0 - 0.0 /100 WBCs 0.0 0.0    RBC 4.50 - 5.90 x10*6/uL 4.56 4.52    HEMOGLOBIN 13.5 - 17.5 g/dL 13.8 13.7    HEMATOCRIT 41.0 - 52.0 % 42.9 42.4    MCV 80 - 100 fL 94 94    MCH 26.0 - 34.0 pg 30.3 30.3    MCHC 32.0 - 36.0 g/dL 32.2 32.3    RED CELL DISTRIBUTION WIDTH 11.5 -  14.5 % 12.5 12.5    Platelets 150 - 450 x10*3/uL 226 201    Neutrophils % 40.0 - 80.0 % 61.0     Immature Granulocytes %, Automated 0.0 - 0.9 % 0.4     Lymphocytes % 13.0 - 44.0 % 29.8     Monocytes % 2.0 - 10.0 % 6.7     Eosinophils % 0.0 - 6.0 % 1.5     Basophils % 0.0 - 2.0 % 0.6     Neutrophils Absolute 1.20 - 7.70 x10*3/uL 4.88     Immature Granulocytes Absolute, Automated 0.00 - 0.70 x10*3/uL 0.03     Lymphocytes Absolute 1.20 - 4.80 x10*3/uL 2.39     Monocytes Absolute 0.10 - 1.00 x10*3/uL 0.54     Eosinophils Absolute 0.00 - 0.70 x10*3/uL 0.12     Basophils Absolute 0.00 - 0.10 x10*3/uL 0.05     Notes and Comments    COMMENT ONLY   Amphetamines <500 ng/mL   NEGATIVE   Barbiturates <300 ng/mL   NEGATIVE   Benzodiazepines <100 ng/mL   NEGATIVE   Cocaine Metabolite <150 ng/mL   NEGATIVE   Marijuana Metabolite <20 ng/mL   POSITIVE !   Fentanyl <0.5 ng/mL   NEGATIVE   Methadone <100 ng/mL   NEGATIVE   Opiates <100 ng/mL   NEGATIVE CONFIRMED   Oxycodone <100 ng/mL   NEGATIVE   Phencyclidine <25 ng/mL   NEGATIVE   MARIJUANA METABOLITE, QUANTITATIVE, URINE <5 ng/mL   112 (H)   Marijuana Comments    COMMENT ONLY   Codeine <50 ng/mL   NEGATIVE   Hydrocodone <50 ng/mL   NEGATIVE   Hydromorphone <50 ng/mL   NEGATIVE   Morphine <50 ng/mL   NEGATIVE   Norhydrocodone <50 ng/mL   NEGATIVE   Opiates Comments    COMMENT ONLY   Oxidant <200 mcg/mL   NEGATIVE   CREATININE, RANDOM URINE > or = 20.0 mg/dL   9.2 (L)   Pregabalin <1000 ng/mL   NEGATIVE   Pregabalin Comments    COMMENT ONLY   SPECIFIC GRAVITY > or = 1.003    1.003   PH 4.5 - 9.0    7.0   (L): Data is abnormally low  (H): Data is abnormally high  !: Data is abnormal    Review of Systems   HENT:  Positive for tinnitus.    Respiratory:  Negative for shortness of breath.    Cardiovascular:  Positive for chest pain.   Gastrointestinal:  Negative for constipation and diarrhea.       Current Medications[1]    Objective   /70   Pulse 86   Resp 16   Ht 1.727 m (5'  "8\")   Wt 89 kg (196 lb 3.2 oz)   BMI 29.83 kg/m²     Physical Exam  Constitutional:       Appearance: Normal appearance.   HENT:      Mouth/Throat:      Mouth: Mucous membranes are moist.      Pharynx: Oropharynx is clear.     Eyes:      Conjunctiva/sclera: Conjunctivae normal.      Pupils: Pupils are equal, round, and reactive to light.       Cardiovascular:      Rate and Rhythm: Normal rate and regular rhythm.      Heart sounds: Normal heart sounds.   Pulmonary:      Effort: Pulmonary effort is normal.      Breath sounds: Normal breath sounds.   Abdominal:      General: Bowel sounds are normal. There is no distension.      Palpations: Abdomen is soft. There is no mass.      Tenderness: There is no abdominal tenderness.   Lymphadenopathy:      Cervical: No cervical adenopathy.     Skin:     General: Skin is warm and dry.     Neurological:      General: No focal deficit present.         Assessment/Plan   Problem List Items Addressed This Visit       CAD (coronary artery disease)    Remains on clopidogrel 75 mg daily and aspirin 325 daily.   Also remains on atorvastatin 80 and has as needed nitroglycerin         Relevant Medications    tirzepatide 5 mg/0.5 mL pen injector    Gastroesophageal reflux disease    Remain on Pantoprazole 40 mg daily          Type 2 diabetes mellitus    A1c 5.7  Remains on Mounjaro at 5 mg weekly and also Xigduo 5/1000 mg daily.  Labs ordered today.             Relevant Medications    tirzepatide 5 mg/0.5 mL pen injector    Other Relevant Orders    POCT glycosylated hemoglobin (Hb A1C) manually resulted (Completed)    Albumin-Creatinine Ratio, Urine Random    Hemoglobin A1C    Basic metabolic panel    Mixed hyperlipidemia - Primary    Remain on Atorvastatin 80 mg daily          Essential hypertension    Remain on Losartan 50 mg daily and isosorbide 60 mg daily          Relevant Orders    Basic metabolic panel           Please return to see me in 6 months for a 30 minute follow up. "     Scribe Attestation  By signing my name below, I, Bruce Salazar   attest that this documentation has been prepared under the direction and in the presence of Magda Gilman DO.           [1]   Current Outpatient Medications:     tirzepatide 5 mg/0.5 mL pen injector, Inject 5 mg under the skin every 7 days., Disp: , Rfl:     aspirin 325 mg tablet, Take 1 tablet (325 mg) by mouth once daily at bedtime., Disp: , Rfl:     atorvastatin (Lipitor) 80 mg tablet, Take 1 tablet (80 mg) by mouth once daily., Disp: 90 tablet, Rfl: 3    cholecalciferol (Vitamin D-3) 5,000 Units tablet, Take 1 tablet (125 mcg) by mouth once daily., Disp: , Rfl:     chrm/vineg/bit-orang peel/gr t (APPLE CIDER VINEGAR PLUS ORAL), Take 2 capsules by mouth once daily., Disp: , Rfl:     clopidogrel (Plavix) 75 mg tablet, Take 1 tablet (75 mg) by mouth once daily., Disp: 90 tablet, Rfl: 3    dapaglifloz propaned-metformin (Xigduo XR) 5-1,000 mg, Take 1 tablet by mouth once daily. Take with food., Disp: 90 tablet, Rfl: 3    DULoxetine (Cymbalta) 60 mg DR capsule, TAKE 1 CAPSULE BY MOUTH ONCE DAILY., Disp: 90 capsule, Rfl: 3    EPINEPHrine (EpiPen) 0.3 mg/0.3 mL injection syringe, Inject 0.3 mL (0.3 mg) into the muscle 1 time if needed for anaphylaxis. Inject into upper leg. Call 911 after use., Disp: , Rfl:     fish oil concentrate (Omega-3) 120-180 mg capsule, Take 6 capsules (6,000 mg) by mouth once daily in the morning., Disp: , Rfl:     hydrOXYzine HCL (Atarax) 25 mg tablet, Take 1-2 tablets (25-50 mg) by mouth as needed at bedtime (insomnia)., Disp: , Rfl:     hydrOXYzine pamoate (Vistaril) 50 mg capsule, Take 1 capsule (50 mg) by mouth 1 time., Disp: , Rfl:     isosorbide mononitrate ER (Imdur) 30 mg 24 hr tablet, Take 2 tablets (60 mg) by mouth once daily., Disp: 180 tablet, Rfl: 3    ketamine HCl (ketamine, bulk,) 100 % powder, Ketamine 100 mg/mL + lidocaine 40 mg/mL 1 puff 4 times daily as needed, DSP#20 mL x 5 refills, Disp: 20 g,  Rfl: 5    lidocaine HCl, bulk, 100 % powder powder, In ketamine nasal spray, Disp: , Rfl:     losartan (Cozaar) 25 mg tablet, Take 1 tablet (25 mg) by mouth once daily., Disp: 90 tablet, Rfl: 3    Myrbetriq 50 mg tablet extended release 24 hr 24 hr tablet, Take 1 tablet (50 mg) by mouth once daily., Disp: 90 tablet, Rfl: 3    nitroglycerin (Nitrostat) 0.4 mg SL tablet, Place 1 tablet (0.4 mg) under the tongue every 5 minutes if needed for chest pain., Disp: , Rfl:     NON FORMULARY, Alive veggie based vitamins, Disp: , Rfl:     NON FORMULARY, Medical Marijuana, Disp: , Rfl:     pantoprazole (ProtoNix) 40 mg EC tablet, Take 1 tablet (40 mg) by mouth once daily. Do not crush, chew, or split., Disp: 90 tablet, Rfl: 3    pregabalin (Lyrica) 50 mg capsule, TAKE 1 CAPSULE (50 MG) BY MOUTH 3 TIMES A DAY., Disp: 90 capsule, Rfl: 0    promethazine (Phenergan) 25 mg tablet, TAKE 1 TABLET (25 MG) BY MOUTH EVERY 6 HOURS IF NEEDED FOR NAUSEA., Disp: 90 tablet, Rfl: 1    rOPINIRole (Requip) 1 mg tablet, TAKE 1 TABLET (1 MG) BY MOUTH ONCE DAILY AT BEDTIME., Disp: 90 tablet, Rfl: 3    sertraline (Zoloft) 100 mg tablet, Take 1 tablet (100 mg) by mouth once daily in the morning. Take with 50 mg tab for total daily dose 150 mg, Disp: , Rfl:     sertraline (Zoloft) 50 mg tablet, Take 1 tablet (50 mg) by mouth once daily in the morning. Take with 100 mg tab for total daily dose 150 mg, Disp: , Rfl:     tamsulosin (Flomax) 0.4 mg 24 hr capsule, Take 1 capsule (0.4 mg) by mouth once daily., Disp: 90 capsule, Rfl: 3    Tiadylt  mg 24 hr capsule, Take 1 capsule (360 mg) by mouth once daily., Disp: 90 capsule, Rfl: 3    tiZANidine (Zanaflex) 4 mg tablet, TAKE 1 TABLET (4 MG) BY MOUTH 3 TIMES A DAY AS NEEDED FOR MUSCLE SPASMS., Disp: 90 tablet, Rfl: 11    traZODone (Desyrel) 100 mg tablet, Take 1 tablet (100 mg) by mouth once daily at bedtime., Disp: 90 tablet, Rfl: 3    trospium (Sanctura XR) 60 mg 24 hour capsule, Take 1 capsule (60  mg) by mouth once daily., Disp: 90 capsule, Rfl: 3    VITAMIN B COMPLEX ORAL, Take 1 tablet by mouth once daily in the morning., Disp: , Rfl:

## 2025-07-28 NOTE — PATIENT INSTRUCTIONS
See me again in 6 months for 30 min visit.     Get fasting blood test and urine test one week before next visit.

## 2025-07-28 NOTE — ASSESSMENT & PLAN NOTE
A1c 5.7  Remains on Mounjaro at 5 mg weekly and also Xigduo 5/1000 mg daily.  Labs ordered today.

## 2025-07-31 ENCOUNTER — TELEPHONE (OUTPATIENT)
Dept: PRIMARY CARE | Facility: CLINIC | Age: 60
End: 2025-07-31
Payer: COMMERCIAL

## 2025-07-31 DIAGNOSIS — E11.65 TYPE 2 DIABETES MELLITUS WITH HYPERGLYCEMIA, WITHOUT LONG-TERM CURRENT USE OF INSULIN: ICD-10-CM

## 2025-07-31 NOTE — TELEPHONE ENCOUNTER
Patient left message on phone asking why his medication-  Mounjaro  7.5 mg/0.5 ml  pen injector has been discontinued. Would like to hear back from office in regards to this.  [phone]

## 2025-08-01 ENCOUNTER — TELEPHONE (OUTPATIENT)
Dept: PRIMARY CARE | Facility: CLINIC | Age: 60
End: 2025-08-01

## 2025-08-01 ENCOUNTER — APPOINTMENT (OUTPATIENT)
Dept: AUDIOLOGY | Facility: CLINIC | Age: 60
End: 2025-08-01
Payer: COMMERCIAL

## 2025-08-01 RX ORDER — TIRZEPATIDE 7.5 MG/.5ML
7.5 INJECTION, SOLUTION SUBCUTANEOUS
Qty: 6 ML | Refills: 4 | Status: SHIPPED | OUTPATIENT
Start: 2025-08-01

## 2025-08-01 NOTE — TELEPHONE ENCOUNTER
Patient called states he was return a call    He said yes he do need the Mounjaro 7.5 and that it would go to  [facility]      I did not see it on his meds list to select    Pleas advise

## 2025-08-07 DIAGNOSIS — Z96.89 SPINAL CORD STIMULATOR STATUS: ICD-10-CM

## 2025-08-07 DIAGNOSIS — G89.0 CENTRAL PAIN SYNDROME: Primary | ICD-10-CM

## 2025-08-07 DIAGNOSIS — M54.42 CHRONIC BILATERAL LOW BACK PAIN WITH LEFT-SIDED SCIATICA: ICD-10-CM

## 2025-08-07 DIAGNOSIS — G89.29 CHRONIC BILATERAL LOW BACK PAIN WITH LEFT-SIDED SCIATICA: ICD-10-CM

## 2025-08-07 RX ORDER — KETOROLAC TROMETHAMINE 30 MG/ML
30 INJECTION, SOLUTION INTRAMUSCULAR; INTRAVENOUS ONCE
OUTPATIENT
Start: 2025-08-15 | End: 2025-08-15

## 2025-08-15 ENCOUNTER — INFUSION (OUTPATIENT)
Dept: INFUSION THERAPY | Facility: CLINIC | Age: 60
End: 2025-08-15
Payer: COMMERCIAL

## 2025-08-15 VITALS
DIASTOLIC BLOOD PRESSURE: 64 MMHG | OXYGEN SATURATION: 95 % | TEMPERATURE: 97.3 F | RESPIRATION RATE: 14 BRPM | SYSTOLIC BLOOD PRESSURE: 114 MMHG | HEART RATE: 86 BPM

## 2025-08-15 DIAGNOSIS — M54.42 CHRONIC BILATERAL LOW BACK PAIN WITH LEFT-SIDED SCIATICA: ICD-10-CM

## 2025-08-15 DIAGNOSIS — G89.0 CENTRAL PAIN SYNDROME: ICD-10-CM

## 2025-08-15 DIAGNOSIS — Z96.89 SPINAL CORD STIMULATOR STATUS: ICD-10-CM

## 2025-08-15 DIAGNOSIS — G89.29 CHRONIC BILATERAL LOW BACK PAIN WITH LEFT-SIDED SCIATICA: ICD-10-CM

## 2025-08-15 DIAGNOSIS — R11.0 NAUSEA: ICD-10-CM

## 2025-08-15 PROCEDURE — 96365 THER/PROPH/DIAG IV INF INIT: CPT | Mod: INF

## 2025-08-15 PROCEDURE — 96375 TX/PRO/DX INJ NEW DRUG ADDON: CPT | Mod: INF

## 2025-08-15 PROCEDURE — 2500000004 HC RX 250 GENERAL PHARMACY W/ HCPCS (ALT 636 FOR OP/ED): Mod: JZ | Performed by: NURSE PRACTITIONER

## 2025-08-15 PROCEDURE — 96368 THER/DIAG CONCURRENT INF: CPT | Mod: INF

## 2025-08-15 RX ORDER — KETOROLAC TROMETHAMINE 30 MG/ML
30 INJECTION, SOLUTION INTRAMUSCULAR; INTRAVENOUS ONCE
Status: COMPLETED | OUTPATIENT
Start: 2025-08-15 | End: 2025-08-15

## 2025-08-15 RX ORDER — KETOROLAC TROMETHAMINE 30 MG/ML
30 INJECTION, SOLUTION INTRAMUSCULAR; INTRAVENOUS ONCE
OUTPATIENT
Start: 2025-09-14 | End: 2025-09-14

## 2025-08-15 RX ORDER — FAMOTIDINE 10 MG/ML
20 INJECTION, SOLUTION INTRAVENOUS ONCE AS NEEDED
OUTPATIENT
Start: 2025-09-14

## 2025-08-15 RX ORDER — METOPROLOL TARTRATE 25 MG/1
25 TABLET, FILM COATED ORAL ONCE
OUTPATIENT
Start: 2025-09-14 | End: 2025-09-14

## 2025-08-15 RX ORDER — METOCLOPRAMIDE HYDROCHLORIDE 5 MG/ML
10 INJECTION INTRAMUSCULAR; INTRAVENOUS ONCE
OUTPATIENT
Start: 2025-09-14 | End: 2025-09-14

## 2025-08-15 RX ORDER — DIPHENHYDRAMINE HYDROCHLORIDE 50 MG/ML
50 INJECTION, SOLUTION INTRAMUSCULAR; INTRAVENOUS AS NEEDED
OUTPATIENT
Start: 2025-09-14

## 2025-08-15 RX ORDER — ONDANSETRON HYDROCHLORIDE 2 MG/ML
4 INJECTION, SOLUTION INTRAVENOUS ONCE
OUTPATIENT
Start: 2025-09-14 | End: 2025-09-14

## 2025-08-15 RX ORDER — NITROGLYCERIN 0.4 MG/1
0.4 TABLET SUBLINGUAL ONCE
OUTPATIENT
Start: 2025-09-14 | End: 2025-09-14

## 2025-08-15 RX ORDER — EPINEPHRINE 0.3 MG/.3ML
0.3 INJECTION SUBCUTANEOUS EVERY 5 MIN PRN
OUTPATIENT
Start: 2025-09-14

## 2025-08-15 RX ORDER — ALBUTEROL SULFATE 0.83 MG/ML
3 SOLUTION RESPIRATORY (INHALATION) AS NEEDED
OUTPATIENT
Start: 2025-09-14

## 2025-08-15 RX ORDER — PROMETHAZINE HYDROCHLORIDE 25 MG/1
25 TABLET ORAL EVERY 6 HOURS PRN
Qty: 90 TABLET | Refills: 1 | Status: SHIPPED | OUTPATIENT
Start: 2025-08-15

## 2025-08-15 RX ORDER — DIPHENHYDRAMINE HYDROCHLORIDE 50 MG/ML
25 INJECTION, SOLUTION INTRAMUSCULAR; INTRAVENOUS ONCE
OUTPATIENT
Start: 2025-09-14 | End: 2025-09-14

## 2025-08-15 RX ADMIN — KETOROLAC TROMETHAMINE 30 MG: 30 INJECTION, SOLUTION INTRAMUSCULAR at 09:06

## 2025-08-15 RX ADMIN — Medication: at 09:06

## 2025-08-15 RX ADMIN — PROPOFOL 100 MG: 10 INJECTION, EMULSION INTRAVENOUS at 09:06

## 2025-08-15 ASSESSMENT — ENCOUNTER SYMPTOMS
OCCASIONAL FEELINGS OF UNSTEADINESS: 0
DEPRESSION: 0
LOSS OF SENSATION IN FEET: 1

## 2025-08-15 ASSESSMENT — PAIN SCALES - GENERAL
PAINLEVEL_OUTOF10: 2
PAINLEVEL_OUTOF10: 7
PAINLEVEL_OUTOF10: 7

## 2025-08-25 DIAGNOSIS — G89.4 CHRONIC PAIN SYNDROME: ICD-10-CM

## 2025-08-26 RX ORDER — PREGABALIN 50 MG/1
50 CAPSULE ORAL 3 TIMES DAILY
Qty: 90 CAPSULE | Refills: 0 | Status: SHIPPED | OUTPATIENT
Start: 2025-08-26

## 2025-11-05 ENCOUNTER — APPOINTMENT (OUTPATIENT)
Dept: PHARMACY | Facility: HOSPITAL | Age: 60
End: 2025-11-05
Payer: COMMERCIAL

## 2025-12-08 ENCOUNTER — APPOINTMENT (OUTPATIENT)
Dept: UROLOGY | Facility: CLINIC | Age: 60
End: 2025-12-08
Payer: COMMERCIAL

## 2026-01-26 ENCOUNTER — APPOINTMENT (OUTPATIENT)
Dept: PRIMARY CARE | Facility: CLINIC | Age: 61
End: 2026-01-26
Payer: COMMERCIAL

## 2026-06-02 ENCOUNTER — APPOINTMENT (OUTPATIENT)
Dept: AUDIOLOGY | Facility: CLINIC | Age: 61
End: 2026-06-02
Payer: COMMERCIAL

## 2026-06-02 ENCOUNTER — APPOINTMENT (OUTPATIENT)
Dept: OTOLARYNGOLOGY | Facility: CLINIC | Age: 61
End: 2026-06-02
Payer: COMMERCIAL

## (undated) DEVICE — Device

## (undated) DEVICE — OINTMENT, BACITRACIN, W/ZINC, 1 OZ

## (undated) DEVICE — APPLICATOR, CHLORAPREP, W/ORANGE TINT, 26ML

## (undated) DEVICE — DRAPE, INCISE, ANTIMICROBIAL, IOBAN 2, LARGE, 17 X 23 IN, DISPOSABLE, STERILE

## (undated) DEVICE — DRAPE, SHEET, 17 X 23 IN

## (undated) DEVICE — BAG, BANDED, 36IN X 28IN

## (undated) DEVICE — CARE KIT, PATIENT, LPV ARM CRADLE RWF, WILSON FRAME

## (undated) DEVICE — GUIDEWIRE, INQWIRE, 3MM J, .035 X 210CM, FIXED

## (undated) DEVICE — NEEDLE, HYPODERMIC, 25 G X 2 IN

## (undated) DEVICE — SLEEVE, VASO PRESS, CALF GARMENT, MEDIUM, GREEN

## (undated) DEVICE — ELECTRODE, MULTIFUNCTION, QUICK-COMBO, EDGE SYSTEM, 2 FT

## (undated) DEVICE — WOUND SYSTEM, DEBRIDEMENT & CLEANING, O.R DUOPAK

## (undated) DEVICE — DRESSING, TELFA, 3X4

## (undated) DEVICE — BASIN, EMESIS, STANDARD, STERILE

## (undated) DEVICE — TR BAND, RADIAL COMPRESSION, STANDARD, 24CM

## (undated) DEVICE — CATHETER, OPTITORQUE, 6FR 110CM, TIGER RADIAL 4.0

## (undated) DEVICE — DRAPE COVER, C ARM, FLOUROSCAN IMAGING SYS

## (undated) DEVICE — SHEATH, GLIDESHEATH, SLENDER, 6FR 10CM

## (undated) DEVICE — SYRINGE, 7 CC, LUER SLIP, PULSATOR, LOSS OF RESISTANCE

## (undated) DEVICE — DRESSING, TRANSPARENT, TEGADERM, 4 X 4-3/4 IN, NO LABEL